# Patient Record
Sex: FEMALE | Race: WHITE | NOT HISPANIC OR LATINO | Employment: UNEMPLOYED | URBAN - METROPOLITAN AREA
[De-identification: names, ages, dates, MRNs, and addresses within clinical notes are randomized per-mention and may not be internally consistent; named-entity substitution may affect disease eponyms.]

---

## 2017-01-10 ENCOUNTER — ALLSCRIPTS OFFICE VISIT (OUTPATIENT)
Dept: OTHER | Facility: OTHER | Age: 22
End: 2017-01-10

## 2017-10-09 ENCOUNTER — OFFICE VISIT (OUTPATIENT)
Dept: URGENT CARE | Facility: MEDICAL CENTER | Age: 22
End: 2017-10-09
Payer: COMMERCIAL

## 2017-10-09 PROCEDURE — 99283 EMERGENCY DEPT VISIT LOW MDM: CPT

## 2017-10-09 PROCEDURE — G0382 LEV 3 HOSP TYPE B ED VISIT: HCPCS

## 2017-10-10 NOTE — PROGRESS NOTES
Assessment  1  Acute URI (465 9) (J06 9)    Plan  Acute URI    · Benzonatate 100 MG Oral Capsule; TAKE 1 CAPSULE 3 TIMES DAILY AS NEEDED    Discussion/Summary  Discussion Summary:   I prescribed Tessalon Perles 100 milligram q 8 hours  I encouraged patient to increase fluid intake  Also recommended over-the-counter Tylenol as needed for sore throat  If symptoms persist or worsen in next 2-3 days, patient should follow-up per primary care provider  Medication Side Effects Reviewed: Possible side effects of new medications were reviewed with the patient/guardian today  Counseling Documentation With Imm: The patient was counseled regarding  Chief Complaint  1  Cough  Chief Complaint Free Text Note Form: Patient stating of a dry cough and a itchy throat for 2 weeks  Denies fever  Has not tried any over the counter medicines  History of Present Illness  HPI: 23year old female presents to [facility] with throat pain and cough for 2 weeks  Pt  describes the throat pain as an itching sensation that is aggrevated by dry air  Pt  throat pain is worsened by swallowing  She has used cough drops with minor relief  Pt  describes her cough as dry  Pt  has been eating and drinking well  Pt  denies any fever, chills, SOB, and post-nasal drip  Hospital Based Practices Required Assessment:   Pain Assessment   the patient states they do not have pain  Abuse And Domestic Violence Screen   Domestic violence screen not done today  Reason DV Screen not done: not alone    Depression And Suicide Screen  Suicide screen not done today  -Reason suicide screen not done: not alone  Prefered Language is  [place]  Primary Language is  English  Review of Systems  Focused-Female:   Constitutional: no fever-and-no chills  ENT: sore throat  Cardiovascular: no complaints of slow or fast heart rate, no chest pain, no palpitations, no leg claudication or lower extremity edema  Respiratory: cough  Gastrointestinal: no complaints of abdominal pain, no constipation, no nausea or diarrhea, no vomiting, no bloody stools  Active Problems  1  Bilateral impacted cerumen (380 4) (H61 23)   2  Encounter for hearing examination (V72 19) (Z01 10)   3  Encounter for vision screening (V72 0) (Z01 00)   4  Morbid or severe obesity due to excess calories (278 01) (E66 01)   5  Need for influenza vaccination (V04 81) (Z23)   6  Right otitis externa (380 10) (H60 91)    Past Medical History  Active Problems And Past Medical History Reviewed: The active problems and past medical history were reviewed and updated today  Family History  Mother    1  Family history of hypertension (V17 49) (Z82 49)  Grandparent    2  Family history of diabetes mellitus (V18 0) (Z83 3)   3  Family history of hypertension (V17 49) (Z82 49)  Grandmother    4  Family history of malignant neoplasm (V16 9) (Z80 9)    Social History   · Denies alcohol consumption (V49 89) (Z78 9)   · Never A Smoker   · No drug use   · No preference on Roman Catholic beliefs   · Single    Current Meds   1  No Reported Medications Recorded  Medication List Reviewed: The medication list was reviewed and updated today  Allergies  1  No Known Drug Allergies    Vitals  Signs   Recorded: 02MXY8020 02:40PM   Temperature: 97 9 F, Tympanic  Heart Rate: 79  Respiration: 16  Systolic: 944  Diastolic: 72  Height: 5 ft 5 in  Weight: 237 lb   BMI Calculated: 39 44  BSA Calculated: 2 13  O2 Saturation: 98  Pain Scale: 0    Physical Exam    Constitutional   General appearance: No acute distress, well appearing and well nourished  Ears, Nose, Mouth, and Throat   External inspection of ears and nose: Normal     Otoscopic examination: Tympanic membranes translucent with normal light reflex  Canals patent without erythema  Nasal mucosa, septum, and turbinates: Normal without edema or erythema  Oropharynx: Abnormal  -Erythema of uvula     Pulmonary   Respiratory effort: No increased work of breathing or signs of respiratory distress  Cardiovascular   Auscultation of heart: Normal rate and rhythm, normal S1 and S2, without murmurs  Abdomen   Abdomen: Non-tender, no masses  Lymphatic   Palpation of lymph nodes in neck: No lymphadenopathy         Signatures   Electronically signed by : EPI Chi ; Oct  9 2017  3:17PM EST                       (Author)

## 2017-11-21 ENCOUNTER — GENERIC CONVERSION - ENCOUNTER (OUTPATIENT)
Dept: OTHER | Facility: OTHER | Age: 22
End: 2017-11-21

## 2017-11-23 ENCOUNTER — APPOINTMENT (EMERGENCY)
Dept: RADIOLOGY | Facility: HOSPITAL | Age: 22
End: 2017-11-23
Payer: COMMERCIAL

## 2017-11-23 ENCOUNTER — HOSPITAL ENCOUNTER (EMERGENCY)
Facility: HOSPITAL | Age: 22
Discharge: HOME | End: 2017-11-23
Attending: EMERGENCY MEDICINE
Payer: COMMERCIAL

## 2017-11-23 VITALS
DIASTOLIC BLOOD PRESSURE: 84 MMHG | OXYGEN SATURATION: 98 % | HEART RATE: 106 BPM | SYSTOLIC BLOOD PRESSURE: 141 MMHG | RESPIRATION RATE: 20 BRPM | TEMPERATURE: 99.3 F | WEIGHT: 235 LBS

## 2017-11-23 DIAGNOSIS — J18.9 PNEUMONIA: Primary | ICD-10-CM

## 2017-11-23 PROCEDURE — 99283 EMERGENCY DEPT VISIT LOW MDM: CPT

## 2017-11-23 PROCEDURE — 71020 HB CHEST X-RAY 2VW FRONTAL&LATL: CPT

## 2017-11-23 RX ORDER — AZITHROMYCIN 250 MG/1
TABLET, FILM COATED ORAL
Qty: 6 TABLET | Refills: 0 | Status: SHIPPED | OUTPATIENT
Start: 2017-11-23 | End: 2018-06-04 | Stop reason: HOSPADM

## 2017-11-23 RX ORDER — AZITHROMYCIN 250 MG/1
500 TABLET, FILM COATED ORAL ONCE
Status: COMPLETED | OUTPATIENT
Start: 2017-11-23 | End: 2017-11-23

## 2017-11-23 RX ADMIN — AZITHROMYCIN 500 MG: 250 TABLET, FILM COATED ORAL at 22:19

## 2017-11-24 NOTE — ED PROVIDER NOTES
History  Chief Complaint   Patient presents with    Cough     cough and sore throat for two weeks  "getting worse " denies tylenol or motrin  fever at home  29-year-old healthy female who presents today complaining of cough x1 week  Cough is dry  Patient also states that she has sore throat due to coughing  Had subjective a few days ago but did not check her temperature  No complaints of rhinorrhea, nasal congestion, ear pain, chest pain, shortness of breath, abdominal pain, vomiting, diarrhea, rashes  No sick contacts or recent travel  She is not a smoker  No history of asthma  She has been taking DayQuil with out relief of symptoms  No other attempted alleviating factors  None       History reviewed  No pertinent past medical history  History reviewed  No pertinent surgical history  History reviewed  No pertinent family history  I have reviewed and agree with the history as documented  Social History   Substance Use Topics    Smoking status: Never Smoker    Smokeless tobacco: Never Used    Alcohol use No        Review of Systems   Constitutional: Positive for fever (subjective)  Negative for activity change, appetite change and chills  HENT: Positive for sore throat  Negative for congestion, ear discharge, ear pain, facial swelling, rhinorrhea and trouble swallowing  Respiratory: Positive for cough  Negative for choking, chest tightness, shortness of breath and wheezing  Cardiovascular: Negative for chest pain and palpitations  Gastrointestinal: Negative for abdominal pain, diarrhea, nausea and vomiting  Genitourinary: Negative for dysuria, frequency, hematuria and urgency  Musculoskeletal: Negative for back pain  Skin: Negative for rash  Neurological: Negative for dizziness, weakness, light-headedness, numbness and headaches         Physical Exam  ED Triage Vitals [11/23/17 2120]   Temperature Pulse Respirations Blood Pressure SpO2   99 3 °F (37 4 °C) (!) 120 20 141/84 98 %      Temp src Heart Rate Source Patient Position - Orthostatic VS BP Location FiO2 (%)   -- Monitor -- Right arm --      Pain Score       3           Orthostatic Vital Signs  Vitals:    11/23/17 2120 11/23/17 2225   BP: 141/84    Pulse: (!) 120 (!) 106       Physical Exam   Constitutional: She is oriented to person, place, and time  She appears well-developed and well-nourished  Non-toxic appearance  She does not have a sickly appearance  She does not appear ill  No distress  Well appearing female, loud, coarse cough noted  HENT:   Head: Normocephalic and atraumatic  Right Ear: Hearing, tympanic membrane, external ear and ear canal normal    Left Ear: Hearing, tympanic membrane, external ear and ear canal normal    Nose: Nose normal  No mucosal edema or rhinorrhea  Mouth/Throat: Uvula is midline and mucous membranes are normal  Posterior oropharyngeal erythema present  No oropharyngeal exudate, posterior oropharyngeal edema or tonsillar abscesses  No tonsillar exudate  Eyes: Conjunctivae, EOM and lids are normal  Pupils are equal, round, and reactive to light  Neck: Normal range of motion  Neck supple  Normal range of motion present  Cardiovascular: Normal rate, regular rhythm and normal heart sounds  Exam reveals no gallop and no friction rub  No murmur heard  Pulmonary/Chest: Effort normal  No accessory muscle usage  No tachypnea  No respiratory distress  She has no decreased breath sounds  She has no wheezes  She has rhonchi (LLL)  She has no rales  Musculoskeletal: Normal range of motion  Neurological: She is alert and oriented to person, place, and time  Skin: Skin is warm and dry  Capillary refill takes less than 2 seconds  She is not diaphoretic  Psychiatric: She has a normal mood and affect  Nursing note and vitals reviewed        ED Medications  Medications   azithromycin (ZITHROMAX) tablet 500 mg (500 mg Oral Given 11/23/17 2219)       Diagnostic Studies  Results Reviewed     None                 XR chest 2 views   ED Interpretation by Jn Waters PA-C (11/23 2213)   Left lower lobe infiltrate                 Procedures  Procedures       Phone Contacts  ED Phone Contact    ED Course  ED Course                                MDM  Number of Diagnoses or Management Options  Pneumonia: new and requires workup  Diagnosis management comments:   22 yo F presents with 1 week of coughing, sore throat and subjective fevers  CXR with questionable early infiltrate in LLL  Will tx with azithromycin, supportive care: rest, fluids  F/u with PCP, RTED for worsening  Patient verbalizes understanding and agrees with plan  Amount and/or Complexity of Data Reviewed  Tests in the radiology section of CPT®: ordered and reviewed  Independent visualization of images, tracings, or specimens: yes    Patient Progress  Patient progress: stable    CritCare Time    Disposition  Final diagnoses:   Pneumonia     Time reflects when diagnosis was documented in both MDM as applicable and the Disposition within this note     Time User Action Codes Description Comment    11/23/2017 10:16 PM Wilda Delgado Add [J18 9] Pneumonia       ED Disposition     ED Disposition Condition Comment    Discharge  [address] [place] [address] discharge to home/self care      Condition at discharge: Good        Follow-up Information     Follow up With Specialties Details Why Contact Info Additional Information    Sheila Davey MD Family Medicine Schedule an appointment as soon as possible for a visit  [address] [place] [address] Drive       [address] [place] [address] Emergency Department Emergency Medicine  If symptoms worsen - [facility] [facility]  [phone] [place] ED, [address] [place] [address], [facility]        Discharge Medication List as of 11/23/2017 10:16 PM      START taking these medications    Details   azithromycin (ZITHROMAX) 250 mg tablet Take 2 tablets today then 1 tablet daily x 4 days, Print           No discharge procedures on file      ED Provider  Electronically Signed by           Alcira Dwyer PA-C  11/23/17 4685

## 2017-11-24 NOTE — DISCHARGE INSTRUCTIONS
Community Acquired Pneumonia   WHAT YOU NEED TO KNOW:   Community-acquired pneumonia (CAP) is a lung infection that you get outside of a hospital or nursing home setting  Your lungs become inflamed and cannot work well  CAP may be caused by bacteria, viruses, or fungi  DISCHARGE INSTRUCTIONS:   Return to the emergency department if:   · You are confused and cannot think clearly  · You have increased trouble breathing  · Your lips or fingernails turn gray or blue  Contact your healthcare provider if:   · Your symptoms do not get better, or they get worse  · You are urinating less, or not at all  · You have questions or concerns about your condition or care  Medicines:   · Medicines  may be given to treat a bacterial, viral, or fungal infection  You may also be given medicines to dilate your bronchial tubes to help you breathe more easily  · Take your medicine as directed  Contact your healthcare provider if you think your medicine is not helping or if you have side effects  Tell him or her if you are allergic to any medicine  Keep a list of the medicines, vitamins, and herbs you take  Include the amounts, and when and why you take them  Bring the list or the pill bottles to follow-up visits  Carry your medicine list with you in case of an emergency  Follow up with your healthcare provider within 3 days or as directed: You may need another x-ray  Write down your questions so you remember to ask them during your visits  Deep breathing and coughing:  Deep breathing helps open the air passages in your lungs  Coughing helps bring up mucus from your lungs  Take a deep breath and hold the breath as long as you can  Then push the air out of your lungs with a deep, strong cough  Spit out any mucus you have coughed up  Take 10 deep breaths in a row every hour that you are awake  Remember to follow each deep breath with a cough     Do not smoke or allow others to smoke around you:  Nicotine and other chemicals in cigarettes and cigars can cause lung damage  Ask your healthcare provider for information if you currently smoke and need help to quit  E-cigarettes or smokeless tobacco still contain nicotine  Talk to your healthcare provider before you use these products  Manage CAP at home:   · Breathe warm, moist air  This helps loosen mucus  Loosely place a warm, wet washcloth over your nose and mouth  A room humidifier may also help make the air moist     · Drink liquids as directed  Ask your healthcare provider how much liquid to drink each day and which liquids to drink  Liquids help make mucus thin and easier to get out of your body  · Gently tap your chest   This helps loosen mucus so it is easier to cough  Lie with your head lower than your chest several times a day and tap your chest      · Get plenty of rest   Rest helps your body heal   Prevent CAP:   · Wash your hands often with soap and water  Carry germ-killing hand gel with you  You can use the gel to clean your hands when soap and water are not available  Do not touch your eyes, nose, or mouth unless you have washed your hands first      · Clean surfaces often  Clean doorknobs, countertops, cell phones, and other surfaces that are touched often  · Always cover your mouth when you cough  Cough into a tissue or your shirtsleeve so you do not spread germs from your hands  · Try to avoid people who have a cold or the flu  If you are sick, stay away from others as much as possible  · Ask about vaccines  You may need a vaccine to help prevent pneumonia  Get an influenza (flu) vaccine every year as soon as it becomes available  © 2017 [employer] Information is for End User's use only and may not be sold, redistributed or otherwise used for commercial purposes  All illustrations and images included in CareNotes® are the copyrighted property of [employer]  or Carlos Kowalski    The above information is an  only  It is not intended as medical advice for individual conditions or treatments  Talk to your doctor, nurse or pharmacist before following any medical regimen to see if it is safe and effective for you

## 2017-12-05 ENCOUNTER — GENERIC CONVERSION - ENCOUNTER (OUTPATIENT)
Dept: OTHER | Facility: OTHER | Age: 22
End: 2017-12-05

## 2017-12-05 DIAGNOSIS — J18.1 LOBAR PNEUMONIA (HCC): ICD-10-CM

## 2018-01-13 NOTE — PROGRESS NOTES
Assessment    1  Encounter for preventive health examination (V70 0) (Z00 00)   2  Morbid or severe obesity due to excess calories (278 01) (E66 01)   3  Bilateral impacted cerumen (380 4) (H61 23)   4  Need for influenza vaccination (V04 81) (Z23)   5  Encounter for vision screening (V72 0) (Z01 00)   6  Encounter for hearing examination (V72 19) (Z01 10)    Plan  Bilateral impacted cerumen    · Debrox 6 5 % Otic Solution; Instill 5-10 drops twice daily for up to 4 days  starting in  1 week  Health Maintenance    · Use a sun block product with an SPF of 15 or more ; Status:Complete;   Done:  [id number]  Morbid or severe obesity due to excess calories    · (1) CBC/PLT/DIFF; Status:Active; Requested VDT:[id number];    · (1) COMPREHENSIVE METABOLIC PANEL; Status:Active; Requested WSI:[id number];    · (1) LIPID PANEL, FASTING; Status:Active; Requested RUBEN:[id number];    · Begin or continue regular aerobic exercise  Gradually work up to at least count1  sessions of dur1 of exercise a week ; Status:Complete;   Done: 05COD1780   · We recommend that you bring your body mass index down to 26 ; Status:Complete;    Done: 03Ttq1412  Need for influenza vaccination    · Fluarix Intramuscular Suspension    Discussion/Summary  health maintenance visit cervical cancer screening is not indicated Breast cancer screening: breast cancer screening is not indicated  Colorectal cancer screening: colorectal cancer screening is not indicated  Screening lab work includes hemoglobin, glucose and lipid profile  The immunizations will be given as outlined in the orders  She was advised to be evaluated by a dentist  Advice and education were given regarding nutrition and aerobic exercise  Patient discussion: discussed with the patient  Use debrox and follow up here in 2 weeks for ear irrigation  I discussed with her regular exercise is needed  Possible side effects of new medications were reviewed with the patient/guardian today     The patient was counseled regarding instructions for management, risk factor reductions, impressions  Self Referrals: No      Chief Complaint  Pt  is here for physical exam  Pt  states she was cleaning her ear on Saturday with an ear swab  Pt  states she made her self bleed  Pt  denies any other health concerns  History of Present Illness  HM, Adult Female: The patient is being seen for a health maintenance evaluation  The last health maintenance visit was 3 year(s) ago  General Health: The patient's health since the last visit is described as good  She does not have regular dental visits  She denies vision problems  She denies hearing loss  Immunizations status: up to date  Lifestyle:  She does not have a healthy diet  She does not exercise regularly  She does not use tobacco  She denies alcohol use  She denies drug use  Reproductive health:  she reports abnormal menses  she uses no contraception  Screening: cancer screening reviewed and current  metabolic screening reviewed and current  risk screening reviewed and current  HPI: 24 y/o F here for NP visit  She wants to get her drivers permit  She notes she was using qtip recently and had blood in left ear  No pain and no hearing difficulty      Review of Systems    Constitutional: No fever, no chills, feels well, no tiredness, no recent weight gain or weight loss  Eyes: No complaints of eye pain, no red eyes, no eyesight problems, no discharge, no dry eyes, no itching of eyes  ENT: no complaints of earache, no loss of hearing, no nose bleeds, no nasal discharge, no sore throat, no hoarseness  Cardiovascular: No complaints of slow heart rate, no fast heart rate, no chest pain, no palpitations, no leg claudication, no lower extremity edema  Respiratory: No complaints of shortness of breath, no wheezing, no cough, no SOB on exertion, no orthopnea, no PND     Gastrointestinal: No complaints of abdominal pain, no constipation, no nausea or vomiting, no diarrhea, no bloody stools  Genitourinary: No complaints of dysuria, no incontinence, no pelvic pain, no dysmenorrhea, no vaginal discharge or bleeding  Musculoskeletal: No complaints of arthralgias, no myalgias, no joint swelling or stiffness, no limb pain or swelling  Integumentary: No complaints of skin rash or lesions, no itching, no skin wounds, no breast pain or lump  Neurological: No complaints of headache, no confusion, no convulsions, no numbness, no dizziness or fainting, no tingling, no limb weakness, no difficulty walking  Psychiatric: Not suicidal, no sleep disturbance, no anxiety or depression, no change in personality, no emotional problems  Endocrine: No complaints of proptosis, no hot flashes, no muscle weakness, no deepening of the voice, no feelings of weakness  Hematologic/Lymphatic: No complaints of swollen glands, no swollen glands in the neck, does not bleed easily, does not bruise easily  Active Problems    1  Morbid or severe obesity due to excess calories (278 01) (E66 01)    Family History  Mother    · Family history of hypertension (V17 49) (Z82 49)  Grandparent    · Family history of diabetes mellitus (V18 0) (Z83 3)   · Family history of hypertension (V17 49) (Z82 49)  Grandmother    · Family history of malignant neoplasm (V16 9) (Z80 9)    Social History    · Denies alcohol consumption (V49 89) (Z78 9)   · Never A Smoker   · No drug use   · No preference on Zoroastrian beliefs   · Single    Current Meds   1  No Reported Medications Recorded    Allergies    1   No Known Drug Allergies    Vitals   Recorded: 10NZZ7747 33:12ZN   Systolic 753   Diastolic 88   Heart Rate 93   Respiration 19   Temperature 98 4 F   O2 Saturation 99   Height 5 ft 5 in   Weight 225 lb    BMI Calculated 37 44   BSA Calculated 2 08   BMI Percentile 99 %   2-20 Stature Percentile 61 %   2-20 Weight Percentile 99 %     Physical Exam    Constitutional   General appearance: No acute distress, well appearing and well nourished  Eyes   Conjunctiva and lids: No swelling, erythema or discharge  Pupils and irises: Equal, round and reactive to light  Ears, Nose, Mouth, and Throat   External inspection of ears and nose: Normal     Otoscopic examination: Abnormal   The right external canal had a cerumen impaction  The left external canal had a cerumen impaction  Oropharynx: Normal with no erythema, edema, exudate or lesions  Pulmonary   Respiratory effort: No increased work of breathing or signs of respiratory distress  Auscultation of lungs: Clear to auscultation  Cardiovascular   Palpation of heart: Normal PMI, no thrills  Auscultation of heart: Normal rate and rhythm, normal S1 and S2, without murmurs  Examination of extremities for edema and/or varicosities: Normal     Abdomen   Abdomen: Non-tender, no masses  Liver and spleen: No hepatomegaly or splenomegaly  Lymphatic   Palpation of lymph nodes in neck: No lymphadenopathy  Musculoskeletal   Gait and station: Normal     Digits and nails: Normal without clubbing or cyanosis  Inspection/palpation of joints, bones, and muscles: Normal     Skin   Skin and subcutaneous tissue: Normal without rashes or lesions  Neurologic   Cranial nerves: Cranial nerves 2-12 intact  Reflexes: 2+ and symmetric  Sensation: No sensory loss  Psychiatric   Orientation to person, place, and time: Normal     Mood and affect: Normal        Results/Data  PHQ-2 Adult Depression Screening 25Oct2016 10:30AM User, s     Test Name Result Flag Reference   PHQ-2 Adult Depression Score 0     Over the last two weeks, how often have you been bothered by any of the following problems? Little interest or pleasure in doing things: Not at all - 0  Feeling down, depressed, or hopeless: Not at all - 0   PHQ-2 Adult Depression Screening Negative         Procedure    Procedure: Hearing Acuity Test    Indication: Routine screeing     Audiometry: Hearing in the right ear: 20 decibals at 500 hertz, 20 decibals at 1000 hertz, 20 decibals at 2000 hertz, 20 decibals at 4000 hertz and 20 decibals at 6000 hertz  Hearing in the left ear: 20 decibals at 500 hertz, 20 decibals at 1000 hertz, 20 decibals at 2000 hertz, 20 decibals at 4000 hertz and 20 decibals at 6000 hertz  Procedure: Visual Acuity Test    Indication: routine screening  Inforrmation supplied by if a Snellen chart  Results: 20/15 in both eyes with corrective device, 20/15 in the right eye with corrective device, 20/15 in the left eye with corrective device   Color vision was reported by IF, screened with the [place] Test and the results were normal    The patient was cooperative, but tolerated the procedure well        Signatures   Electronically signed by : SHAWN Henson; Oct 26 2016  6:50AM EST                       (Author)    Electronically signed by : EPI Elizondo ; Oct 26 2016  1:41PM EST

## 2018-01-14 VITALS
WEIGHT: 228.38 LBS | RESPIRATION RATE: 16 BRPM | OXYGEN SATURATION: 97 % | TEMPERATURE: 97.9 F | HEART RATE: 139 BPM | SYSTOLIC BLOOD PRESSURE: 118 MMHG | DIASTOLIC BLOOD PRESSURE: 80 MMHG | BODY MASS INDEX: 38.05 KG/M2 | HEIGHT: 65 IN

## 2018-01-22 VITALS
SYSTOLIC BLOOD PRESSURE: 128 MMHG | WEIGHT: 237 LBS | HEIGHT: 65 IN | DIASTOLIC BLOOD PRESSURE: 84 MMHG | BODY MASS INDEX: 39.49 KG/M2

## 2018-01-24 VITALS
WEIGHT: 236.13 LBS | HEIGHT: 65 IN | DIASTOLIC BLOOD PRESSURE: 72 MMHG | SYSTOLIC BLOOD PRESSURE: 122 MMHG | HEART RATE: 80 BPM | OXYGEN SATURATION: 97 % | TEMPERATURE: 97.6 F | RESPIRATION RATE: 18 BRPM | BODY MASS INDEX: 39.34 KG/M2

## 2018-06-04 ENCOUNTER — OFFICE VISIT (OUTPATIENT)
Dept: URGENT CARE | Facility: MEDICAL CENTER | Age: 23
End: 2018-06-04
Payer: COMMERCIAL

## 2018-06-04 VITALS
RESPIRATION RATE: 18 BRPM | HEIGHT: 65 IN | HEART RATE: 82 BPM | TEMPERATURE: 98.4 F | WEIGHT: 243 LBS | OXYGEN SATURATION: 96 % | DIASTOLIC BLOOD PRESSURE: 75 MMHG | SYSTOLIC BLOOD PRESSURE: 121 MMHG | BODY MASS INDEX: 40.48 KG/M2

## 2018-06-04 DIAGNOSIS — H10.31 ACUTE CONJUNCTIVITIS OF RIGHT EYE, UNSPECIFIED ACUTE CONJUNCTIVITIS TYPE: Primary | ICD-10-CM

## 2018-06-04 PROCEDURE — G0382 LEV 3 HOSP TYPE B ED VISIT: HCPCS | Performed by: PHYSICIAN ASSISTANT

## 2018-06-04 PROCEDURE — 99283 EMERGENCY DEPT VISIT LOW MDM: CPT | Performed by: PHYSICIAN ASSISTANT

## 2018-06-04 RX ORDER — TOBRAMYCIN 3 MG/ML
1 SOLUTION/ DROPS OPHTHALMIC
Qty: 5 ML | Refills: 0 | Status: SHIPPED | OUTPATIENT
Start: 2018-06-04 | End: 2018-06-09

## 2018-06-04 NOTE — PROGRESS NOTES
[facility]        NAME: Darin Williamson is a 23 y o  female  : 1995    MRN: [id number]  DATE: 2018  TIME: 1:15 PM    Assessment and Plan   Acute conjunctivitis of right eye, unspecified acute conjunctivitis type [H10 31]  1  Acute conjunctivitis of right eye, unspecified acute conjunctivitis type  tobramycin (TOBREX) 0 3 % SOLN         Patient Instructions     Complete course of prescribed eye drops to affected eye  Maintain good hand hygiene and avoid swimming pools  Apply warm compresses to affected eye  Avoid sharing napkins, towels, pillow cases, and linens  Avoid contact use during the infection  Follow up with PCP if symptoms persist and/or worsen  Go to opthalmology if any additional symptoms develop or if you are in any acute visual distress (i e blurred vision, loss of vision)  Chief Complaint     Chief Complaint   Patient presents with    Eye Pain     Patient relates started with right eye discharge since yesterday  + pain and itching  History of Present Illness   Darin Williamson presents to the clinic c/o      53-year-old female presents for evaluation of right-sided eye discharge, redness for the last 3 days  She states on Friday her right eye felt itchy, and yesterday started get puffy with discharge  She denies any foreign body sensation  she denies any pain with extraocular movements  She denies any visual deficits in her right eye  She does not work contact lenses  She denies any painful eye symptoms  Review of Systems   Review of Systems   Eyes: Positive for discharge and redness  Respiratory: Negative  Current Medications     No long-term prescriptions on file         Current Allergies     Allergies as of 2018    (No Known Allergies)            The following portions of the patient's history were reviewed and updated as appropriate: allergies, current medications, past family history, past medical history, past social history, past surgical history and problem list     Objective   /75   Pulse 82   Temp 98 4 °F (36 9 °C) (Tympanic)   Resp 18   Ht 5' 5" (1 651 m)   Wt 110 kg (243 lb)   SpO2 96%   BMI 40 44 kg/m²        Physical Exam     Physical Exam   Constitutional: She appears well-developed and well-nourished  No distress  Eyes: EOM are normal  Pupils are equal, round, and reactive to light  Right eye exhibits exudate  Right conjunctiva is injected  Visible exudate in the medial epicanthal fold, as well as at the base of the lower right eyelid on the palpebral conjunctiva  No pain with extraocular movements  No visible proptosis  Cardiovascular: Normal rate, regular rhythm and normal heart sounds  Exam reveals no gallop and no friction rub  No murmur heard  Pulmonary/Chest: Effort normal and breath sounds normal  No respiratory distress  She has no wheezes  She has no rales  Skin: She is not diaphoretic  Nursing note and vitals reviewed

## 2018-06-04 NOTE — PATIENT INSTRUCTIONS
Conjunctivitis   AMBULATORY CARE:   Conjunctivitis,  or pink eye, is inflammation of your conjunctiva  The conjunctiva is a thin tissue that covers the front of your eye and the back of your eyelids  The conjunctiva helps protect your eye and keep it moist  Conjunctivitis may be caused by bacteria, allergies, or a virus  If your conjunctivitis is caused by bacteria, it may get better on its own in about 7 days  Viral conjunctivitis can last up to 3 weeks  Common symptoms may include any of the following: You will usually have symptoms in both eyes if your conjunctivitis is caused by allergies  You may also have other allergic symptoms, such as a rash or runny nose  Symptoms will usually start in 1 eye if your conjunctivitis is caused by a virus or bacteria  · Redness in the whites of your eye    · Itching in your eye or around your eye    · Feeling like there is something in your eye    · Watery or thick, sticky discharge    · Crusty eyelids when you wake up in the morning    · Burning, stinging, or swelling in your eye    · Pain when you see bright light  Seek care immediately if:   · You have worsening eye pain  · The swelling in your eye gets worse, even after treatment  · Your vision suddenly becomes worse or you cannot see at all  Contact your healthcare provider if:   · You develop a fever and ear pain  · You have tiny bumps or spots of blood on your eye  · You have questions or concerns about your condition or care  Treatment  will depend on the cause of your conjunctivitis  You may need antibiotics or allergy medicine as a pill, eye drop, or eye ointment  Manage your symptoms:   · Apply a cool compress  Wet a washcloth with cold water and place it on your eye  This will help decrease itching and irritation  · Do not wear contact lenses  They can irritate your eye  Throw away the pair you are using and ask when you can wear them again   Use a new pair of lenses when your healthcare provider says it is okay  · Avoid irritants  Stay away from smoke filled areas  Shield your eyes from wind and sun  · Flush your eye  You may need to flush your eye with saline to help decrease your symptoms  Ask for more information on how to flush your eye  Medicines:  Treatment depends on what is causing your conjunctivitis  You may be given any of the following:  · Allergy medicine  helps decrease itchy, red, swollen eyes caused by allergies  It may be given as a pill, eye drops, or nasal spray  · Antibiotics  may be needed if your conjunctivitis is caused by bacteria  This medicine may be given as a pill, eye drops, or eye ointment  · Take your medicine as directed  Contact your healthcare provider if you think your medicine is not helping or if you have side effects  Tell him or her if you are allergic to any medicine  Keep a list of the medicines, vitamins, and herbs you take  Include the amounts, and when and why you take them  Bring the list or the pill bottles to follow-up visits  Carry your medicine list with you in case of an emergency  Prevent the spread of conjunctivitis:   · Wash your hands with soap and water often  Wash your hands before and after you touch your eyes  Also wash your hands before you prepare or eat food and after you use the bathroom or change a diaper  · Avoid allergens  Try to avoid the things that cause your allergies, such as pets, dust, or grass  · Avoid contact with others  Do not share towels or washcloths  Try to stay away from others as much as possible  Ask when you can return to work or school  · Throw away eye makeup  The bacteria that caused your conjunctivitis can stay in eye makeup  Throw away mascara and other eye makeup  © 2017 [employer]  Information is for End User's use only and may not be sold, redistributed or otherwise used for commercial purposes   All illustrations and images included in [facility] are the copyrighted property of [employer]  or Carlos Kowalski  The above information is an  only  It is not intended as medical advice for individual conditions or treatments  Talk to your doctor, nurse or pharmacist before following any medical regimen to see if it is safe and effective for you

## 2018-12-20 ENCOUNTER — OFFICE VISIT (OUTPATIENT)
Dept: FAMILY MEDICINE CLINIC | Facility: CLINIC | Age: 23
End: 2018-12-20
Payer: COMMERCIAL

## 2018-12-20 VITALS
RESPIRATION RATE: 18 BRPM | BODY MASS INDEX: 40.54 KG/M2 | SYSTOLIC BLOOD PRESSURE: 108 MMHG | DIASTOLIC BLOOD PRESSURE: 70 MMHG | OXYGEN SATURATION: 96 % | HEIGHT: 65 IN | HEART RATE: 74 BPM | WEIGHT: 243.31 LBS | TEMPERATURE: 95.8 F

## 2018-12-20 DIAGNOSIS — N63.0 BREAST LUMP IN UPPER INNER QUADRANT: Primary | ICD-10-CM

## 2018-12-20 DIAGNOSIS — Z23 NEED FOR INFLUENZA VACCINATION: ICD-10-CM

## 2018-12-20 PROCEDURE — 1036F TOBACCO NON-USER: CPT | Performed by: PHYSICIAN ASSISTANT

## 2018-12-20 PROCEDURE — 99213 OFFICE O/P EST LOW 20 MIN: CPT | Performed by: PHYSICIAN ASSISTANT

## 2018-12-20 PROCEDURE — 90686 IIV4 VACC NO PRSV 0.5 ML IM: CPT

## 2018-12-20 PROCEDURE — 3008F BODY MASS INDEX DOCD: CPT | Performed by: PHYSICIAN ASSISTANT

## 2018-12-20 PROCEDURE — 90471 IMMUNIZATION ADMIN: CPT

## 2018-12-20 NOTE — PROGRESS NOTES
Assessment/Plan:    No problem-specific Assessment & Plan notes found for this encounter  Problem List Items Addressed This Visit     None      Visit Diagnoses     Breast lump in upper inner quadrant    -  Primary  Saeley hormonal breast changes  Recommend to monitor breasts and return in lesion is constant or grows or unable to be moved easily    Need for influenza vaccination        Relevant Orders    SYRINGE/SINGLE-DOSE VIAL: influenza vaccine, [id number], quadrivalent, 0 5 mL, preservative-free (AFLURIA, FLUARIX, FLULAVAL, FLUZONE) (Completed)            Subjective:      Patient ID: Ross Marie is a 26 y o  female  HPI   25 y/o F here for left breast lump that she felt about 3 weeks ago  It is not painful  It comes and goes at times  She first noted it after her period  NO FH of breast cancer  No skin rashes  The following portions of the patient's history were reviewed and updated as appropriate:   She  has no past medical history on file  She There are no active problems to display for this patient  She  has no past surgical history on file  Her family history includes Cancer in her family; Diabetes in her family; Hypertension in her family and mother  She  reports that she has never smoked  She has never used smokeless tobacco  She reports that she does not drink alcohol or use drugs  No current outpatient prescriptions on file  No current facility-administered medications for this visit  No current outpatient prescriptions on file prior to visit  No current facility-administered medications on file prior to visit  She has No Known Allergies       Review of Systems   Constitutional: Negative for chills  Respiratory: Negative for cough  Cardiovascular: Negative for chest pain  Skin: Negative for rash  Psychiatric/Behavioral: Negative for dysphoric mood           Objective:      /70 (BP Location: Left arm, Patient Position: Sitting, Cuff Size: Large) Pulse 74   Temp (!) 95 8 °F (35 4 °C) (Tympanic)   Resp 18   Ht 5' 5" (1 651 m)   Wt 110 kg (243 lb 5 oz)   LMP 11/06/2018 (Exact Date)   SpO2 96%   BMI 40 49 kg/m²          Physical Exam   Constitutional: She is oriented to person, place, and time  She appears well-developed and well-nourished  Genitourinary:   Genitourinary Comments: Left breast-no mass palpable  Patient unable to find either  No adenopathy   Neurological: She is alert and oriented to person, place, and time  Skin: Skin is warm and dry  No rash noted  Psychiatric: She has a normal mood and affect  Her behavior is normal    Nursing note and vitals reviewed

## 2019-03-27 ENCOUNTER — OFFICE VISIT (OUTPATIENT)
Dept: OBGYN CLINIC | Facility: CLINIC | Age: 24
End: 2019-03-27

## 2019-03-27 VITALS
SYSTOLIC BLOOD PRESSURE: 134 MMHG | DIASTOLIC BLOOD PRESSURE: 83 MMHG | HEART RATE: 112 BPM | WEIGHT: 230 LBS | BODY MASS INDEX: 38.27 KG/M2

## 2019-03-27 DIAGNOSIS — Z30.41 ENCOUNTER FOR SURVEILLANCE OF CONTRACEPTIVE PILLS: ICD-10-CM

## 2019-03-27 DIAGNOSIS — Z30.09 ENCOUNTER FOR COUNSELING REGARDING CONTRACEPTION: Primary | ICD-10-CM

## 2019-03-27 PROCEDURE — 99213 OFFICE O/P EST LOW 20 MIN: CPT | Performed by: NURSE PRACTITIONER

## 2019-03-27 RX ORDER — NORGESTIMATE AND ETHINYL ESTRADIOL 0.25-0.035
1 KIT ORAL DAILY
Qty: 28 TABLET | Refills: 3 | Status: SHIPPED | OUTPATIENT
Start: 2019-03-27 | End: 2020-03-25

## 2019-03-27 NOTE — PROGRESS NOTES
Assessment/Plan:         Diagnoses and all orders for this visit:    Encounter for counseling regarding contraception    Encounter for surveillance of contraceptive pills  -     norgestimate-ethinyl estradiol (ORTHO-CYCLEN) 0 25-35 MG-MCG per tablet; Take 1 tablet by mouth daily        Plan  Restart birth control pills as directed  Birth Control Pills   WHAT YOU NEED TO KNOW:   Birth control pills are also called oral contraceptives, or the pill  It is medicine that helps prevent pregnancy  Birth control pills work by preventing ovulation  Ovulation is when the ovaries make and release an egg cell each month  If this egg gets fertilized by sperm, pregnancy occurs  Birth control pills may also help to prevent pregnancy by keeping sperm from fertilizing an egg  DISCHARGE INSTRUCTIONS:   Follow up with your healthcare provider as directed:  Write down your questions so you remember to ask them during your visits  Advantages of birth control pills:  When birth control pills are used correctly, the chances of getting pregnant are very low  Birth control pills may help decrease bleeding and pain during your monthly period  They may also help prevent cancer of the uterus and ovaries  Disadvantages of birth control pills: You may have sudden changes in your mood or feelings while you take birth control pills  You may have nausea and decreased sex drive  You may have an increased appetite and rapid weight gain  You may also have bleeding in between periods, less frequent periods, vaginal dryness, and breast pain  Birth control pills will not protect you from sexually transmitted infections  Rarely, some birth control pills can increase your risk for a blood clot  This may become life-threatening  If you want to get pregnant: If you are planning to have a baby, ask your healthcare provider when you may stop taking your birth control pills  It may take some time for you to start ovulating again   Ask your healthcare provider for more information about pregnancy after birth control pills  When to start taking birth control pills after you have a baby: If you are not breastfeeding, you may start taking birth control pills 3 weeks after you give birth  You may be able to take certain types of birth control pills if you are breastfeeding  These pills can be started from 6 weeks to 6 months after you give birth  Ask your healthcare provider for more information about when to start taking birth control pills after you give birth  Contact your healthcare provider if:   · You have forgotten to take a birth control pill  · You have mood changes, such as depression, since starting birth control pills  · You have nausea or you are vomiting  · You have severe abdominal pain  · You missed a period and have questions or concerns about being pregnant  · You still have bleeding 4 months after taking birth control pills correctly  · You have questions or concerns about your condition or care  Return to the emergency department if:   · Your arm or leg feels warm, tender, and painful  It may look swollen and red  · You feel lightheaded, short of breath, and have chest pain  · You cough up blood  · You have any of the following signs of a stroke:      ¨ Numbness or drooping on one side of your face     ¨ Weakness in an arm or leg    ¨ Confusion or difficulty speaking    ¨ Dizziness, a severe headache, or vision loss    · You have severe pain, numbness, or swelling in your arms or legs  © 2017 [employer] Information is for End User's use only and may not be sold, redistributed or otherwise used for commercial purposes  All illustrations and images included in CareNotes® are the copyrighted property of [employer]  or [employer] Kowalski  The above information is an  only  It is not intended as medical advice for individual conditions or treatments   Talk to your doctor, nurse or pharmacist before following any medical regimen to see if it is safe and effective for you  Missed or Late Pills: For combined (Estrogen and Progestin) pills:    If one hormonal pill is LATE (<24 hours since a pill should have been taken): Take the late or missed pill as soon as possible  Continue taking the remaining pills at the usual time (even if it means taking two pills on the same day)  No additional contraceptive protection is needed  Emergency contraception is not usually needed but can be considered if hormonal pills were missed earlier in the cycle or in the last week of the previous cycle  If one hormonal pill has been MISSED (24 to <48 hours since a pill should have been taken): Take the late or missed pill as soon as possible  Continue taking the remaining pills at the usual time (even if it means taking two pills on the same day)  No additional contraceptive protection is needed  Emergency contraception is not usually needed but can be considered if hormonal pills were missed earlier in the cycle or in the last week of the previous cycle  If two or more consecutive hormonal pills have been missed: (less than or equal to 48 hours since a pill should have been taken): Take the most recent missed pill as soon as possible  (Any other missed pills should be discarded ) Continue taking the remaining pills at the usual time (even if it means taking two pills on the same day)  Use back-up contraception (e g , condoms) or avoid sexual intercourse until hormonal pills have been taken for 7 consecutive days  If pills were missed in the last week of hormonal pills (e g , days 15-21 for 28-day pill packs): Omit the hormone-free interval by finishing the horomal pills in the current pack and starting a new pack the next day   If unable to start a new pack immediately, use back-up contraception (e g , condoms) or avoid sexual intercourse until hormonal pills from a new pack have been taken for 7 consecutive days  Emergency contraception should be considered if hormonal pills were missed during the first week and unprotected sexual intercourse occurred in the previous 5 days  Emergency contraception may also be considered at other times as appropriate  Source: [place]  Selected Practice Recommendations for Contraceptive Use, 2013  Call with needs or concerns  Return for Mirena insertion  Schedule annual GYN exam and PAP  Pt verbalized understanding of all discussed  Subjective:      Patient ID: Tj Hill is a 26 y o  female  HPI  Pt presents to restart OCP's, pt has been off OCP's for a short time  Pt has been using condoms for birth control  Safe and effective use of OCP's provided  Pt is considering a Mirena, states she wants a long term method, states 1 partner and they do not want a child for 5-10 years  Safe and effective use of Mirena provided, including irregular bleeding pattern  States she just finished LMP yesterday  The following portions of the patient's history were reviewed and updated as appropriate: allergies, current medications, past family history, past medical history, past social history, past surgical history and problem list     Review of Systems    Pertinent items are noted in the HPI  Objective:      /83 (BP Location: Left arm, Patient Position: Sitting, Cuff Size: Standard)   Pulse (!) 112   Wt 104 kg (230 lb)   LMP 03/20/2019 (Approximate)   Breastfeeding?  No   BMI 38 27 kg/m²          Physical Exam    Alert and oriented  Denies pain  Plans to schedule Mirena insertion  Plans to schedule annual GYN exam and PAP

## 2019-03-27 NOTE — PATIENT INSTRUCTIONS
Restart birth control pills as directed  Birth Control Pills   WHAT YOU NEED TO KNOW:   Birth control pills are also called oral contraceptives, or the pill  It is medicine that helps prevent pregnancy  Birth control pills work by preventing ovulation  Ovulation is when the ovaries make and release an egg cell each month  If this egg gets fertilized by sperm, pregnancy occurs  Birth control pills may also help to prevent pregnancy by keeping sperm from fertilizing an egg  DISCHARGE INSTRUCTIONS:   Follow up with your healthcare provider as directed:  Write down your questions so you remember to ask them during your visits  Advantages of birth control pills:  When birth control pills are used correctly, the chances of getting pregnant are very low  Birth control pills may help decrease bleeding and pain during your monthly period  They may also help prevent cancer of the uterus and ovaries  Disadvantages of birth control pills: You may have sudden changes in your mood or feelings while you take birth control pills  You may have nausea and decreased sex drive  You may have an increased appetite and rapid weight gain  You may also have bleeding in between periods, less frequent periods, vaginal dryness, and breast pain  Birth control pills will not protect you from sexually transmitted infections  Rarely, some birth control pills can increase your risk for a blood clot  This may become life-threatening  If you want to get pregnant: If you are planning to have a baby, ask your healthcare provider when you may stop taking your birth control pills  It may take some time for you to start ovulating again  Ask your healthcare provider for more information about pregnancy after birth control pills  When to start taking birth control pills after you have a baby: If you are not breastfeeding, you may start taking birth control pills 3 weeks after you give birth   You may be able to take certain types of birth control pills if you are breastfeeding  These pills can be started from 6 weeks to 6 months after you give birth  Ask your healthcare provider for more information about when to start taking birth control pills after you give birth  Contact your healthcare provider if:   · You have forgotten to take a birth control pill  · You have mood changes, such as depression, since starting birth control pills  · You have nausea or you are vomiting  · You have severe abdominal pain  · You missed a period and have questions or concerns about being pregnant  · You still have bleeding 4 months after taking birth control pills correctly  · You have questions or concerns about your condition or care  Return to the emergency department if:   · Your arm or leg feels warm, tender, and painful  It may look swollen and red  · You feel lightheaded, short of breath, and have chest pain  · You cough up blood  · You have any of the following signs of a stroke:      ¨ Numbness or drooping on one side of your face     ¨ Weakness in an arm or leg    ¨ Confusion or difficulty speaking    ¨ Dizziness, a severe headache, or vision loss    · You have severe pain, numbness, or swelling in your arms or legs  © 2017 [employer]  Information is for End User's use only and may not be sold, redistributed or otherwise used for commercial purposes  All illustrations and images included in CareNotes® are the copyrighted property of [employer]  or [employer] Kowalski  The above information is an  only  It is not intended as medical advice for individual conditions or treatments  Talk to your doctor, nurse or pharmacist before following any medical regimen to see if it is safe and effective for you  Missed or Late Pills: For combined (Estrogen and Progestin) pills:    If one hormonal pill is LATE (<24 hours since a pill should have been taken): Take the late or missed pill as soon as possible  Continue taking the remaining pills at the usual time (even if it means taking two pills on the same day)  No additional contraceptive protection is needed  Emergency contraception is not usually needed but can be considered if hormonal pills were missed earlier in the cycle or in the last week of the previous cycle  If one hormonal pill has been MISSED (24 to <48 hours since a pill should have been taken): Take the late or missed pill as soon as possible  Continue taking the remaining pills at the usual time (even if it means taking two pills on the same day)  No additional contraceptive protection is needed  Emergency contraception is not usually needed but can be considered if hormonal pills were missed earlier in the cycle or in the last week of the previous cycle  If two or more consecutive hormonal pills have been missed: (less than or equal to 48 hours since a pill should have been taken): Take the most recent missed pill as soon as possible  (Any other missed pills should be discarded ) Continue taking the remaining pills at the usual time (even if it means taking two pills on the same day)  Use back-up contraception (e g , condoms) or avoid sexual intercourse until hormonal pills have been taken for 7 consecutive days  If pills were missed in the last week of hormonal pills (e g , days 15-21 for 28-day pill packs): Omit the hormone-free interval by finishing the horomal pills in the current pack and starting a new pack the next day  If unable to start a new pack immediately, use back-up contraception (e g , condoms) or avoid sexual intercourse until hormonal pills from a new pack have been taken for 7 consecutive days  Emergency contraception should be considered if hormonal pills were missed during the first week and unprotected sexual intercourse occurred in the previous 5 days  Emergency contraception may also be considered at other times as appropriate       Source: [place]  Selected Practice Recommendations for Contraceptive Use, 2013      Call with needs or concerns  Return for Mirena insertion  Schedule annual GYN exam and PAP

## 2019-04-11 ENCOUNTER — PROCEDURE VISIT (OUTPATIENT)
Dept: OBGYN CLINIC | Facility: CLINIC | Age: 24
End: 2019-04-11

## 2019-04-11 VITALS
HEART RATE: 80 BPM | BODY MASS INDEX: 39.37 KG/M2 | SYSTOLIC BLOOD PRESSURE: 142 MMHG | WEIGHT: 236.6 LBS | DIASTOLIC BLOOD PRESSURE: 85 MMHG

## 2019-04-11 DIAGNOSIS — Z30.430 ENCOUNTER FOR INSERTION OF INTRAUTERINE CONTRACEPTIVE DEVICE (IUD): Primary | ICD-10-CM

## 2019-04-11 LAB — SL AMB POCT URINE HCG: NEGATIVE

## 2019-04-11 PROCEDURE — 99213 OFFICE O/P EST LOW 20 MIN: CPT | Performed by: OBSTETRICS & GYNECOLOGY

## 2019-04-11 PROCEDURE — 58300 INSERT INTRAUTERINE DEVICE: CPT | Performed by: OBSTETRICS & GYNECOLOGY

## 2019-04-11 PROCEDURE — 81025 URINE PREGNANCY TEST: CPT | Performed by: OBSTETRICS & GYNECOLOGY

## 2020-03-25 ENCOUNTER — OFFICE VISIT (OUTPATIENT)
Dept: URGENT CARE | Facility: MEDICAL CENTER | Age: 25
End: 2020-03-25
Payer: COMMERCIAL

## 2020-03-25 VITALS
OXYGEN SATURATION: 97 % | HEART RATE: 83 BPM | DIASTOLIC BLOOD PRESSURE: 66 MMHG | RESPIRATION RATE: 17 BRPM | SYSTOLIC BLOOD PRESSURE: 116 MMHG | BODY MASS INDEX: 39.15 KG/M2 | WEIGHT: 235 LBS | HEIGHT: 65 IN | TEMPERATURE: 98.1 F

## 2020-03-25 DIAGNOSIS — R19.8 UMBILICUS DISCHARGE: Primary | ICD-10-CM

## 2020-03-25 PROCEDURE — 87205 SMEAR GRAM STAIN: CPT | Performed by: PHYSICIAN ASSISTANT

## 2020-03-25 PROCEDURE — G0382 LEV 3 HOSP TYPE B ED VISIT: HCPCS | Performed by: PHYSICIAN ASSISTANT

## 2020-03-25 PROCEDURE — 99203 OFFICE O/P NEW LOW 30 MIN: CPT | Performed by: PHYSICIAN ASSISTANT

## 2020-03-25 PROCEDURE — 99283 EMERGENCY DEPT VISIT LOW MDM: CPT | Performed by: PHYSICIAN ASSISTANT

## 2020-03-25 PROCEDURE — 87070 CULTURE OTHR SPECIMN AEROBIC: CPT | Performed by: PHYSICIAN ASSISTANT

## 2020-03-25 RX ORDER — MUPIROCIN CALCIUM 20 MG/G
CREAM TOPICAL 2 TIMES DAILY
Qty: 15 G | Refills: 0 | Status: SHIPPED | OUTPATIENT
Start: 2020-03-25 | End: 2020-03-25 | Stop reason: SDUPTHER

## 2020-03-25 NOTE — PROGRESS NOTES
[facility]        NAME: Dinorah Peterson is a 27 y o  female  : 1995    MRN: [id number]  DATE: 2020  TIME: 2:20 PM    Assessment and Plan   Umbilicus discharge [L64 5]  1  Umbilicus discharge  Wound culture and Gram stain    mupirocin (BACTROBAN) 2 % ointment    DISCONTINUED: mupirocin (BACTROBAN) 2 % cream         Patient Instructions     Use mupirocin ointment twice a day until irritation resolves  Will send out culture  If culture comes back positive for infection, will send antibiotics at that time  Clean twice a day with over-the-counter Bactine, hydrogen peroxide, or alcohol swabs  Follow up with PCP if symptoms do not resolve  Go to the ER if symptoms become severe  Chief Complaint     Chief Complaint   Patient presents with    Wound Infection     possible belly button infection  clear discharge about a week ago  no pain at this time  History of Present Illness       Patient is a 27 y o female presenting for possible belly button infection  Pt states she noticed discharge with a foul smell about a week ago  Clear discharge  Pt states the area is erythematous and itchy  States there is some "crusting" around it  Pt denies injury to the area  Denies pain  Pt states she has never had anything like this in the past   Pt denies N/V/D, fevers, chills  Review of Systems   Review of Systems   Constitutional: Negative for activity change, appetite change, chills, fatigue and fever  Gastrointestinal: Negative for abdominal pain, nausea and vomiting  Musculoskeletal:        Clear discharge from belly button   Skin: Positive for color change and rash  Negative for pallor and wound           Current Medications       Current Outpatient Medications:     mupirocin (BACTROBAN) 2 % ointment, Apply topically 2 (two) times a day, Disp: 22 g, Rfl: 0    Current Allergies     Allergies as of 2020    (No Known Allergies)            The following portions of the patient's history were reviewed and updated as appropriate: allergies, current medications, past family history, past medical history, past social history, past surgical history and problem list      History reviewed  No pertinent past medical history  History reviewed  No pertinent surgical history  Family History   Problem Relation Age of Onset    Hypertension Mother     Diabetes Family     Hypertension Family     Cancer Family          Medications have been verified  Objective   /66   Pulse 83   Temp 98 1 °F (36 7 °C) (Tympanic)   Resp 17   Ht 5' 5" (1 651 m)   Wt 107 kg (235 lb)   SpO2 97%   BMI 39 11 kg/m²        Physical Exam     Physical Exam   Constitutional: She is oriented to person, place, and time  She appears well-developed and well-nourished  No distress  Abdominal: Soft  Bowel sounds are normal  She exhibits no distension  There is no tenderness  There is no rebound  Neurological: She is alert and oriented to person, place, and time  Skin: Skin is warm  No rash noted  She is not diaphoretic  There is erythema  No pallor  Mild ring of irritation on the inside of the belly button  Clear drainage noted inside belly button  Took culture  No pus, no tenderness to palpation  Foul odor coming from belly button  Cleaned with iodine, alcohol swabs, and normal saline    Psychiatric: She has a normal mood and affect  Her behavior is normal    Nursing note and vitals reviewed

## 2020-03-25 NOTE — PATIENT INSTRUCTIONS
Use mupirocin ointment twice a day until irritation resolves  Will send out culture  If culture comes back positive for infection, will send antibiotics at that time  Clean twice a day with over-the-counter Bactine, hydrogen peroxide, or alcohol swabs  Follow up with PCP if symptoms do not resolve  Go to the ER if symptoms become severe  Wound Infection   WHAT YOU NEED TO KNOW:   A wound infection occurs when bacteria enters a break in the skin  The infection may involve just the skin, or affect deeper tissues or organs close to the wound  DISCHARGE INSTRUCTIONS:   Return to the emergency department if:   · You feel short of breath  · Your heart is beating faster than usual      · You feel confused  · Blood soaks through your bandages  · Your wound comes apart or feels like it is ripping  · You have severe pain  · You see red streaks coming from the infected area  Contact your healthcare provider if:   · You have a fever or chills  · You have more pain, redness, or swelling near your wound  · Your symptoms do not improve  · The skin around your wound feels numb  · You have questions or concerns about your condition or care  Medicines: You may need any of the following:  · NSAIDs , such as ibuprofen, help decrease swelling, pain, and fever  This medicine is available with or without a doctor's order  NSAIDs can cause stomach bleeding or kidney problems in certain people  If you take blood thinner medicine, always ask your healthcare provider if NSAIDs are safe for you  Always read the medicine label and follow directions  · Antibiotics  help treat a bacterial infection  · Take your medicine as directed  Contact your healthcare provider if you think your medicine is not helping or if you have side effects  Tell him or her if you are allergic to any medicine  Keep a list of the medicines, vitamins, and herbs you take   Include the amounts, and when and why you take them  Bring the list or the pill bottles to follow-up visits  Carry your medicine list with you in case of an emergency  Care for your wound as directed:  Keep your wound clean and dry  You may need to cover your wound when you bathe so it does not get wet  Clean your wound as directed with soap and water or wound   Put on new, clean bandages as directed  Change your bandages when they get wet or dirty  Help your wound heal:   · Eat a variety of healthy foods  Examples include fruits, vegetables, whole-grain breads, low-fat dairy products, beans, lean meats, and fish  Healthy foods may help you heal faster  You may also need to take vitamins and minerals  Ask if you need to be on a special diet  · Manage other health conditions  Follow your healthcare provider's directions to manage health conditions that can cause slow wound healing  Examples include high blood pressure and diabetes  · Do not smoke  Nicotine and other chemicals in cigarettes and cigars can cause slow wound healing  Ask your healthcare provider for information if you currently smoke and need help to quit  E-cigarettes or smokeless tobacco still contain nicotine  Talk to your healthcare provider before you use these products  Follow up with your healthcare provider in 1 to 2 days:  Write down your questions so you remember to ask them during your visits  © 2017 [employer]  Information is for End User's use only and may not be sold, redistributed or otherwise used for commercial purposes  All illustrations and images included in CareNotes® are the copyrighted property of [employer]  or Carlos Kowalski  The above information is an  only  It is not intended as medical advice for individual conditions or treatments  Talk to your doctor, nurse or pharmacist before following any medical regimen to see if it is safe and effective for you

## 2020-03-27 LAB
BACTERIA WND AEROBE CULT: ABNORMAL
GRAM STN SPEC: ABNORMAL

## 2020-03-31 ENCOUNTER — TELEPHONE (OUTPATIENT)
Dept: URGENT CARE | Facility: MEDICAL CENTER | Age: 25
End: 2020-03-31

## 2020-03-31 NOTE — TELEPHONE ENCOUNTER
Tried to call Yong Cortes about her culture results and the number provided was the wrong number  If she calls back, will inform her of her culture results at that time

## 2020-08-31 ENCOUNTER — TELEPHONE (OUTPATIENT)
Dept: FAMILY MEDICINE CLINIC | Facility: CLINIC | Age: 25
End: 2020-08-31

## 2020-08-31 NOTE — TELEPHONE ENCOUNTER
----- [id number] sent at 8/24/2020 10:08 AM EDT -----  Regarding: well  Recommend yearly well visit

## 2021-08-11 ENCOUNTER — OFFICE VISIT (OUTPATIENT)
Dept: URGENT CARE | Age: 26
End: 2021-08-11
Payer: COMMERCIAL

## 2021-08-11 VITALS
DIASTOLIC BLOOD PRESSURE: 67 MMHG | RESPIRATION RATE: 17 BRPM | HEIGHT: 65 IN | SYSTOLIC BLOOD PRESSURE: 152 MMHG | WEIGHT: 271 LBS | HEART RATE: 87 BPM | TEMPERATURE: 98 F | OXYGEN SATURATION: 95 % | BODY MASS INDEX: 45.15 KG/M2

## 2021-08-11 DIAGNOSIS — S61.219A LACERATION WITHOUT FOREIGN BODY OF UNSPECIFIED FINGER WITHOUT DAMAGE TO NAIL, INITIAL ENCOUNTER: Primary | ICD-10-CM

## 2021-08-11 DIAGNOSIS — T14.90XD HEALING WOUND: ICD-10-CM

## 2021-08-11 PROCEDURE — 90715 TDAP VACCINE 7 YRS/> IM: CPT

## 2021-08-11 PROCEDURE — G0382 LEV 3 HOSP TYPE B ED VISIT: HCPCS | Performed by: NURSE PRACTITIONER

## 2021-08-11 PROCEDURE — 90471 IMMUNIZATION ADMIN: CPT | Performed by: NURSE PRACTITIONER

## 2021-08-11 NOTE — PROGRESS NOTES
NAME: Rajiv García is a 27 y o  female  : 1995    MRN: [id number]    /67 (BP Location: Left arm, Patient Position: Sitting, Cuff Size: Adult)   Pulse 87   Temp 98 °F (36 7 °C) (Tympanic)   Resp 17   Ht 5' 5" (1 651 m)   Wt 123 kg (271 lb)   SpO2 95%   BMI 45 10 kg/m²     Assessment and Plan   Laceration without foreign body of unspecified finger without damage to nail, initial encounter [S61 219A]  1  Laceration without foreign body of unspecified finger without damage to nail, initial encounter  TDAP VACCINE GREATER THAN OR EQUAL TO 8YO IM   2  Healing wound         Mercedes was seen today for hand pain  Diagnoses and all orders for this visit:    Laceration without foreign body of unspecified finger without damage to nail, initial encounter  -     TDAP VACCINE GREATER THAN OR EQUAL TO 8YO IM    Healing wound        Patient Instructions   Patient Instructions   Tetanus shot given today  Wound is healing well, continue to keep it clean, cover while working, use topical antibiotic ointment to the area 2 times a day  Denies filing a workers comp injury    Follow up with pcp       Proceed to the nearest ER if symptoms worsen, Follow up with your PCP  Continue to social distance, wash your hands, and wear your masks  Please continue to follow the CDC  gov guidelines daily for they are subject to change on COVID-19    Chief Complaint     Chief Complaint   Patient presents with    Hand Pain     yesterday at work, pt got a cut from a  across dorsal left hand  pt unsure of her last tetanus  History of Present Illness     28 yo female here today with a healing laceration to the right hand, dorsal side, no signs of infection, full ROM of the right hand  It happened while opening a box and cut self with   Occurred at work, denies filing a workers comp injury at this time  Pt came in for tetanus shot         Review of Systems   Review of Systems   Skin: Positive for wound (dorsal jose right hand)  Current Medications       Current Outpatient Medications:     mupirocin (BACTROBAN) 2 % ointment, Apply topically 2 (two) times a day (Patient not taking: Reported on 8/11/2021), Disp: 22 g, Rfl: 0    Current Allergies     Allergies as of 08/11/2021    (No Known Allergies)              History reviewed  No pertinent past medical history  History reviewed  No pertinent surgical history  Family History   Problem Relation Age of Onset    Hypertension Mother     Diabetes Family     Hypertension Family     Cancer Family          Medications have been verified  The following portions of the patient's history were reviewed and updated as appropriate: allergies, current medications, past family history, past medical history, past social history, past surgical history and problem list     Objective   /67 (BP Location: Left arm, Patient Position: Sitting, Cuff Size: Adult)   Pulse 87   Temp 98 °F (36 7 °C) (Tympanic)   Resp 17   Ht 5' 5" (1 651 m)   Wt 123 kg (271 lb)   SpO2 95%   BMI 45 10 kg/m²      Physical Exam     Physical Exam  Constitutional:       Appearance: Normal appearance  Musculoskeletal:      Right hand: Laceration (healing on dorsal side of right hand) present  No swelling, deformity or tenderness  Comments: No swelling, no drainage, no s/s of infection, full ROM, contine to use right hand   Neurological:      Mental Status: She is alert  Note: Portions of this record may have been created with voice recognition software  Occasional wrong word or "sound a like" substitutions may have occurred due to the inherent limitations of voice recognition software  Please read the chart carefully and recognize, using context, where substitutions have occurred  EVIN Balbuena

## 2021-08-11 NOTE — PATIENT INSTRUCTIONS
Tetanus shot given today  Wound is healing well, continue to keep it clean, cover while working, use topical antibiotic ointment to the area 2 times a day     Denies filing a workers comp injury    Follow up with pcp

## 2023-04-25 ENCOUNTER — APPOINTMENT (EMERGENCY)
Dept: CT IMAGING | Facility: HOSPITAL | Age: 28
End: 2023-04-25

## 2023-04-25 ENCOUNTER — HOSPITAL ENCOUNTER (OUTPATIENT)
Facility: HOSPITAL | Age: 28
Setting detail: OUTPATIENT SURGERY
Discharge: HOME HEALTH | End: 2023-04-27
Attending: EMERGENCY MEDICINE | Admitting: SURGERY

## 2023-04-25 ENCOUNTER — ANESTHESIA EVENT (OUTPATIENT)
Dept: PERIOP | Facility: HOSPITAL | Age: 28
End: 2023-04-25

## 2023-04-25 ENCOUNTER — ANESTHESIA (OUTPATIENT)
Dept: PERIOP | Facility: HOSPITAL | Age: 28
End: 2023-04-25

## 2023-04-25 DIAGNOSIS — R10.9 ABDOMINAL PAIN: Primary | ICD-10-CM

## 2023-04-25 DIAGNOSIS — L02.216 ABSCESS, UMBILICAL: ICD-10-CM

## 2023-04-25 DIAGNOSIS — K42.9 UMBILICAL HERNIA: ICD-10-CM

## 2023-04-25 DIAGNOSIS — L03.90 CELLULITIS: ICD-10-CM

## 2023-04-25 LAB
ANION GAP SERPL CALCULATED.3IONS-SCNC: 13 MMOL/L (ref 4–13)
BASOPHILS # BLD AUTO: 0.04 THOUSANDS/ÂΜL (ref 0–0.1)
BASOPHILS NFR BLD AUTO: 0 % (ref 0–1)
BUN SERPL-MCNC: 14 MG/DL (ref 5–25)
CALCIUM SERPL-MCNC: 9.3 MG/DL (ref 8.4–10.2)
CHLORIDE SERPL-SCNC: 102 MMOL/L (ref 96–108)
CO2 SERPL-SCNC: 23 MMOL/L (ref 21–32)
CREAT SERPL-MCNC: 0.7 MG/DL (ref 0.6–1.3)
EOSINOPHIL # BLD AUTO: 0.04 THOUSAND/ÂΜL (ref 0–0.61)
EOSINOPHIL NFR BLD AUTO: 0 % (ref 0–6)
ERYTHROCYTE [DISTWIDTH] IN BLOOD BY AUTOMATED COUNT: 12.6 % (ref 11.6–15.1)
EXT PREGNANCY TEST URINE: NEGATIVE
EXT. CONTROL: NORMAL
GFR SERPL CREATININE-BSD FRML MDRD: 120 ML/MIN/1.73SQ M
GLUCOSE SERPL-MCNC: 94 MG/DL (ref 65–140)
HCT VFR BLD AUTO: 44.4 % (ref 34.8–46.1)
HGB BLD-MCNC: 14.6 G/DL (ref 11.5–15.4)
IMM GRANULOCYTES # BLD AUTO: 0.06 THOUSAND/UL (ref 0–0.2)
IMM GRANULOCYTES NFR BLD AUTO: 1 % (ref 0–2)
LACTATE SERPL-SCNC: 0.8 MMOL/L (ref 0.5–2)
LYMPHOCYTES # BLD AUTO: 0.96 THOUSANDS/ÂΜL (ref 0.6–4.47)
LYMPHOCYTES NFR BLD AUTO: 8 % (ref 14–44)
MCH RBC QN AUTO: 27.9 PG (ref 26.8–34.3)
MCHC RBC AUTO-ENTMCNC: 32.9 G/DL (ref 31.4–37.4)
MCV RBC AUTO: 85 FL (ref 82–98)
MONOCYTES # BLD AUTO: 0.86 THOUSAND/ÂΜL (ref 0.17–1.22)
MONOCYTES NFR BLD AUTO: 7 % (ref 4–12)
NEUTROPHILS # BLD AUTO: 10.67 THOUSANDS/ÂΜL (ref 1.85–7.62)
NEUTS SEG NFR BLD AUTO: 84 % (ref 43–75)
NRBC BLD AUTO-RTO: 0 /100 WBCS
PLATELET # BLD AUTO: 231 THOUSANDS/UL (ref 149–390)
PMV BLD AUTO: 9.8 FL (ref 8.9–12.7)
POTASSIUM SERPL-SCNC: 3.2 MMOL/L (ref 3.5–5.3)
RBC # BLD AUTO: 5.23 MILLION/UL (ref 3.81–5.12)
SODIUM SERPL-SCNC: 138 MMOL/L (ref 135–147)
WBC # BLD AUTO: 12.63 THOUSAND/UL (ref 4.31–10.16)

## 2023-04-25 PROCEDURE — 96375 TX/PRO/DX INJ NEW DRUG ADDON: CPT

## 2023-04-25 PROCEDURE — 87070 CULTURE OTHR SPECIMN AEROBIC: CPT | Performed by: SURGERY

## 2023-04-25 PROCEDURE — 85025 COMPLETE CBC W/AUTO DIFF WBC: CPT

## 2023-04-25 PROCEDURE — 96365 THER/PROPH/DIAG IV INF INIT: CPT

## 2023-04-25 PROCEDURE — 96367 TX/PROPH/DG ADDL SEQ IV INF: CPT

## 2023-04-25 PROCEDURE — 87185 SC STD ENZYME DETCJ PER NZM: CPT | Performed by: SURGERY

## 2023-04-25 PROCEDURE — 49250 EXCISION OF UMBILICUS: CPT | Performed by: SURGERY

## 2023-04-25 PROCEDURE — 87075 CULTR BACTERIA EXCEPT BLOOD: CPT | Performed by: SURGERY

## 2023-04-25 PROCEDURE — 88342 IMHCHEM/IMCYTCHM 1ST ANTB: CPT | Performed by: SPECIALIST

## 2023-04-25 PROCEDURE — 36415 COLL VENOUS BLD VENIPUNCTURE: CPT

## 2023-04-25 PROCEDURE — 87205 SMEAR GRAM STAIN: CPT | Performed by: SURGERY

## 2023-04-25 PROCEDURE — 83605 ASSAY OF LACTIC ACID: CPT

## 2023-04-25 PROCEDURE — 88312 SPECIAL STAINS GROUP 1: CPT | Performed by: SPECIALIST

## 2023-04-25 PROCEDURE — 96366 THER/PROPH/DIAG IV INF ADDON: CPT

## 2023-04-25 PROCEDURE — 87077 CULTURE AEROBIC IDENTIFY: CPT | Performed by: SURGERY

## 2023-04-25 PROCEDURE — 80048 BASIC METABOLIC PNL TOTAL CA: CPT

## 2023-04-25 PROCEDURE — 99285 EMERGENCY DEPT VISIT HI MDM: CPT | Performed by: EMERGENCY MEDICINE

## 2023-04-25 PROCEDURE — 88304 TISSUE EXAM BY PATHOLOGIST: CPT | Performed by: SPECIALIST

## 2023-04-25 PROCEDURE — 81025 URINE PREGNANCY TEST: CPT

## 2023-04-25 PROCEDURE — 99204 OFFICE O/P NEW MOD 45 MIN: CPT | Performed by: SURGERY

## 2023-04-25 PROCEDURE — 99285 EMERGENCY DEPT VISIT HI MDM: CPT

## 2023-04-25 PROCEDURE — 74177 CT ABD & PELVIS W/CONTRAST: CPT

## 2023-04-25 PROCEDURE — 96361 HYDRATE IV INFUSION ADD-ON: CPT

## 2023-04-25 RX ORDER — ROCURONIUM BROMIDE 10 MG/ML
INJECTION, SOLUTION INTRAVENOUS AS NEEDED
Status: DISCONTINUED | OUTPATIENT
Start: 2023-04-25 | End: 2023-04-25

## 2023-04-25 RX ORDER — ONDANSETRON 2 MG/ML
4 INJECTION INTRAMUSCULAR; INTRAVENOUS EVERY 6 HOURS PRN
Status: DISCONTINUED | OUTPATIENT
Start: 2023-04-25 | End: 2023-04-27 | Stop reason: HOSPADM

## 2023-04-25 RX ORDER — MAGNESIUM HYDROXIDE 1200 MG/15ML
LIQUID ORAL AS NEEDED
Status: DISCONTINUED | OUTPATIENT
Start: 2023-04-25 | End: 2023-04-25 | Stop reason: HOSPADM

## 2023-04-25 RX ORDER — OXYCODONE HYDROCHLORIDE 5 MG/1
5 TABLET ORAL EVERY 4 HOURS PRN
Status: DISCONTINUED | OUTPATIENT
Start: 2023-04-25 | End: 2023-04-27 | Stop reason: HOSPADM

## 2023-04-25 RX ORDER — LIDOCAINE HYDROCHLORIDE 20 MG/ML
INJECTION, SOLUTION EPIDURAL; INFILTRATION; INTRACAUDAL; PERINEURAL AS NEEDED
Status: DISCONTINUED | OUTPATIENT
Start: 2023-04-25 | End: 2023-04-25

## 2023-04-25 RX ORDER — SODIUM CHLORIDE, SODIUM GLUCONATE, SODIUM ACETATE, POTASSIUM CHLORIDE, MAGNESIUM CHLORIDE, SODIUM PHOSPHATE, DIBASIC, AND POTASSIUM PHOSPHATE .53; .5; .37; .037; .03; .012; .00082 G/100ML; G/100ML; G/100ML; G/100ML; G/100ML; G/100ML; G/100ML
500 INJECTION, SOLUTION INTRAVENOUS ONCE
Status: COMPLETED | OUTPATIENT
Start: 2023-04-25 | End: 2023-04-25

## 2023-04-25 RX ORDER — METRONIDAZOLE 500 MG/100ML
500 INJECTION, SOLUTION INTRAVENOUS EVERY 8 HOURS
Status: DISCONTINUED | OUTPATIENT
Start: 2023-04-25 | End: 2023-04-27 | Stop reason: HOSPADM

## 2023-04-25 RX ORDER — ACETAMINOPHEN 325 MG/1
975 TABLET ORAL EVERY 8 HOURS SCHEDULED
Status: DISCONTINUED | OUTPATIENT
Start: 2023-04-25 | End: 2023-04-27 | Stop reason: HOSPADM

## 2023-04-25 RX ORDER — SODIUM CHLORIDE, SODIUM LACTATE, POTASSIUM CHLORIDE, CALCIUM CHLORIDE 600; 310; 30; 20 MG/100ML; MG/100ML; MG/100ML; MG/100ML
125 INJECTION, SOLUTION INTRAVENOUS CONTINUOUS
Status: CANCELLED | OUTPATIENT
Start: 2023-04-25

## 2023-04-25 RX ORDER — ONDANSETRON 2 MG/ML
INJECTION INTRAMUSCULAR; INTRAVENOUS AS NEEDED
Status: DISCONTINUED | OUTPATIENT
Start: 2023-04-25 | End: 2023-04-25

## 2023-04-25 RX ORDER — MIDAZOLAM HYDROCHLORIDE 2 MG/2ML
INJECTION, SOLUTION INTRAMUSCULAR; INTRAVENOUS AS NEEDED
Status: DISCONTINUED | OUTPATIENT
Start: 2023-04-25 | End: 2023-04-25

## 2023-04-25 RX ORDER — PROPOFOL 10 MG/ML
INJECTION, EMULSION INTRAVENOUS AS NEEDED
Status: DISCONTINUED | OUTPATIENT
Start: 2023-04-25 | End: 2023-04-25

## 2023-04-25 RX ORDER — SODIUM CHLORIDE, SODIUM LACTATE, POTASSIUM CHLORIDE, CALCIUM CHLORIDE 600; 310; 30; 20 MG/100ML; MG/100ML; MG/100ML; MG/100ML
75 INJECTION, SOLUTION INTRAVENOUS CONTINUOUS
Status: DISCONTINUED | OUTPATIENT
Start: 2023-04-25 | End: 2023-04-26

## 2023-04-25 RX ORDER — CEFAZOLIN SODIUM 2 G/50ML
2000 SOLUTION INTRAVENOUS
Status: COMPLETED | OUTPATIENT
Start: 2023-04-25 | End: 2023-04-25

## 2023-04-25 RX ORDER — OXYCODONE HYDROCHLORIDE 10 MG/1
10 TABLET ORAL EVERY 4 HOURS PRN
Status: DISCONTINUED | OUTPATIENT
Start: 2023-04-25 | End: 2023-04-27 | Stop reason: HOSPADM

## 2023-04-25 RX ORDER — HYDROMORPHONE HCL/PF 1 MG/ML
0.5 SYRINGE (ML) INJECTION
Status: DISCONTINUED | OUTPATIENT
Start: 2023-04-25 | End: 2023-04-27 | Stop reason: HOSPADM

## 2023-04-25 RX ORDER — CEFAZOLIN SODIUM 2 G/50ML
2000 SOLUTION INTRAVENOUS EVERY 8 HOURS
Status: DISCONTINUED | OUTPATIENT
Start: 2023-04-25 | End: 2023-04-27 | Stop reason: HOSPADM

## 2023-04-25 RX ORDER — CEFAZOLIN SODIUM 1 G/3ML
INJECTION, POWDER, FOR SOLUTION INTRAMUSCULAR; INTRAVENOUS AS NEEDED
Status: DISCONTINUED | OUTPATIENT
Start: 2023-04-25 | End: 2023-04-25

## 2023-04-25 RX ORDER — FENTANYL CITRATE 50 UG/ML
INJECTION, SOLUTION INTRAMUSCULAR; INTRAVENOUS AS NEEDED
Status: DISCONTINUED | OUTPATIENT
Start: 2023-04-25 | End: 2023-04-25

## 2023-04-25 RX ORDER — HEPARIN SODIUM 5000 [USP'U]/ML
5000 INJECTION, SOLUTION INTRAVENOUS; SUBCUTANEOUS EVERY 8 HOURS SCHEDULED
Status: DISCONTINUED | OUTPATIENT
Start: 2023-04-25 | End: 2023-04-27 | Stop reason: HOSPADM

## 2023-04-25 RX ORDER — POTASSIUM CHLORIDE 20 MEQ/1
40 TABLET, EXTENDED RELEASE ORAL ONCE
Status: COMPLETED | OUTPATIENT
Start: 2023-04-25 | End: 2023-04-25

## 2023-04-25 RX ORDER — HYDROMORPHONE HCL/PF 1 MG/ML
0.5 SYRINGE (ML) INJECTION
Status: DISCONTINUED | OUTPATIENT
Start: 2023-04-25 | End: 2023-04-25

## 2023-04-25 RX ORDER — KETOROLAC TROMETHAMINE 30 MG/ML
15 INJECTION, SOLUTION INTRAMUSCULAR; INTRAVENOUS ONCE
Status: COMPLETED | OUTPATIENT
Start: 2023-04-25 | End: 2023-04-25

## 2023-04-25 RX ORDER — METHOCARBAMOL 500 MG/1
500 TABLET, FILM COATED ORAL EVERY 6 HOURS PRN
Status: DISCONTINUED | OUTPATIENT
Start: 2023-04-25 | End: 2023-04-27 | Stop reason: HOSPADM

## 2023-04-25 RX ORDER — ONDANSETRON 2 MG/ML
4 INJECTION INTRAMUSCULAR; INTRAVENOUS ONCE AS NEEDED
Status: DISCONTINUED | OUTPATIENT
Start: 2023-04-25 | End: 2023-04-25

## 2023-04-25 RX ORDER — OXYCODONE HYDROCHLORIDE 5 MG/1
5 TABLET ORAL EVERY 4 HOURS PRN
Status: DISCONTINUED | OUTPATIENT
Start: 2023-04-25 | End: 2023-04-25

## 2023-04-25 RX ADMIN — OXYCODONE 5 MG: 5 TABLET ORAL at 19:57

## 2023-04-25 RX ADMIN — HEPARIN SODIUM 5000 UNITS: 5000 INJECTION INTRAVENOUS; SUBCUTANEOUS at 22:52

## 2023-04-25 RX ADMIN — FENTANYL CITRATE 50 MCG: 50 INJECTION, SOLUTION INTRAMUSCULAR; INTRAVENOUS at 13:10

## 2023-04-25 RX ADMIN — CEFAZOLIN 2000 MG: 1 INJECTION, POWDER, FOR SOLUTION INTRAMUSCULAR; INTRAVENOUS at 13:03

## 2023-04-25 RX ADMIN — FENTANYL CITRATE 100 MCG: 50 INJECTION, SOLUTION INTRAMUSCULAR; INTRAVENOUS at 13:01

## 2023-04-25 RX ADMIN — ACETAMINOPHEN 325MG 975 MG: 325 TABLET ORAL at 15:21

## 2023-04-25 RX ADMIN — POTASSIUM CHLORIDE 40 MEQ: 1500 TABLET, EXTENDED RELEASE ORAL at 06:56

## 2023-04-25 RX ADMIN — KETOROLAC TROMETHAMINE 15 MG: 30 INJECTION, SOLUTION INTRAMUSCULAR; INTRAVENOUS at 07:55

## 2023-04-25 RX ADMIN — METRONIDAZOLE 500 MG: 500 INJECTION, SOLUTION INTRAVENOUS at 22:54

## 2023-04-25 RX ADMIN — FENTANYL CITRATE 50 MCG: 50 INJECTION, SOLUTION INTRAMUSCULAR; INTRAVENOUS at 13:28

## 2023-04-25 RX ADMIN — CEFAZOLIN SODIUM 2000 MG: 2 SOLUTION INTRAVENOUS at 20:08

## 2023-04-25 RX ADMIN — PROPOFOL 200 MG: 10 INJECTION, EMULSION INTRAVENOUS at 13:02

## 2023-04-25 RX ADMIN — ONDANSETRON 4 MG: 2 INJECTION INTRAMUSCULAR; INTRAVENOUS at 13:26

## 2023-04-25 RX ADMIN — SUGAMMADEX 200 MG: 100 INJECTION, SOLUTION INTRAVENOUS at 13:25

## 2023-04-25 RX ADMIN — SODIUM CHLORIDE, SODIUM GLUCONATE, SODIUM ACETATE, POTASSIUM CHLORIDE, MAGNESIUM CHLORIDE, SODIUM PHOSPHATE, DIBASIC, AND POTASSIUM PHOSPHATE 500 ML: .53; .5; .37; .037; .03; .012; .00082 INJECTION, SOLUTION INTRAVENOUS at 06:44

## 2023-04-25 RX ADMIN — ACETAMINOPHEN 325MG 975 MG: 325 TABLET ORAL at 09:44

## 2023-04-25 RX ADMIN — HEPARIN SODIUM 5000 UNITS: 5000 INJECTION INTRAVENOUS; SUBCUTANEOUS at 15:21

## 2023-04-25 RX ADMIN — METHOCARBAMOL 500 MG: 500 TABLET ORAL at 21:32

## 2023-04-25 RX ADMIN — CEFAZOLIN SODIUM 2000 MG: 2 SOLUTION INTRAVENOUS at 10:56

## 2023-04-25 RX ADMIN — CEFTRIAXONE SODIUM 1000 MG: 10 INJECTION, POWDER, FOR SOLUTION INTRAVENOUS at 07:55

## 2023-04-25 RX ADMIN — MIDAZOLAM 2 MG: 1 INJECTION INTRAMUSCULAR; INTRAVENOUS at 12:57

## 2023-04-25 RX ADMIN — SODIUM CHLORIDE, SODIUM LACTATE, POTASSIUM CHLORIDE, AND CALCIUM CHLORIDE 125 ML/HR: .6; .31; .03; .02 INJECTION, SOLUTION INTRAVENOUS at 09:44

## 2023-04-25 RX ADMIN — ACETAMINOPHEN 325MG 975 MG: 325 TABLET ORAL at 22:52

## 2023-04-25 RX ADMIN — METRONIDAZOLE 500 MG: 500 INJECTION, SOLUTION INTRAVENOUS at 15:22

## 2023-04-25 RX ADMIN — IOHEXOL 100 ML: 350 INJECTION, SOLUTION INTRAVENOUS at 07:22

## 2023-04-25 RX ADMIN — ROCURONIUM BROMIDE 40 MG: 50 INJECTION, SOLUTION INTRAVENOUS at 13:02

## 2023-04-25 RX ADMIN — LIDOCAINE HYDROCHLORIDE 100 MG: 20 INJECTION, SOLUTION EPIDURAL; INFILTRATION; INTRACAUDAL at 13:02

## 2023-04-25 NOTE — OP NOTE
OPERATIVE REPORT  PATIENT NAME: Tomas Lr    :  1995  MRN: [id number]  Pt Location: [facility] OR ROOM 01    SURGERY DATE: 2023    Surgeon(s) and Role:     * Silverio Wood MD - Primary     * Radha Moran MD - Assisting    Preop Diagnosis:  Umbilical hernia [E75 0]    Post-Op Diagnosis Codes:     * Umbilical hernia [Z66 9]    Procedure(s):  EXCISIONAL DEBRIDEMENT UMBILICAL HERNIA    Specimen(s):  ID Type Source Tests Collected by Time Destination   1 : Urachal Stalk infected umbilicus Tissue Soft Tissue, Other TISSUE EXAM Silverio Wood MD 2023 [id number]    A : umbilical wound cultures Tissue Soft Tissue, Other ANAEROBIC CULTURE AND GRAM STAIN, WOUND CULTURE Silverio Wood MD 2023 1316        Estimated Blood Loss:   Minimal    Drains:  * No LDAs found *    Anesthesia Type:   General    Operative Indications:  Umbilical hernia [Y36 7]    Operative Findings:  - abscess cavity with extensive purulence drained and cultures taken; cavity debrided of necrotic and nonviable tissue  - umbilical stalk divided to access full cavity - urachal remnant/sinus tract present?  - betadine soaked gauze packed into wound    Complications:   None    Procedure and Technique: This is a 24year old female who presents with umbilical abscess/cellulitis with fascial defect on CT scan in need of umbilical incision and drainage with hernia repair  All risks, benefits, and alternatives were discussed with the patient who agreed with the plan of care as stated  Patient was identified by armband and verbal communication and brought back to the operating room  She was induced by general anesthesia via endotracheal intubation  Her abdomen was clipped and cleansed then subsequently prepped with ChloraPrep and draped in usual sterile fashion  A time-out was called to review the procedure and its details  Antibiotics were administered      After instillation of local anesthetic, an infraumbilical curvilinear incision was made and carried down through skin until a pocket of purulence under pressure was encountered  This purulence was cultured and suctioned free  In order to fully explore the abscess cavity the umbilical stalk was detached sharply from the underlying fascia without violating skin of the umbilicus  Necrotic and nonviable tissue was debrided sharply with bovie electrocautery back to healthy fascia  The fascia was palpated and inspected but no defect was encountered  Suspected etiology of abscess was urachal remnant within umbilical stalk  Abscess cavity at completion measured 5 x 3 x 3 cm  This was irrigated extensively with warm normal saline until the effluent ran clear  Surgical incision was then packed with betadine-soaked kerlix as was the skin of the umbilicus which appeared significantly macerated given the infectious process (for two pieces of betadine kerlix total)  Induration which extended inferiorly was marked  Umbilicus was then covered with a dry sterile dressing  Patient was then woken from general anesthesia and brought to the PACU in stable condition  All instrument, needle, and sponge counts were correct at the conclusion of the case  Radiofrequency scanning was negative  Dr Shira Ding was present for the entirety of the operation      Patient Disposition:  PACU         SIGNATURE: Emily Zendejas MD  DATE: April 25, 2023  TIME: 1:49 PM

## 2023-04-25 NOTE — PLAN OF CARE
Problem: PAIN - ADULT  Goal: Verbalizes/displays adequate comfort level or baseline comfort level  Description: Interventions:  - Encourage patient to monitor pain and request assistance  - Assess pain using appropriate pain scale  - Administer analgesics based on type and severity of pain and evaluate response  - Implement non-pharmacological measures as appropriate and evaluate response  - Consider cultural and social influences on pain and pain management  - Notify physician/advanced practitioner if interventions unsuccessful or patient reports new pain  Outcome: Progressing     Problem: INFECTION - ADULT  Goal: Absence or prevention of progression during hospitalization  Description: INTERVENTIONS:  - Assess and monitor for signs and symptoms of infection  - Monitor lab/diagnostic results  - Monitor all insertion sites, i e  indwelling lines, tubes, and drains  - Monitor endotracheal if appropriate and nasal secretions for changes in amount and color  - [place] appropriate cooling/warming therapies per order  - Administer medications as ordered  - Instruct and encourage patient and family to use good hand hygiene technique  - Identify and instruct in appropriate isolation precautions for identified infection/condition  Outcome: Progressing  Goal: Absence of fever/infection during neutropenic period  Description: INTERVENTIONS:  - Monitor WBC    Outcome: Progressing     Problem: SAFETY ADULT  Goal: Patient will remain free of falls  Description: INTERVENTIONS:  - Educate patient/family on patient safety including physical limitations  - Instruct patient to call for assistance with activity   - Consult OT/PT to assist with strengthening/mobility   - Keep Call bell within reach  - Keep bed low and locked with side rails adjusted as appropriate  - Keep care items and personal belongings within reach  - Initiate and maintain comfort rounds  - Make Fall Risk Sign visible to staff  - Offer Toileting every 2 Hours, in advance of need  - Initiate/Maintain bed alarm, if needed  - Obtain necessary fall risk management equipment  - Apply yellow socks and bracelet for high fall risk patients  - Consider moving patient to room near nurses station  Outcome: Progressing  Goal: Maintain or return to baseline ADL function  Description: INTERVENTIONS:  -  Assess patient's ability to carry out ADLs; assess patient's baseline for ADL function and identify physical deficits which impact ability to perform ADLs (bathing, care of mouth/teeth, toileting, grooming, dressing, etc )  - Assess/evaluate cause of self-care deficits   - Assess range of motion  - Assess patient's mobility; develop plan if impaired  - Assess patient's need for assistive devices and provide as appropriate  - Encourage maximum independence but intervene and supervise when necessary  - Involve family in performance of ADLs  - Assess for home care needs following discharge   - Consider OT consult to assist with ADL evaluation and planning for discharge  - Provide patient education as appropriate  Outcome: Progressing  Goal: Maintains/Returns to pre admission functional level  Description: INTERVENTIONS:  - Perform BMAT or MOVE assessment daily    - Set and communicate daily mobility goal to care team and patient/family/caregiver     - Collaborate with rehabilitation services on mobility goals if consulted  - Ambulate patient   - Out of bed to chair   - Out of bed for meals   - Out of bed for toileting  - Record patient progress and toleration of activity level   Outcome: Progressing     Problem: DISCHARGE PLANNING  Goal: Discharge to home or other facility with appropriate resources  Description: INTERVENTIONS:  - Identify barriers to discharge w/patient and caregiver  - Arrange for needed discharge resources and transportation as appropriate  - Identify discharge learning needs (meds, wound care, etc )  - Arrange for interpretive services to assist at discharge as needed  - Refer to Case Management Department for coordinating discharge planning if the patient needs post-hospital services based on physician/advanced practitioner order or complex needs related to functional status, cognitive ability, or social support system  Outcome: Progressing     Problem: Knowledge Deficit  Goal: Patient/family/caregiver demonstrates understanding of disease process, treatment plan, medications, and discharge instructions  Description: Complete learning assessment and assess knowledge base    Interventions:  - Provide teaching at level of understanding  - Provide teaching via preferred learning methods  Outcome: Progressing

## 2023-04-25 NOTE — ANESTHESIA POSTPROCEDURE EVALUATION
"Post-Op Assessment Note    CV Status:  Stable    Pain management: adequate     Mental Status:  Alert and awake   Hydration Status:  Euvolemic   PONV Controlled:  Controlled   Airway Patency:  Patent      Post Op Vitals Reviewed: Yes      Staff: Anesthesiologist         No notable events documented    /72   Pulse 80   Temp 98 4 °F (36 9 °C)   Resp 16   Ht 5' 5\" (1 651 m)   Wt 87 8 kg (193 lb 9 oz)   SpO2 98%   BMI 32 21 kg/m²     "

## 2023-04-25 NOTE — ED ATTENDING ATTESTATION
4/25/2023  I, Karlie Lea MD, saw and evaluated the patient  I have discussed the patient with the resident/non-physician practitioner and agree with the resident's/non-physician practitioner's findings, Plan of Care, and MDM as documented in the resident's/non-physician practitioner's note, except where noted  All available labs and Radiology studies were reviewed  I was present for key portions of any procedure(s) performed by the resident/non-physician practitioner and I was immediately available to provide assistance  At this point I agree with the current assessment done in the Emergency Department  I have conducted an independent evaluation of this patient a history and physical is as follows:  25 yo F c/o pain localized to umbilicus where she has a chronic umbilical hernia, for which she has consultation later this week for surgery, associated with some discharged from the area  No fever  No N/V  VS notable for tachycardia  ED workup considering incarcerated hernia, possibly strangulated, and associated with cellulitis, in the skin fold  CT concerning for strangulating hernia, with fluid collection    Treating with abx, and surgical consult    ED Course         Critical Care Time  Procedures

## 2023-04-25 NOTE — ED PROVIDER NOTES
"History  Chief Complaint   Patient presents with   • Abdominal Pain     Pt states she has an umbilical hernia and has an appointment with general surgery on Thursday, but now area is more swollen and has a \"stinky\" discharge     22-year-old female presenting to the emergency department for evaluation of abdominal pain  Patient states that she has a known umbilical hernia but she has a scheduled appointment with surgery on Thursday however she woke up today with excruciating pain surrounding her bellybutton  Patient states that it is more swollen as well as more tender to palpation  Patient also notes that now it has a sticky discharge  She denies any fevers or chills chest pain or shortness of breath she denies any nausea or vomiting diarrhea or constipation she denies any dysuria or hematuria  Patient tried over-the-counter medications without any alleviation of her symptoms  Patient has also been wearing an abdominal binder to try and compress the area  Prior to Admission Medications   Prescriptions Last Dose Informant Patient Reported? Taking?   mupirocin (BACTROBAN) 2 % ointment Not Taking  No No   Sig: Apply topically 2 (two) times a day   Patient not taking: Reported on 8/11/2021      Facility-Administered Medications: None       History reviewed  No pertinent past medical history  History reviewed  No pertinent surgical history  Family History   Problem Relation Age of Onset   • Hypertension Mother    • Diabetes Family    • Hypertension Family    • Cancer Family      I have reviewed and agree with the history as documented      E-Cigarette/Vaping   • E-Cigarette Use Current Some Day User      E-Cigarette/Vaping Substances     Social History     Tobacco Use   • Smoking status: Never     Passive exposure: Never   • Smokeless tobacco: Never   Vaping Use   • Vaping Use: Some days   Substance Use Topics   • Alcohol use: Yes     Comment: occasionally   • Drug use: No        Review of Systems " Constitutional: Negative for activity change, chills and fever  HENT: Negative for congestion, ear pain and sore throat  Eyes: Negative for photophobia, pain and visual disturbance  Respiratory: Negative for cough, chest tightness, shortness of breath and wheezing  Cardiovascular: Negative for chest pain and palpitations  Gastrointestinal: Positive for abdominal pain  Negative for constipation, diarrhea, nausea and vomiting  Genitourinary: Negative for dysuria and hematuria  Musculoskeletal: Negative for arthralgias, back pain, neck pain and neck stiffness  Skin: Negative for color change and rash  Neurological: Negative for dizziness, seizures, syncope, weakness, numbness and headaches  Psychiatric/Behavioral: Negative for agitation and behavioral problems  All other systems reviewed and are negative  Physical Exam  ED Triage Vitals [04/25/23 0528]   Temperature Pulse Respirations Blood Pressure SpO2   98 4 °F (36 9 °C) (!) 114 16 143/86 98 %      Temp Source Heart Rate Source Patient Position - Orthostatic VS BP Location FiO2 (%)   Oral Monitor Sitting Right arm --      Pain Score       5             Orthostatic Vital Signs  Vitals:    04/25/23 0528 04/25/23 0741 04/25/23 0900   BP: 143/86 134/80 135/72   Pulse: (!) 114 84 78   Patient Position - Orthostatic VS: Sitting Lying Lying       Physical Exam  Vitals and nursing note reviewed  Constitutional:       General: She is not in acute distress  Appearance: She is well-developed  HENT:      Head: Normocephalic and atraumatic  Mouth/Throat:      Mouth: Mucous membranes are moist    Eyes:      Extraocular Movements: Extraocular movements intact  Conjunctiva/sclera: Conjunctivae normal    Cardiovascular:      Rate and Rhythm: Normal rate and regular rhythm  Heart sounds: Normal heart sounds  No murmur heard  Pulmonary:      Effort: Pulmonary effort is normal  No respiratory distress        Breath sounds: Normal breath sounds  Abdominal:      General: Abdomen is flat  Bowel sounds are normal       Palpations: Abdomen is soft  Tenderness: There is abdominal tenderness in the periumbilical area  Hernia: A hernia is present  Hernia is present in the umbilical area  Musculoskeletal:         General: No swelling  Cervical back: Neck supple  Skin:     General: Skin is warm and dry  Capillary Refill: Capillary refill takes less than 2 seconds  Findings: Erythema present  Comments: Erythema; with discharge around umbilical hernia   Neurological:      General: No focal deficit present  Mental Status: She is alert and oriented to person, place, and time     Psychiatric:         Mood and Affect: Mood normal          Behavior: Behavior normal          ED Medications  Medications   lactated ringers infusion (has no administration in time range)   acetaminophen (TYLENOL) tablet 975 mg (has no administration in time range)   oxyCODONE (ROXICODONE) IR tablet 5 mg (has no administration in time range)   HYDROmorphone (DILAUDID) injection 0 5 mg (has no administration in time range)   ondansetron (ZOFRAN) injection 4 mg (has no administration in time range)   multi-electrolyte (ISOLYTE-S PH 7 4) bolus 500 mL (0 mL Intravenous Stopped 4/25/23 0755)   potassium chloride (K-DUR,KLOR-CON) CR tablet 40 mEq (40 mEq Oral Given 4/25/23 0656)   iohexol (OMNIPAQUE) 350 MG/ML injection (SINGLE-DOSE) 100 mL (100 mL Intravenous Given 4/25/23 0722)   ketorolac (TORADOL) injection 15 mg (15 mg Intravenous Given 4/25/23 0755)   ceftriaxone (ROCEPHIN) 1 g/50 mL in dextrose IVPB (1,000 mg Intravenous New Bag 4/25/23 0755)       Diagnostic Studies  Results Reviewed     Procedure Component Value Units Date/Time    POCT pregnancy, urine [[id number]]  (Normal) Resulted: 04/25/23 0652    Lab Status: Final result Updated: 04/25/23 0652     EXT Preg Test, Ur Negative     Control Valid    Basic metabolic panel [[id number]] (Abnormal) Collected: 04/25/23 0625    Lab Status: Final result Specimen: Blood from Arm, Right Updated: 04/25/23 0650     Sodium 138 mmol/L      Potassium 3 2 mmol/L      Chloride 102 mmol/L      CO2 23 mmol/L      ANION GAP 13 mmol/L      BUN 14 mg/dL      Creatinine 0 70 mg/dL      Glucose 94 mg/dL      Calcium 9 3 mg/dL      eGFR 120 ml/min/1 73sq m     Narrative:      [place] guidelines for Chronic Kidney Disease (CKD):   •  Stage 1 with normal or high GFR (GFR > 90 mL/min/1 73 square meters)  •  Stage 2 Mild CKD (GFR = 60-89 mL/min/1 73 square meters)  •  Stage 3A Moderate CKD (GFR = 45-59 mL/min/1 73 square meters)  •  Stage 3B Moderate CKD (GFR = 30-44 mL/min/1 73 square meters)  •  Stage 4 Severe CKD (GFR = 15-29 mL/min/1 73 square meters)  •  Stage 5 End Stage CKD (GFR <15 mL/min/1 73 square meters)  Note: GFR calculation is accurate only with a steady state creatinine    Lactic acid, plasma (w/reflex if result > 2 0) [[id number]]  (Normal) Collected: 04/25/23 0625    Lab Status: Final result Specimen: Blood from Arm, Right Updated: 04/25/23 0650     LACTIC ACID 0 8 mmol/L     Narrative:      Result may be elevated if tourniquet was used during collection      CBC and differential [[id number]]  (Abnormal) Collected: 04/25/23 0625    Lab Status: Final result Specimen: Blood from Arm, Right Updated: 04/25/23 0634     WBC 12 63 Thousand/uL      RBC 5 23 Million/uL      Hemoglobin 14 6 g/dL      Hematocrit 44 4 %      MCV 85 fL      MCH 27 9 pg      MCHC 32 9 g/dL      RDW 12 6 %      MPV 9 8 fL      Platelets 235 Thousands/uL      nRBC 0 /100 WBCs      Neutrophils Relative 84 %      Immat GRANS % 1 %      Lymphocytes Relative 8 %      Monocytes Relative 7 %      Eosinophils Relative 0 %      Basophils Relative 0 %      Neutrophils Absolute 10 67 Thousands/µL      Immature Grans Absolute 0 06 Thousand/uL      Lymphocytes Absolute 0 96 Thousands/µL      Monocytes Absolute 0 86 Thousand/µL      Eosinophils Absolute 0 04 Thousand/µL      Basophils Absolute 0 04 Thousands/µL                  CT abdomen pelvis with contrast   Final Result by Casandra Garcia MD (04/25 0746)      Narrow neck umbilical hernia containing an abscess measuring 3 x 5 8 x 4 6 cm with adjacent ventral abdominal wall cellulitis  Recommend surgical consultation  The study was marked in [facility] for immediate notification  Workstation performed: [id number]               Procedures  Procedures      ED Course  ED Course as of 04/25/23 0903 Tue Apr 25, 2023   0618 Blood Pressure: 143/86   0618 Temperature: 98 4 °F (36 9 °C)   0618 Temp Source: Oral   0618 Pulse(!): 114   0618 Respirations: 16   0618 SpO2: 98 %  Patient is a 82-year-old female presenting to the emergency department for evaluation of acute on chronic pain surrounding her umbilical hernia patient states that this morning she woke up crying secondary to excruciating pain  Patient also notes that when she started moving around the pain did improve however it is very swollen and is having a foul-smelling discharge surrounding it  Patient with diffusely tender red and swollen umbilical hernia with discharge  It is firm to touch  Immediately put the patient in Trendelenburg with ice on the area to try and spontaneously if incarcerated  We will evaluate patient with CBC, BMP, lactic we will get CT abdomen pelvis with contrast   Patient is aware she has no questions or concerns we will frequently reevaluate  0640 WBC(!): 12 63   0640 Walked patient to bathroom to provide urine sample   0651 LACTIC ACID: 0 8   0651 Potassium(!): 3 2   0654 PREGNANCY TEST URINE: Negative   0740 Patient resting at this time with some pain; will give toradol  Waiting for official read of CT abd pelv   0748    Narrow neck umbilical hernia containing an abscess measuring 3 x 5 8 x 4 6 cm with adjacent ventral abdominal wall cellulitis  Recommend surgical consultation  6935 Started on ceftriaxone  [place] texted surgery   6843 Surgery will come and evaluate patient  Surgery coming from [facility]  Patient aware of all imaging and lab findings  Informed to remain NPO   0902 Patient admitted to surgery to be operated on  SBIRT 22yo+    Flowsheet Row Most Recent Value   Initial Alcohol Screen: US AUDIT-C     1  How often do you have a drink containing alcohol? 1 Filed at: 04/25/2023 0531   2  How many drinks containing alcohol do you have on a typical day you are drinking? 0 Filed at: 04/25/2023 0531   3a  Male UNDER 65: How often do you have five or more drinks on one occasion? 0 Filed at: 04/25/2023 0531   3b  FEMALE Any Age, or MALE 65+: How often do you have 4 or more drinks on one occassion? 0 Filed at: 04/25/2023 0531   Audit-C Score 1 Filed at: 04/25/2023 0886   EJAN: How many times in the past year have you    Used an illegal drug or used a prescription medication for non-medical reasons? Never Filed at: 04/25/2023 0531                MDM      Disposition  Final diagnoses:   Abdominal pain   Umbilical hernia   Cellulitis     Time reflects when diagnosis was documented in both MDM as applicable and the Disposition within this note     Time User Action Codes Description Comment    4/25/2023  8:15 AM Casandra Hair Add [R10 9] Abdominal pain     4/25/2023  8:15 AM Casandra Hair Add [W50 3] Umbilical hernia     4/93/2940  8:15 AM Casandra Hair Add [L03 90] Cellulitis       ED Disposition     ED Disposition   Admit    Condition   Stable    Date/Time   Tue Apr 25, 2023  8:15 AM    Comment   Case was discussed with         Follow-up Information    None         Patient's Medications   Discharge Prescriptions    No medications on file     No discharge procedures on file  PDMP Review     None           ED Provider  Attending physically available and evaluated [address] [place] [address] managed the patient along with the ED Attending      Electronically Signed by         Darby Pillai DO  04/25/23 7919

## 2023-04-25 NOTE — H&P
H&P Exam - General Surgery   Diego Mcnally 24 y o  female MRN: [id number]  Unit/Bed#: ED-15 Encounter: [id number]    Assessment/Plan     Assessment:  54K with umbilical hernia, likely w incarcerated omentum given overlying abscess and cellulitis  Plan:  - OR today for primary umbilical hernia repair with incision and drainage/washout of abscess; likely d/c home today vs tomorrow  - NPO until after OR  - IVF hydration  - PRN analgesia/antiemetics    I personally discussed proceeding to the operating room for this procedure as well as the risks, benefits, and alternatives  We discussed that given the infected field we will not be able to utilize mesh for hernia repair  This puts her at high risk for hernia recurrence in the future  We additionally discussed that while we will attempt to maintain cosmesis, there is the remote possibility that we may have to debride skin or amputate umbilical stalk  Patient expressed understanding and still desired to proceed  Procedure consent form was signed by both she and I at that time  Kay Love MD  [id number] General Surgery Chief Resident        History of Present Illness     HPI:  Diego Mcnally is a 24 y o  female who presents with umbilical hernia and associated abscess/cellulitis  She states she first noticed the defect 12 days ago, but didn't have significant discomfort or overlying skin changes until several days ago  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  She also lost approximately 80lbs over the past 6 months  She describes anorexia but denies other associated symptoms including nausea/vomiting, fever/chills, chest pain, shortness of breath, changes in bowel habits, constipation/diarrhea, dysuria  She denies any past surgical/medical history  She did previously undergo anesthesia during wisdom teeth removal      Review of Systems   Constitutional: Positive for appetite change  Negative for chills and fever     HENT: Negative for ear "pain and sore throat  Eyes: Negative for pain and visual disturbance  Respiratory: Negative for cough and shortness of breath  Cardiovascular: Negative for chest pain and palpitations  Gastrointestinal: Positive for abdominal pain  Negative for constipation, diarrhea, nausea and vomiting  Genitourinary: Negative for dysuria and hematuria  Musculoskeletal: Negative for arthralgias and back pain  Skin: Negative for color change and rash  Neurological: Negative for seizures and syncope  All other systems reviewed and are negative  Historical Information   History reviewed  No pertinent past medical history  History reviewed  No pertinent surgical history    Social History   Social History     Substance and Sexual Activity   Alcohol Use Yes    Comment: occasionally     Social History     Substance and Sexual Activity   Drug Use No     Social History     Tobacco Use   Smoking Status Never   • Passive exposure: Never   Smokeless Tobacco Never     E-Cigarette/Vaping   • E-Cigarette Use Current Some Day User      E-Cigarette/Vaping Substances     Family History: non-contributory    Meds/Allergies   all medications and allergies reviewed  No Known Allergies    Objective   First Vitals:   Blood Pressure: 143/86 (04/25/23 0528)  Pulse: (!) 114 (04/25/23 0528)  Temperature: 98 4 °F (36 9 °C) (04/25/23 0528)  Temp Source: Oral (04/25/23 0528)  Respirations: 16 (04/25/23 0528)  Height: 5' 5\" (165 1 cm) (04/25/23 0528)  Weight - Scale: 87 8 kg (193 lb 9 oz) (04/25/23 0528)  SpO2: 98 % (04/25/23 0528)    Current Vitals:   Blood Pressure: 134/80 (04/25/23 0741)  Pulse: 84 (04/25/23 0741)  Temperature: 98 4 °F (36 9 °C) (04/25/23 0528)  Temp Source: Oral (04/25/23 0528)  Respirations: 16 (04/25/23 0741)  Height: 5' 5\" (165 1 cm) (04/25/23 0528)  Weight - Scale: 87 8 kg (193 lb 9 oz) (04/25/23 0528)  SpO2: 98 % (04/25/23 0741)      Intake/Output Summary (Last 24 hours) at 4/25/2023 0847  Last data filed at " 4/25/2023 0755  Gross per 24 hour   Intake 500 ml   Output --   Net 500 ml       Invasive Devices     Peripheral Intravenous Line  Duration           Peripheral IV 04/25/23 Right Antecubital <1 day                Physical Exam  Vitals and nursing note reviewed  Constitutional:       General: She is not in acute distress  Appearance: She is well-developed  HENT:      Head: Normocephalic and atraumatic  Eyes:      Conjunctiva/sclera: Conjunctivae normal    Cardiovascular:      Rate and Rhythm: Normal rate and regular rhythm  Heart sounds: No murmur heard  Pulmonary:      Effort: Pulmonary effort is normal  No respiratory distress  Breath sounds: Normal breath sounds  Abdominal:      General: There is no distension  Palpations: Abdomen is soft  Tenderness: There is abdominal tenderness  There is no guarding or rebound  Hernia: A hernia is present  Comments: Fullness and tenderness about umbilicus; unable to palpate hernia defect secondary to this  Mild cellulitis of the umbilicus    Musculoskeletal:         General: No swelling  Cervical back: Neck supple  Skin:     General: Skin is warm and dry  Capillary Refill: Capillary refill takes less than 2 seconds  Neurological:      Mental Status: She is alert and oriented to person, place, and time  Cranial Nerves: No cranial nerve deficit     Psychiatric:         Mood and Affect: Mood normal          Lab Results:   CBC:   Lab Results   Component Value Date    WBC 12 63 (H) 04/25/2023    HGB 14 6 04/25/2023    HCT 44 4 04/25/2023    MCV 85 04/25/2023     04/25/2023    MCH 27 9 04/25/2023    MCHC 32 9 04/25/2023    RDW 12 6 04/25/2023    MPV 9 8 04/25/2023    NRBC 0 04/25/2023   , CMP:   Lab Results   Component Value Date    SODIUM 138 04/25/2023    K 3 2 (L) 04/25/2023     04/25/2023    CO2 23 04/25/2023    BUN 14 04/25/2023    CREATININE 0 70 04/25/2023    CALCIUM 9 3 04/25/2023    EGFR 120 04/25/2023 Imaging: I have personally reviewed pertinent reports  CT abdomen pelvis with contrast  Narrative: CT ABDOMEN AND PELVIS WITH IV CONTRAST    INDICATION:   r/o incarcerated hernia  COMPARISON:  None  TECHNIQUE:  CT examination of the abdomen and pelvis was performed  Multiplanar 2D reformatted images were created from the source data  Radiation dose length product (DLP) for this visit:  601 mGy-cm   This examination, like all CT scans performed in the [facility], was performed utilizing techniques to minimize radiation dose exposure, including the use of iterative   reconstruction and automated exposure control  IV Contrast:  100 mL of iohexol (OMNIPAQUE)  Enteric Contrast:  Enteric contrast was not administered  FINDINGS:    ABDOMEN    LOWER CHEST:  Unremarkable    LIVER/BILIARY TREE:  Unremarkable  GALLBLADDER:  No calcified gallstones  No pericholecystic inflammatory change  SPLEEN:  Unremarkable  PANCREAS:  Unremarkable  ADRENAL GLANDS:  Unremarkable  KIDNEYS/URETERS:  Unremarkable  No hydronephrosis  STOMACH AND BOWEL:  Unremarkable  APPENDIX:  No findings to suggest appendicitis  ABDOMINOPELVIC CAVITY:  No ascites  No pneumoperitoneum  No lymphadenopathy  VESSELS:  Unremarkable for patient's age  PELVIS    REPRODUCTIVE ORGANS:  Intrauterine device is present  URINARY BLADDER:  Mainly decompressed and appears grossly unremarkable  ABDOMINAL WALL/INGUINAL REGIONS:  There is a rim-enhancing fluid collection within the narrow neck umbilical hernia sac measuring 3 x 5 8 x 4 6 cm  There is associated adjacent fat stranding  OSSEOUS STRUCTURES:  No acute fracture or destructive osseous lesion  Impression: Narrow neck umbilical hernia containing an abscess measuring 3 x 5 8 x 4 6 cm with adjacent ventral abdominal wall cellulitis  Recommend surgical consultation  The study was marked in [facility] for immediate notification  Workstation performed: [id number]      EKG, Pathology, and Other Studies: I have personally reviewed pertinent reports  Code Status: Level 1 - Full Code  Advance Directive and Living Will:      Power of :    POLST:      Counseling / Coordination of Care  Total floor / unit time spent today 25 minutes  Greater than 50% of total time was spent with the patient and / or family counseling and / or coordination of care  A description of the counseling / coordination of care: discussion with patient regarding OR timing and details

## 2023-04-25 NOTE — ANESTHESIA PREPROCEDURE EVALUATION
Procedure:  REPAIR HERNIA UMBILICAL, incision and drainage, washout (Abdomen)    Relevant Problems   ANESTHESIA (within normal limits)      CARDIO (within normal limits)      GI/HEPATIC  hernia      PULMONARY (within normal limits)        Physical Exam    Airway  Comment: Nasal septum piercing  Mallampati score: II  TM Distance: >3 FB  Neck ROM: full     Dental   No notable dental hx     Cardiovascular  Cardiovascular exam normal    Pulmonary  Pulmonary exam normal     Other Findings        Anesthesia Plan  ASA Score- 2 Emergent    Anesthesia Type- general with ASA Monitors  Additional Monitors:   Airway Plan: ETT  Plan Factors-    Chart reviewed  Existing labs reviewed  Patient summary reviewed  Induction- intravenous  Postoperative Plan-     Informed Consent- Anesthetic plan and risks discussed with patient

## 2023-04-25 NOTE — LETTER
[id number]  [address]  Dept: [phone]    April 27, 0576     Patient: Pepito Pinto   YOB: 1995   Date of Visit: 4/25/2023       To Whom it May Concern:    Erasmo Gay is under my professional care  She was seen in the hospital from 4/25/2023 to 04/27/23  She may return to work on May 1st with the following limitations light duty, no heavy lifting greater than 15 pounds   If you have any questions or concerns, please don't hesitate to call           Sincerely,          Ashley Ordonez, DO

## 2023-04-26 LAB
ANION GAP SERPL CALCULATED.3IONS-SCNC: 8 MMOL/L (ref 4–13)
BASOPHILS # BLD AUTO: 0.02 THOUSANDS/ÂΜL (ref 0–0.1)
BASOPHILS NFR BLD AUTO: 0 % (ref 0–1)
BUN SERPL-MCNC: 10 MG/DL (ref 5–25)
CALCIUM SERPL-MCNC: 9.2 MG/DL (ref 8.4–10.2)
CHLORIDE SERPL-SCNC: 104 MMOL/L (ref 96–108)
CO2 SERPL-SCNC: 25 MMOL/L (ref 21–32)
CREAT SERPL-MCNC: 0.61 MG/DL (ref 0.6–1.3)
EOSINOPHIL # BLD AUTO: 0.04 THOUSAND/ÂΜL (ref 0–0.61)
EOSINOPHIL NFR BLD AUTO: 1 % (ref 0–6)
ERYTHROCYTE [DISTWIDTH] IN BLOOD BY AUTOMATED COUNT: 12.7 % (ref 11.6–15.1)
GFR SERPL CREATININE-BSD FRML MDRD: 126 ML/MIN/1.73SQ M
GLUCOSE SERPL-MCNC: 134 MG/DL (ref 65–140)
HCT VFR BLD AUTO: 38.8 % (ref 34.8–46.1)
HGB BLD-MCNC: 12.9 G/DL (ref 11.5–15.4)
IMM GRANULOCYTES # BLD AUTO: 0.03 THOUSAND/UL (ref 0–0.2)
IMM GRANULOCYTES NFR BLD AUTO: 0 % (ref 0–2)
LYMPHOCYTES # BLD AUTO: 1.07 THOUSANDS/ÂΜL (ref 0.6–4.47)
LYMPHOCYTES NFR BLD AUTO: 12 % (ref 14–44)
MCH RBC QN AUTO: 28 PG (ref 26.8–34.3)
MCHC RBC AUTO-ENTMCNC: 33.2 G/DL (ref 31.4–37.4)
MCV RBC AUTO: 84 FL (ref 82–98)
MONOCYTES # BLD AUTO: 0.43 THOUSAND/ÂΜL (ref 0.17–1.22)
MONOCYTES NFR BLD AUTO: 5 % (ref 4–12)
NEUTROPHILS # BLD AUTO: 7.15 THOUSANDS/ÂΜL (ref 1.85–7.62)
NEUTS SEG NFR BLD AUTO: 82 % (ref 43–75)
NRBC BLD AUTO-RTO: 0 /100 WBCS
PLATELET # BLD AUTO: 228 THOUSANDS/UL (ref 149–390)
PMV BLD AUTO: 9.6 FL (ref 8.9–12.7)
POTASSIUM SERPL-SCNC: 3.4 MMOL/L (ref 3.5–5.3)
RBC # BLD AUTO: 4.61 MILLION/UL (ref 3.81–5.12)
SODIUM SERPL-SCNC: 137 MMOL/L (ref 135–147)
WBC # BLD AUTO: 8.74 THOUSAND/UL (ref 4.31–10.16)

## 2023-04-26 PROCEDURE — 99024 POSTOP FOLLOW-UP VISIT: CPT | Performed by: SURGERY

## 2023-04-26 PROCEDURE — 80048 BASIC METABOLIC PNL TOTAL CA: CPT | Performed by: SURGERY

## 2023-04-26 PROCEDURE — 85025 COMPLETE CBC W/AUTO DIFF WBC: CPT | Performed by: SURGERY

## 2023-04-26 RX ORDER — POTASSIUM CHLORIDE 20 MEQ/1
40 TABLET, EXTENDED RELEASE ORAL ONCE
Status: COMPLETED | OUTPATIENT
Start: 2023-04-26 | End: 2023-04-26

## 2023-04-26 RX ORDER — SIMETHICONE 80 MG
80 TABLET,CHEWABLE ORAL EVERY 6 HOURS PRN
Status: DISCONTINUED | OUTPATIENT
Start: 2023-04-26 | End: 2023-04-27 | Stop reason: HOSPADM

## 2023-04-26 RX ORDER — HYDROXYZINE HYDROCHLORIDE 25 MG/1
25 TABLET, FILM COATED ORAL EVERY 6 HOURS PRN
Status: DISCONTINUED | OUTPATIENT
Start: 2023-04-26 | End: 2023-04-27 | Stop reason: HOSPADM

## 2023-04-26 RX ADMIN — ACETAMINOPHEN 325MG 975 MG: 325 TABLET ORAL at 21:32

## 2023-04-26 RX ADMIN — HYDROMORPHONE HYDROCHLORIDE 0.5 MG: 1 INJECTION, SOLUTION INTRAMUSCULAR; INTRAVENOUS; SUBCUTANEOUS at 10:10

## 2023-04-26 RX ADMIN — CEFAZOLIN SODIUM 2000 MG: 2 SOLUTION INTRAVENOUS at 05:11

## 2023-04-26 RX ADMIN — HYDROXYZINE HYDROCHLORIDE 25 MG: 25 TABLET, FILM COATED ORAL at 21:33

## 2023-04-26 RX ADMIN — METRONIDAZOLE 500 MG: 500 INJECTION, SOLUTION INTRAVENOUS at 15:09

## 2023-04-26 RX ADMIN — SIMETHICONE 80 MG: 80 TABLET, CHEWABLE ORAL at 09:14

## 2023-04-26 RX ADMIN — ONDANSETRON 4 MG: 2 INJECTION INTRAMUSCULAR; INTRAVENOUS at 10:09

## 2023-04-26 RX ADMIN — SODIUM CHLORIDE, SODIUM LACTATE, POTASSIUM CHLORIDE, AND CALCIUM CHLORIDE 75 ML/HR: .6; .31; .03; .02 INJECTION, SOLUTION INTRAVENOUS at 05:10

## 2023-04-26 RX ADMIN — CEFAZOLIN SODIUM 2000 MG: 2 SOLUTION INTRAVENOUS at 14:26

## 2023-04-26 RX ADMIN — CEFAZOLIN SODIUM 2000 MG: 2 SOLUTION INTRAVENOUS at 21:34

## 2023-04-26 RX ADMIN — HEPARIN SODIUM 5000 UNITS: 5000 INJECTION INTRAVENOUS; SUBCUTANEOUS at 21:35

## 2023-04-26 RX ADMIN — POTASSIUM CHLORIDE 40 MEQ: 1500 TABLET, EXTENDED RELEASE ORAL at 11:35

## 2023-04-26 RX ADMIN — HEPARIN SODIUM 5000 UNITS: 5000 INJECTION INTRAVENOUS; SUBCUTANEOUS at 14:26

## 2023-04-26 RX ADMIN — HEPARIN SODIUM 5000 UNITS: 5000 INJECTION INTRAVENOUS; SUBCUTANEOUS at 06:07

## 2023-04-26 RX ADMIN — OXYCODONE 5 MG: 5 TABLET ORAL at 03:14

## 2023-04-26 RX ADMIN — METRONIDAZOLE 500 MG: 500 INJECTION, SOLUTION INTRAVENOUS at 22:17

## 2023-04-26 RX ADMIN — METRONIDAZOLE 500 MG: 500 INJECTION, SOLUTION INTRAVENOUS at 06:07

## 2023-04-26 RX ADMIN — ACETAMINOPHEN 325MG 975 MG: 325 TABLET ORAL at 06:07

## 2023-04-26 RX ADMIN — ACETAMINOPHEN 325MG 975 MG: 325 TABLET ORAL at 14:26

## 2023-04-26 NOTE — PLAN OF CARE
Problem: PAIN - ADULT  Goal: Verbalizes/displays adequate comfort level or baseline comfort level  Description: Interventions:  - Encourage patient to monitor pain and request assistance  - Assess pain using appropriate pain scale  - Administer analgesics based on type and severity of pain and evaluate response  - Implement non-pharmacological measures as appropriate and evaluate response  - Consider cultural and social influences on pain and pain management  - Notify physician/advanced practitioner if interventions unsuccessful or patient reports new pain  Outcome: Progressing     Problem: INFECTION - ADULT  Goal: Absence or prevention of progression during hospitalization  Description: INTERVENTIONS:  - Assess and monitor for signs and symptoms of infection  - Monitor lab/diagnostic results  - Monitor all insertion sites, i e  indwelling lines, tubes, and drains  - Monitor endotracheal if appropriate and nasal secretions for changes in amount and color  - [place] appropriate cooling/warming therapies per order  - Administer medications as ordered  - Instruct and encourage patient and family to use good hand hygiene technique  - Identify and instruct in appropriate isolation precautions for identified infection/condition  Outcome: Progressing  Goal: Absence of fever/infection during neutropenic period  Description: INTERVENTIONS:  - Monitor WBC     Outcome: Progressing     Problem: SAFETY ADULT  Goal: Patient will remain free of falls  Description: INTERVENTIONS:  - Educate patient/family on patient safety including physical limitations  - Instruct patient to call for assistance with activity   - Consult OT/PT to assist with strengthening/mobility   - Keep Call bell within reach  - Keep bed low and locked with side rails adjusted as appropriate  - Keep care items and personal belongings within reach  - Initiate and maintain comfort rounds  - Make Fall Risk Sign visible to staff  - Offer Toileting every 2 Hours, in advance of need  - Initiate/Maintain bed alarm, if needed  - Obtain necessary fall risk management equipment  - Apply yellow socks and bracelet for high fall risk patients  - Consider moving patient to room near nurses station  Outcome: Progressing  Goal: Maintain or return to baseline ADL function  Description: INTERVENTIONS:  -  Assess patient's ability to carry out ADLs; assess patient's baseline for ADL function and identify physical deficits which impact ability to perform ADLs (bathing, care of mouth/teeth, toileting, grooming, dressing, etc )  - Assess/evaluate cause of self-care deficits   - Assess range of motion  - Assess patient's mobility; develop plan if impaired  - Assess patient's need for assistive devices and provide as appropriate  - Encourage maximum independence but intervene and supervise when necessary  - Involve family in performance of ADLs  - Assess for home care needs following discharge   - Consider OT consult to assist with ADL evaluation and planning for discharge  - Provide patient education as appropriate  Outcome: Progressing  Goal: Maintains/Returns to pre admission functional level  Description: INTERVENTIONS:  - Perform BMAT or MOVE assessment daily    - Set and communicate daily mobility goal to care team and patient/family/caregiver     - Collaborate with rehabilitation services on mobility goals if consulted  - Ambulate patient   - Out of bed to chair   - Out of bed for meals   - Out of bed for toileting  - Record patient progress and toleration of activity level   Outcome: Progressing     Problem: DISCHARGE PLANNING  Goal: Discharge to home or other facility with appropriate resources  Description: INTERVENTIONS:  - Identify barriers to discharge w/patient and caregiver  - Arrange for needed discharge resources and transportation as appropriate  - Identify discharge learning needs (meds, wound care, etc )  - Arrange for interpretive services to assist at discharge as needed  - Refer to Case Management Department for coordinating discharge planning if the patient needs post-hospital services based on physician/advanced practitioner order or complex needs related to functional status, cognitive ability, or social support system  Outcome: Progressing     Problem: Knowledge Deficit  Goal: Patient/family/caregiver demonstrates understanding of disease process, treatment plan, medications, and discharge instructions  Description: Complete learning assessment and assess knowledge base    Interventions:  - Provide teaching at level of understanding  - Provide teaching via preferred learning methods  Outcome: Progressing

## 2023-04-26 NOTE — PLAN OF CARE
Problem: PAIN - ADULT  Goal: Verbalizes/displays adequate comfort level or baseline comfort level  Description: Interventions:  - Encourage patient to monitor pain and request assistance  - Assess pain using appropriate pain scale  - Administer analgesics based on type and severity of pain and evaluate response  - Implement non-pharmacological measures as appropriate and evaluate response  - Consider cultural and social influences on pain and pain management  - Notify physician/advanced practitioner if interventions unsuccessful or patient reports new pain  4/26/2023 0348 by Collins Mcmanus RN  Outcome: Progressing  4/26/2023 0348 by Collins Mcmanus RN  Outcome: Progressing     Problem: INFECTION - ADULT  Goal: Absence or prevention of progression during hospitalization  Description: INTERVENTIONS:  - Assess and monitor for signs and symptoms of infection  - Monitor lab/diagnostic results  - Monitor all insertion sites, i e  indwelling lines, tubes, and drains  - Monitor endotracheal if appropriate and nasal secretions for changes in amount and color  - [place] appropriate cooling/warming therapies per order  - Administer medications as ordered  - Instruct and encourage patient and family to use good hand hygiene technique  - Identify and instruct in appropriate isolation precautions for identified infection/condition  4/26/2023 0348 by Collins Mcmanus RN  Outcome: Progressing  4/26/2023 0348 by Collins Mcmanus RN  Outcome: Progressing     Problem: SAFETY ADULT  Goal: Patient will remain free of falls  Description: INTERVENTIONS:  - Educate patient/family on patient safety including physical limitations  - Instruct patient to call for assistance with activity   - Consult OT/PT to assist with strengthening/mobility   - Keep Call bell within reach  - Keep bed low and locked with side rails adjusted as appropriate  - Keep care items and personal belongings within reach  - Initiate and maintain comfort rounds  - Make Fall Risk Sign visible to staff  - Offer Toileting every 2 Hours, in advance of need  - Initiate/Maintain bed alarm, if needed  - Obtain necessary fall risk management equipment  - Apply yellow socks and bracelet for high fall risk patients  - Consider moving patient to room near nurses station  4/26/2023 0348 by Abraham Joshi RN  Outcome: Progressing  4/26/2023 0348 by Abraham Joshi RN  Outcome: Progressing  Goal: Maintain or return to baseline ADL function  Description: INTERVENTIONS:  -  Assess patient's ability to carry out ADLs; assess patient's baseline for ADL function and identify physical deficits which impact ability to perform ADLs (bathing, care of mouth/teeth, toileting, grooming, dressing, etc )  - Assess/evaluate cause of self-care deficits   - Assess range of motion  - Assess patient's mobility; develop plan if impaired  - Assess patient's need for assistive devices and provide as appropriate  - Encourage maximum independence but intervene and supervise when necessary  - Involve family in performance of ADLs  - Assess for home care needs following discharge   - Consider OT consult to assist with ADL evaluation and planning for discharge  - Provide patient education as appropriate  4/26/2023 0348 by Abraham Joshi RN  Outcome: Progressing  4/26/2023 0348 by Abraham Joshi RN  Outcome: Progressing  Goal: Maintains/Returns to pre admission functional level  Description: INTERVENTIONS:  - Perform BMAT or MOVE assessment daily    - Set and communicate daily mobility goal to care team and patient/family/caregiver     - Collaborate with rehabilitation services on mobility goals if consulted  - Ambulate patient   - Out of bed to chair   - Out of bed for meals   - Out of bed for toileting  - Record patient progress and toleration of activity level   4/26/2023 0348 by Abraham Joshi RN  Outcome: Progressing  4/26/2023 0348 by Abraham Joshi RN  Outcome: Progressing     Problem: DISCHARGE PLANNING  Goal: Discharge to home or other facility with appropriate resources  Description: INTERVENTIONS:  - Identify barriers to discharge w/patient and caregiver  - Arrange for needed discharge resources and transportation as appropriate  - Identify discharge learning needs (meds, wound care, etc )  - Arrange for interpretive services to assist at discharge as needed  - Refer to Case Management Department for coordinating discharge planning if the patient needs post-hospital services based on physician/advanced practitioner order or complex needs related to functional status, cognitive ability, or social support system  4/26/2023 0348 by Edwardo Lopez RN  Outcome: Progressing  4/26/2023 0348 by Edwardo Lopez RN  Outcome: Progressing     Problem: Knowledge Deficit  Goal: Patient/family/caregiver demonstrates understanding of disease process, treatment plan, medications, and discharge instructions  Description: Complete learning assessment and assess knowledge base    Interventions:  - Provide teaching at level of understanding  - Provide teaching via preferred learning methods  4/26/2023 0348 by Edwardo Lopez RN  Outcome: Progressing  4/26/2023 0348 by Edwardo Lopez RN  Outcome: Progressing

## 2023-04-26 NOTE — PROGRESS NOTES
"Progress Note - General Surgery   [address] [place] [address] 24 y o  female MRN: [id number]  Unit/Bed#: [place] [address] -02 Encounter: [id number]    Assessment:  24 F p/w umbilical abscess s/p incision/drainage and washout on 4/25  Now with c/f urachal remnant as origin  VS: AVSS, room air   UOP: multiple voids      AM Labs: pending    OR Cx: GPcs, GNRs        Plan:  -diet as tolerated   -pain/nausea control   -continue abx, follow-up Or culture data  And tailor as appropriate   -OOB, ambulate  -daily dressing/packing changes  -pulmonary toilet  -disposition planning     Subjective/Objective       Subjective: moderate pain, vomiting this AM, moderate drainage on dressing     Objective:     Blood pressure 130/84, pulse 80, temperature 98 4 °F (36 9 °C), temperature source Oral, resp  rate 18, height 5' 5\" (1 651 m), weight 87 8 kg (193 lb 9 oz), SpO2 97 %, not currently breastfeeding  ,Body mass index is 32 21 kg/m²  Intake/Output Summary (Last 24 hours) at 4/26/2023 0850  Last data filed at 4/25/2023 1519  Gross per 24 hour   Intake 725 ml   Output --   Net 725 ml       Invasive Devices     Peripheral Intravenous Line  Duration           Peripheral IV 04/25/23 Left;Proximal;Ventral (anterior) Forearm <1 day                Physical Exam:    General: NAD  HEENT: normocephalic, atraumatic   Cardiovascular: RRR  Respiratory: No distress  Abdomen: soft, moderate periumbilical tenderness,s non-distended, dressing c/d/i   Extremities: No c/c/e  Neuro: AAOx3  Skin: warm, dry     Lab, Imaging and other studies:I have personally reviewed pertinent lab results    , CBC: No results found for: WBC, HGB, HCT, MCV, PLT, ADJUSTEDWBC, MCH, MCHC, RDW, MPV, NRBC, CMP: No results found for: SODIUM, K, CL, CO2, ANIONGAP, BUN, CREATININE, GLUCOSE, CALCIUM, AST, ALT, ALKPHOS, PROT, BILITOT, EGFR, Coagulation: No results found for: PT, INR, APTT  VTE Pharmacologic Prophylaxis: Heparin  VTE Mechanical Prophylaxis: sequential compression " device

## 2023-04-27 ENCOUNTER — HOME HEALTH ADMISSION (OUTPATIENT)
Dept: HOME HEALTH SERVICES | Facility: HOME HEALTHCARE | Age: 28
End: 2023-04-27

## 2023-04-27 ENCOUNTER — TELEPHONE (OUTPATIENT)
Dept: UROLOGY | Facility: AMBULATORY SURGERY CENTER | Age: 28
End: 2023-04-27

## 2023-04-27 VITALS
DIASTOLIC BLOOD PRESSURE: 80 MMHG | HEIGHT: 65 IN | WEIGHT: 193.56 LBS | OXYGEN SATURATION: 95 % | SYSTOLIC BLOOD PRESSURE: 126 MMHG | TEMPERATURE: 97.6 F | BODY MASS INDEX: 32.25 KG/M2 | RESPIRATION RATE: 16 BRPM | HEART RATE: 80 BPM

## 2023-04-27 PROBLEM — L02.216 ABSCESS, UMBILICAL: Status: ACTIVE | Noted: 2023-04-27

## 2023-04-27 LAB
BACTERIA SPEC ANAEROBE CULT: ABNORMAL
BACTERIA SPEC ANAEROBE CULT: ABNORMAL
BASOPHILS # BLD AUTO: 0.03 THOUSANDS/ÂΜL (ref 0–0.1)
BASOPHILS NFR BLD AUTO: 1 % (ref 0–1)
EOSINOPHIL # BLD AUTO: 0.1 THOUSAND/ÂΜL (ref 0–0.61)
EOSINOPHIL NFR BLD AUTO: 2 % (ref 0–6)
ERYTHROCYTE [DISTWIDTH] IN BLOOD BY AUTOMATED COUNT: 12.9 % (ref 11.6–15.1)
HCT VFR BLD AUTO: 38.5 % (ref 34.8–46.1)
HGB BLD-MCNC: 12.7 G/DL (ref 11.5–15.4)
IMM GRANULOCYTES # BLD AUTO: 0.02 THOUSAND/UL (ref 0–0.2)
IMM GRANULOCYTES NFR BLD AUTO: 0 % (ref 0–2)
LYMPHOCYTES # BLD AUTO: 1.43 THOUSANDS/ÂΜL (ref 0.6–4.47)
LYMPHOCYTES NFR BLD AUTO: 25 % (ref 14–44)
MCH RBC QN AUTO: 28 PG (ref 26.8–34.3)
MCHC RBC AUTO-ENTMCNC: 33 G/DL (ref 31.4–37.4)
MCV RBC AUTO: 85 FL (ref 82–98)
MONOCYTES # BLD AUTO: 0.34 THOUSAND/ÂΜL (ref 0.17–1.22)
MONOCYTES NFR BLD AUTO: 6 % (ref 4–12)
NEUTROPHILS # BLD AUTO: 3.88 THOUSANDS/ÂΜL (ref 1.85–7.62)
NEUTS SEG NFR BLD AUTO: 66 % (ref 43–75)
NRBC BLD AUTO-RTO: 0 /100 WBCS
PLATELET # BLD AUTO: 265 THOUSANDS/UL (ref 149–390)
PMV BLD AUTO: 9.8 FL (ref 8.9–12.7)
RBC # BLD AUTO: 4.54 MILLION/UL (ref 3.81–5.12)
WBC # BLD AUTO: 5.8 THOUSAND/UL (ref 4.31–10.16)

## 2023-04-27 PROCEDURE — NC001 PR NO CHARGE: Performed by: SURGERY

## 2023-04-27 PROCEDURE — 99024 POSTOP FOLLOW-UP VISIT: CPT | Performed by: SURGERY

## 2023-04-27 PROCEDURE — 85025 COMPLETE CBC W/AUTO DIFF WBC: CPT

## 2023-04-27 RX ORDER — AMOXICILLIN AND CLAVULANATE POTASSIUM 875; 125 MG/1; MG/1
1 TABLET, FILM COATED ORAL EVERY 12 HOURS SCHEDULED
Qty: 6 TABLET | Refills: 0 | Status: SHIPPED | OUTPATIENT
Start: 2023-04-27 | End: 2023-04-30

## 2023-04-27 RX ORDER — LIDOCAINE HYDROCHLORIDE 10 MG/ML
10 INJECTION, SOLUTION EPIDURAL; INFILTRATION; INTRACAUDAL; PERINEURAL ONCE
Status: COMPLETED | OUTPATIENT
Start: 2023-04-27 | End: 2023-04-27

## 2023-04-27 RX ADMIN — OXYCODONE HYDROCHLORIDE 10 MG: 10 TABLET ORAL at 08:50

## 2023-04-27 RX ADMIN — METRONIDAZOLE 500 MG: 500 INJECTION, SOLUTION INTRAVENOUS at 06:13

## 2023-04-27 RX ADMIN — ACETAMINOPHEN 325MG 975 MG: 325 TABLET ORAL at 13:10

## 2023-04-27 RX ADMIN — HYDROMORPHONE HYDROCHLORIDE 0.5 MG: 1 INJECTION, SOLUTION INTRAMUSCULAR; INTRAVENOUS; SUBCUTANEOUS at 10:02

## 2023-04-27 RX ADMIN — HEPARIN SODIUM 5000 UNITS: 5000 INJECTION INTRAVENOUS; SUBCUTANEOUS at 05:18

## 2023-04-27 RX ADMIN — HYDROXYZINE HYDROCHLORIDE 25 MG: 25 TABLET, FILM COATED ORAL at 08:58

## 2023-04-27 RX ADMIN — LIDOCAINE HYDROCHLORIDE 10 ML: 10 INJECTION, SOLUTION EPIDURAL; INFILTRATION; INTRACAUDAL; PERINEURAL at 10:42

## 2023-04-27 RX ADMIN — METRONIDAZOLE 500 MG: 500 INJECTION, SOLUTION INTRAVENOUS at 13:09

## 2023-04-27 RX ADMIN — ACETAMINOPHEN 325MG 975 MG: 325 TABLET ORAL at 05:17

## 2023-04-27 RX ADMIN — CEFAZOLIN SODIUM 2000 MG: 2 SOLUTION INTRAVENOUS at 12:01

## 2023-04-27 RX ADMIN — CEFAZOLIN SODIUM 2000 MG: 2 SOLUTION INTRAVENOUS at 05:19

## 2023-04-27 RX ADMIN — HEPARIN SODIUM 5000 UNITS: 5000 INJECTION INTRAVENOUS; SUBCUTANEOUS at 13:10

## 2023-04-27 NOTE — DISCHARGE SUMMARY
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  She also lost approximately 80lbs over the past 6 months  She describes anorexia but denies other associated symptoms including nausea/vomiting, fever/chills, chest pain, shortness of breath, changes in bowel habits, constipation/diarrhea, dysuria  She denies any past surgical/medical history  She did previously undergo anesthesia during wisdom teeth removal      Procedures Performed: 4/25 EXCISIONAL DEBRIDEMENT UMBILICAL abscess     Summary of Hospital Course: ER workup revealed Narrow neck umbilical hernia containing an abscess measuring 3 x 5 8 x 4 6 cm with adjacent ventral abdominal wall cellulitis  General surgery was consulted for evaluation management  Patient was taken to the operating room for excisional debridement of umbilical abscess  Intraoperative findings included abscess cavity with extensive purulence drainage and cavity of necrotic and nonviable tissue which was debrided  The umbilical stalk was divided to access the cavity and no entrance intra-abdominal he was appreciated  Patient was admitted postoperatively and she was maintained on IV antibiotics  Her diet was advanced  Her packing was changed daily with improvement in her wound  On postop day 2, 1 nylon stitch was placed in the midline of the incision and repacked  Case management was engaged for [facility] home wound care  She was deemed stable for discharge home on 3 additional days of antibiotics  Significant Findings, Care, Treatment and Services Provided: 4/25 CTAP: Narrow neck umbilical hernia containing an abscess measuring 3 x 5 8 x 4 6 cm with adjacent ventral abdominal wall cellulitis    Complications: None    Discharge Diagnosis: Umbilical abscess    Medical Problems     Resolved Problems  Date Reviewed: 4/20/2023   None         Condition at Discharge: good         Discharge instructions/Information to patient and family:   See after visit summary for information provided to patient and family  Provisions for Follow-Up Care:  See after visit summary for information related to follow-up care and any pertinent home health orders  PCP: EVIN Shukla    Disposition: See After Visit Summary for discharge disposition information  Planned Readmission: No      Discharge Statement   I spent 20 minutes discharging the patient  This time was spent on the day of discharge  I had direct contact with the patient on the day of discharge  Additional documentation is required if more than 30 minutes were spent on discharge  Discharge Medications:  See after visit summary for reconciled discharge medications provided to patient and family

## 2023-04-27 NOTE — CASE MANAGEMENT
Case Management Discharge Planning Note    Patient name Los Dalton  Location [place] 2 /[place] 2 Lalito Bermeo* MRN [id number]  : 1995 Date 2023       Current Admission Date: 2023  Current Admission Diagnosis:Abscess, umbilical   Patient Active Problem List    Diagnosis Date Noted   • Abscess, umbilical    • Encounter for surveillance of contraceptive pills 2019   • Encounter for counseling regarding contraception 2019      LOS (days): 0  Geometric Mean LOS (GMLOS) (days):   Days to GMLOS:     OBJECTIVE:            Current admission status: Outpatient Surgery   Preferred Pharmacy:   [facility] [address] [place] [address], [place] - [address] [facility]  [address] [address]  Phone: [phone] Fax: [phone] [facility] [place] [id number] [facility] [address]  [address] [place] [address]  Phone: [phone] Fax: [phone]    [facility] [address] [address]  Phone: [phone] Fax: [phone]    [facility] [place] [address] [facility] ,  [facility] [address] [facility] [address]  Phone: [phone] Fax: [phone]    Primary Care Provider: Tula Mcburney, CRNP    Primary Insurance:   Secondary Insurance:     DISCHARGE DETAILS:    Discharge planning discussed with[de-identified] patient  Freedom of Choice: Yes  Comments - Freedom of Choice: prefers [facility] for wound care > referral sent  No insurance pt working on [place] application   [facility] will be doing drsg changes     Were Treatment Team discharge recommendations reviewed with patient/caregiver?: Yes  Did patient/caregiver verbalize understanding of patient care needs?: Yes            Requested  [address]         Is the patient interested in [address] at discharge?: Yes  [facility] Cee Montemayor requested[de-identified] [address] Name[de-identified] [address] [place] [address] Provider[de-identified] "PCP  Home Health Services Needed[de-identified] Wound/Ostomy Care  Homebound Criteria Met[de-identified] Requires Medical Transportation  Supporting Clincal Findings[de-identified] Limited Endurance    DME Referral Provided  Referral made for DME?: No    Other Referral/Resources/Interventions Provided:  Interventions: HHC  Referral Comments: CM referral for \" home needs post acute\"  [facility]NA will do drsg changes         Treatment Team Recommendation: Home with [facility]  Discharge Destination Plan[de-identified] Home with [place] at Discharge : Automobile                                                               "

## 2023-04-27 NOTE — DISCHARGE INSTR - OTHER ORDERS
Remove midline umbilical packing on 9/15 in the late morning  Flush wound with 10 cc normal saline  Replace packing 1/2 inch plain packing  Cover with 4x4 gauze and an ABD pad  Change outer dressing as needed for saturation   Change Packing every 24 hours for 5 days

## 2023-04-27 NOTE — TELEPHONE ENCOUNTER
New Patient    What is the reason for the patient’s appointment?: Patient being referred to [facility] because she had hernia surgery on Tuesday and she said they wanted to rule out something being attached to her bladder  What office location does the patient prefer?: [facility] , Dr Annmarie Apley    Does patient have Imaging/Lab Results:     CT abdomen pelvis     IMPRESSION:     Narrow neck umbilical hernia containing an abscess measuring 3 x 5 8 x 4 6 cm with adjacent ventral abdominal wall cellulitis  Recommend surgical consultation          Have patient records been requested?:  If No, are the records showing in Epic:       INSURANCE:  Do we accept the patient's insurance or is the patient Self-Pay?: Self pay (explained 50% self pay discount)    Insurance Provider:  Plan Type/Number:  Member ID#:       HISTORY:   Has the patient had any previous Urologist(s)?: No    Was the patient seen in the ED?: Patient was in the hospital from 4/25 until toda     Has the patient had any outside testing done?:  Basic labs done during hospital stay     Does the patient have a personal history of cancer?: No    Please advise appropriate time frame for scheduling since no upcoming NP appts  Patient stated the ER told her within 2 weeks       Patient can be reached at [phone]

## 2023-04-27 NOTE — PROCEDURES
Patient's umbilicus was prepped and draped in the usual sterile fashion  10 cc lidocaine was infiltrated into the skin/subcutaneous tissue along her umbilical incision  After appropriate level of local anesthesia was achieved, one 3-0 nylon suture was placed in the middle of the incision in an interrupted fashion  The wound was then packed with gauze packing and a clean dressing was applied  Patient tolerated the procedure well without any complications  Patient was instructed to leave the packing in place for 24 hours, after which can be removed

## 2023-04-27 NOTE — PLAN OF CARE
Problem: PAIN - ADULT  Goal: Verbalizes/displays adequate comfort level or baseline comfort level  Description: Interventions:  - Encourage patient to monitor pain and request assistance  - Assess pain using appropriate pain scale  - Administer analgesics based on type and severity of pain and evaluate response  - Implement non-pharmacological measures as appropriate and evaluate response  - Consider cultural and social influences on pain and pain management  - Notify physician/advanced practitioner if interventions unsuccessful or patient reports new pain  Outcome: Progressing     Problem: INFECTION - ADULT  Goal: Absence or prevention of progression during hospitalization  Description: INTERVENTIONS:  - Assess and monitor for signs and symptoms of infection  - Monitor lab/diagnostic results  - Monitor all insertion sites, i e  indwelling lines, tubes, and drains  - Monitor endotracheal if appropriate and nasal secretions for changes in amount and color  - [place] appropriate cooling/warming therapies per order  - Administer medications as ordered  - Instruct and encourage patient and family to use good hand hygiene technique  - Identify and instruct in appropriate isolation precautions for identified infection/condition  Outcome: Progressing     Problem: SAFETY ADULT  Goal: Patient will remain free of falls  Description: INTERVENTIONS:  - Educate patient/family on patient safety including physical limitations  - Instruct patient to call for assistance with activity   - Consult OT/PT to assist with strengthening/mobility   - Keep Call bell within reach  - Keep bed low and locked with side rails adjusted as appropriate  - Keep care items and personal belongings within reach  - Initiate and maintain comfort rounds  - Make Fall Risk Sign visible to staff  - Offer Toileting every 2 Hours, in advance of need  - Initiate/Maintain bed alarm, if needed  - Obtain necessary fall risk management equipment  - Apply yellow socks and bracelet for high fall risk patients  - Consider moving patient to room near nurses station  Outcome: Progressing  Goal: Maintain or return to baseline ADL function  Description: INTERVENTIONS:  -  Assess patient's ability to carry out ADLs; assess patient's baseline for ADL function and identify physical deficits which impact ability to perform ADLs (bathing, care of mouth/teeth, toileting, grooming, dressing, etc )  - Assess/evaluate cause of self-care deficits   - Assess range of motion  - Assess patient's mobility; develop plan if impaired  - Assess patient's need for assistive devices and provide as appropriate  - Encourage maximum independence but intervene and supervise when necessary  - Involve family in performance of ADLs  - Assess for home care needs following discharge   - Consider OT consult to assist with ADL evaluation and planning for discharge  - Provide patient education as appropriate  Outcome: Progressing  Goal: Maintains/Returns to pre admission functional level  Description: INTERVENTIONS:  - Perform BMAT or MOVE assessment daily    - Set and communicate daily mobility goal to care team and patient/family/caregiver     - Collaborate with rehabilitation services on mobility goals if consulted  - Ambulate patient   - Out of bed to chair   - Out of bed for meals   - Out of bed for toileting  - Record patient progress and toleration of activity level   Outcome: Progressing     Problem: DISCHARGE PLANNING  Goal: Discharge to home or other facility with appropriate resources  Description: INTERVENTIONS:  - Identify barriers to discharge w/patient and caregiver  - Arrange for needed discharge resources and transportation as appropriate  - Identify discharge learning needs (meds, wound care, etc )  - Arrange for interpretive services to assist at discharge as needed  - Refer to Case Management Department for coordinating discharge planning if the patient needs post-hospital services based on physician/advanced practitioner order or complex needs related to functional status, cognitive ability, or social support system  Outcome: Progressing     Problem: Knowledge Deficit  Goal: Patient/family/caregiver demonstrates understanding of disease process, treatment plan, medications, and discharge instructions  Description: Complete learning assessment and assess knowledge base    Interventions:  - Provide teaching at level of understanding  - Provide teaching via preferred learning methods  Outcome: Progressing

## 2023-04-27 NOTE — PROGRESS NOTES
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  VS: AVSS, room air   UOP: multiple voids      AM Labs:  WBC 5 8 from 8 7  Hemoglobin stable at 12 7    OR Cx: GPcs, GNRs    Plan:  -diet as tolerated   -pain/nausea control   -continue abx, follow-up Or culture data  And tailor as appropriate   -OOB, ambulate  -Packing changed today with partial wound closure  -pulmonary toilet  -disposition planning     Subjective/Objective       Subjective: No acute events overnight  Moderate pain which is improving, intermittent emesis primarily with narcotic administration, moderate drainage on dressing     Objective:     Blood pressure 118/81, pulse 83, temperature 97 6 °F (36 4 °C), resp  rate 16, height 5' 5\" (1 651 m), weight 87 8 kg (193 lb 9 oz), SpO2 97 %, not currently breastfeeding  ,Body mass index is 32 21 kg/m²  No intake or output data in the 24 hours ending 04/27/23 0917    Invasive Devices     Peripheral Intravenous Line  Duration           Peripheral IV 04/25/23 Left;Proximal;Ventral (anterior) Forearm 1 day              Physical Exam:  General: No acute distress, alert and oriented  CV: Well perfused, regular rate and rhythm  Lungs: Normal work of breathing, no increased respiratory effort  Abdomen: Soft, tender at incision, improving erythema adjacent to the incision, moderate serosanguineous saturation present in the packing and overlying dressing  Extremities: No edema, clubbing or cyanosis  Skin: Warm, dry      Lab, Imaging and other studies:I have personally reviewed pertinent lab results    , CBC:   Lab Results   Component Value Date    WBC 5 80 04/27/2023    HGB 12 7 04/27/2023    HCT 38 5 04/27/2023    MCV 85 04/27/2023     04/27/2023    MCH 28 0 04/27/2023    MCHC 33 0 04/27/2023    " RDW 12 9 04/27/2023    MPV 9 8 04/27/2023    NRBC 0 04/27/2023   , CMP:   Lab Results   Component Value Date    SODIUM 137 04/26/2023    K 3 4 (L) 04/26/2023     04/26/2023    CO2 25 04/26/2023    BUN 10 04/26/2023    CREATININE 0 61 04/26/2023    CALCIUM 9 2 04/26/2023    EGFR 126 04/26/2023   , Coagulation: No results found for: PT, INR, APTT  VTE Pharmacologic Prophylaxis: Heparin  VTE Mechanical Prophylaxis: sequential compression device

## 2023-04-27 NOTE — PROGRESS NOTES
-- Patient:  -- MRN: [id number]  -- Aidin Request ID: [id number]  -- Level of care reserved: [facility]  -- Partner [facility], [address] [place] [address] [phone]  -- Clinical needs requested: skilled nursing  -- Geography searched: [id number]  -- Start of Service:  -- Request sent: 11:11am EDT on 4/27/2023 by [employer]  -- Partner reserved: 2:14pm EDT on 4/27/2023 by [employer]  -- Choice list shared:

## 2023-04-27 NOTE — PLAN OF CARE
Problem: PAIN - ADULT  Goal: Verbalizes/displays adequate comfort level or baseline comfort level  Description: Interventions:  - Encourage patient to monitor pain and request assistance  - Assess pain using appropriate pain scale  - Administer analgesics based on type and severity of pain and evaluate response  - Implement non-pharmacological measures as appropriate and evaluate response  - Consider cultural and social influences on pain and pain management  - Notify physician/advanced practitioner if interventions unsuccessful or patient reports new pain  Outcome: Progressing     Problem: INFECTION - ADULT  Goal: Absence or prevention of progression during hospitalization  Description: INTERVENTIONS:  - Assess and monitor for signs and symptoms of infection  - Monitor lab/diagnostic results  - Monitor all insertion sites, i e  indwelling lines, tubes, and drains  - Monitor endotracheal if appropriate and nasal secretions for changes in amount and color  - [place] appropriate cooling/warming therapies per order  - Administer medications as ordered  - Instruct and encourage patient and family to use good hand hygiene technique  - Identify and instruct in appropriate isolation precautions for identified infection/condition  Outcome: Progressing     Problem: SAFETY ADULT  Goal: Patient will remain free of falls  Description: INTERVENTIONS:  - Educate patient/family on patient safety including physical limitations  - Instruct patient to call for assistance with activity   - Consult OT/PT to assist with strengthening/mobility   - Keep Call bell within reach  - Keep bed low and locked with side rails adjusted as appropriate  - Keep care items and personal belongings within reach  - Initiate and maintain comfort rounds  - Make Fall Risk Sign visible to staff  - Offer Toileting every 2 Hours, in advance of need  - Initiate/Maintain bed alarm, if needed  - Obtain necessary fall risk management equipment  - Apply yellow socks and bracelet for high fall risk patients  - Consider moving patient to room near nurses station  Outcome: Progressing  Goal: Maintain or return to baseline ADL function  Description: INTERVENTIONS:  -  Assess patient's ability to carry out ADLs; assess patient's baseline for ADL function and identify physical deficits which impact ability to perform ADLs (bathing, care of mouth/teeth, toileting, grooming, dressing, etc )  - Assess/evaluate cause of self-care deficits   - Assess range of motion  - Assess patient's mobility; develop plan if impaired  - Assess patient's need for assistive devices and provide as appropriate  - Encourage maximum independence but intervene and supervise when necessary  - Involve family in performance of ADLs  - Assess for home care needs following discharge   - Consider OT consult to assist with ADL evaluation and planning for discharge  - Provide patient education as appropriate  Outcome: Progressing  Goal: Maintains/Returns to pre admission functional level  Description: INTERVENTIONS:  - Perform BMAT or MOVE assessment daily    - Set and communicate daily mobility goal to care team and patient/family/caregiver     - Collaborate with rehabilitation services on mobility goals if consulted  - Ambulate patient   - Out of bed to chair   - Out of bed for meals   - Out of bed for toileting  - Record patient progress and toleration of activity level   Outcome: Progressing     Problem: DISCHARGE PLANNING  Goal: Discharge to home or other facility with appropriate resources  Description: INTERVENTIONS:  - Identify barriers to discharge w/patient and caregiver  - Arrange for needed discharge resources and transportation as appropriate  - Identify discharge learning needs (meds, wound care, etc )  - Arrange for interpretive services to assist at discharge as needed  - Refer to Case Management Department for coordinatiischarge planning if the patient needs post-hospital services based on physician/advanced practitioner order or complex needs related to functional status, cognitive ability, or social support system  Outcome: Progressing     Problem: Knowledge Deficit  Goal: Patient/family/caregiver demonstrates understanding of disease process, treatment plan, medications, and discharge instructions  Description: Complete learning assessment and assess knowledge base    Interventions:  - Provide teaching at level of understanding  - Provide teaching via preferred learning methods  Outcome: Progressing

## 2023-04-28 ENCOUNTER — TELEPHONE (OUTPATIENT)
Dept: SURGERY | Facility: CLINIC | Age: 28
End: 2023-04-28

## 2023-04-28 ENCOUNTER — HOME CARE VISIT (OUTPATIENT)
Dept: HOME HEALTH SERVICES | Facility: HOME HEALTHCARE | Age: 28
End: 2023-04-28

## 2023-04-28 VITALS
TEMPERATURE: 97.2 F | HEART RATE: 92 BPM | WEIGHT: 198 LBS | DIASTOLIC BLOOD PRESSURE: 72 MMHG | SYSTOLIC BLOOD PRESSURE: 118 MMHG | RESPIRATION RATE: 20 BRPM | OXYGEN SATURATION: 98 % | BODY MASS INDEX: 32.95 KG/M2

## 2023-04-28 LAB
BACTERIA WND AEROBE CULT: ABNORMAL
BACTERIA WND AEROBE CULT: ABNORMAL
GRAM STN SPEC: ABNORMAL

## 2023-04-28 NOTE — TELEPHONE ENCOUNTER
PT contacted and reviewed information  Confirmed time date and location for pt  Along with 15 minute early arrival time

## 2023-04-29 ENCOUNTER — HOME CARE VISIT (OUTPATIENT)
Dept: HOME HEALTH SERVICES | Facility: HOME HEALTHCARE | Age: 28
End: 2023-04-29

## 2023-04-29 VITALS
HEART RATE: 76 BPM | TEMPERATURE: 97.2 F | DIASTOLIC BLOOD PRESSURE: 72 MMHG | OXYGEN SATURATION: 98 % | RESPIRATION RATE: 20 BRPM | SYSTOLIC BLOOD PRESSURE: 112 MMHG

## 2023-04-30 ENCOUNTER — HOME CARE VISIT (OUTPATIENT)
Dept: HOME HEALTH SERVICES | Facility: HOME HEALTHCARE | Age: 28
End: 2023-04-30

## 2023-05-01 ENCOUNTER — HOME CARE VISIT (OUTPATIENT)
Dept: HOME HEALTH SERVICES | Facility: HOME HEALTHCARE | Age: 28
End: 2023-05-01

## 2023-05-01 VITALS
TEMPERATURE: 98 F | RESPIRATION RATE: 20 BRPM | HEART RATE: 78 BPM | SYSTOLIC BLOOD PRESSURE: 120 MMHG | DIASTOLIC BLOOD PRESSURE: 60 MMHG | OXYGEN SATURATION: 99 %

## 2023-05-02 ENCOUNTER — HOME CARE VISIT (OUTPATIENT)
Dept: HOME HEALTH SERVICES | Facility: HOME HEALTHCARE | Age: 28
End: 2023-05-02

## 2023-05-02 VITALS
DIASTOLIC BLOOD PRESSURE: 64 MMHG | TEMPERATURE: 97.2 F | SYSTOLIC BLOOD PRESSURE: 112 MMHG | OXYGEN SATURATION: 98 % | RESPIRATION RATE: 16 BRPM | HEART RATE: 60 BPM

## 2023-05-02 PROCEDURE — 88312 SPECIAL STAINS GROUP 1: CPT | Performed by: SPECIALIST

## 2023-05-02 PROCEDURE — 88342 IMHCHEM/IMCYTCHM 1ST ANTB: CPT | Performed by: SPECIALIST

## 2023-05-02 PROCEDURE — 88304 TISSUE EXAM BY PATHOLOGIST: CPT | Performed by: SPECIALIST

## 2023-05-03 ENCOUNTER — HOME CARE VISIT (OUTPATIENT)
Dept: HOME HEALTH SERVICES | Facility: HOME HEALTHCARE | Age: 28
End: 2023-05-03

## 2023-05-03 VITALS
HEART RATE: 56 BPM | RESPIRATION RATE: 16 BRPM | TEMPERATURE: 96.4 F | DIASTOLIC BLOOD PRESSURE: 72 MMHG | OXYGEN SATURATION: 98 % | SYSTOLIC BLOOD PRESSURE: 112 MMHG

## 2023-05-04 ENCOUNTER — HOME CARE VISIT (OUTPATIENT)
Dept: HOME HEALTH SERVICES | Facility: HOME HEALTHCARE | Age: 28
End: 2023-05-04

## 2023-05-04 NOTE — PROGRESS NOTES
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     Pathology:  Reviewed with patient, all questions answered  Patient is pleased with the outcome of surgery and is doing well  Will need urology follow-up  Discussed with Dr Luna Marquez while in the hospital   Might need excision of a patent urachal cyst   Patient is aware  Postoperative restrictions reviewed  All questions answered  ____________________________________________________________    HPI:  Shawn Schmitt is a 24 y  o female who comes in today for postoperative check after recent surgery  Currently doing well without problems, no fever or chills,no nausea and no vomiting  Reports well  Minimal serosanguineous drainage of the periumbilical tissue  She still packing it       ROS:  General ROS: negative for - chills, fatigue, fever or night sweats, weight loss  Respiratory ROS: no cough, shortness of breath, or wheezing  Cardiovascular ROS: no chest pain or dyspnea on exertion  Genito-Urinary ROS: no dysuria, trouble voiding, or hematuria  Musculoskeletal ROS: negative for - gait disturbance, joint pain or muscle pain  Neurological ROS: no TIA or stroke symptoms  GI ROS: see HPI  Skin ROS: no new rashes or lesions   Lymphatic ROS: no new adenopathy noted by pt     GYN ROS: see HPI, no new GYN history or bleeding noted  Psy ROS: no new mental or behavioral disturbances       Patient Active Problem List   Diagnosis   • Encounter for surveillance of "contraceptive pills   • Encounter for counseling regarding contraception   • Abscess, umbilical         Allergies:  Patient has no known allergies  Current Outpatient Medications:   •  acetaminophen (TYLENOL) 325 mg tablet, Take 650 mg by mouth every 6 (six) hours as needed for moderate pain  Indications: Pain, Disp: , Rfl:     No past medical history on file  Past Surgical History:   Procedure Laterality Date   • WOUND DEBRIDEMENT N/A 4/25/2023    Procedure: EXCISIONAL DEBRIDEMENT UMBILICAL HERNIA;  Surgeon: Marquise Davey MD;  Location: [facility];  Service: General       Family History   Problem Relation Age of Onset   • Hypertension Mother    • Diabetes Family    • Hypertension Family    • Cancer Family         reports that she has never smoked  She has never been exposed to tobacco smoke  She has never used smokeless tobacco  She reports current alcohol use  She reports that she does not use drugs  Invalid input(s):  EOSPCT          Invalid input(s): LABALBU    Imaging: No new pertinent imaging studies  Vitals:    05/08/23 0937   BP: 120/74   Pulse: 85   Temp: 97 5 °F (36 4 °C)        PHYSICAL EXAM  General: normal, cooperative, no distress  Incision: clean, dry, and intact and healing well 2 tiny defects measuring 2 mm on each of the lateral aspects  We repacked this with quarter inch Nu Gauze with Betadine moistened in saline  Some portions of this record may have been generated with voice recognition software  There may be translation, syntax,  or grammatical errors  Occasional wrong word or \"sound-a-like\" substitutions may have occurred due to the inherent limitations of the voice recognition software  Read the chart carefully and recognize, using context, where substitutions may have occurred  If you have any questions, please contact the dictating provider for clarification or correction, as needed  This encounter has been coded by a non-certified coder         Marquise Davey, " MD    Date: 5/8/2023 Time: 10:13 AM

## 2023-05-05 ENCOUNTER — HOME CARE VISIT (OUTPATIENT)
Dept: HOME HEALTH SERVICES | Facility: HOME HEALTHCARE | Age: 28
End: 2023-05-05

## 2023-05-05 VITALS
SYSTOLIC BLOOD PRESSURE: 96 MMHG | RESPIRATION RATE: 20 BRPM | OXYGEN SATURATION: 99 % | HEART RATE: 84 BPM | TEMPERATURE: 97.3 F | DIASTOLIC BLOOD PRESSURE: 60 MMHG

## 2023-05-05 VITALS
DIASTOLIC BLOOD PRESSURE: 60 MMHG | RESPIRATION RATE: 18 BRPM | TEMPERATURE: 98 F | SYSTOLIC BLOOD PRESSURE: 120 MMHG | OXYGEN SATURATION: 100 % | HEART RATE: 78 BPM

## 2023-05-08 ENCOUNTER — OFFICE VISIT (OUTPATIENT)
Dept: SURGERY | Facility: CLINIC | Age: 28
End: 2023-05-08

## 2023-05-08 VITALS
HEART RATE: 85 BPM | BODY MASS INDEX: 32.59 KG/M2 | DIASTOLIC BLOOD PRESSURE: 74 MMHG | HEIGHT: 65 IN | SYSTOLIC BLOOD PRESSURE: 120 MMHG | TEMPERATURE: 97.5 F | WEIGHT: 195.6 LBS

## 2023-05-08 DIAGNOSIS — R10.33 UMBILICAL PAIN: ICD-10-CM

## 2023-05-08 DIAGNOSIS — L02.216 ABSCESS, UMBILICAL: Primary | ICD-10-CM

## 2023-05-09 ENCOUNTER — HOME CARE VISIT (OUTPATIENT)
Dept: HOME HEALTH SERVICES | Facility: HOME HEALTHCARE | Age: 28
End: 2023-05-09

## 2023-05-09 VITALS
HEART RATE: 68 BPM | RESPIRATION RATE: 20 BRPM | TEMPERATURE: 97.1 F | SYSTOLIC BLOOD PRESSURE: 106 MMHG | DIASTOLIC BLOOD PRESSURE: 68 MMHG | OXYGEN SATURATION: 98 %

## 2023-05-09 NOTE — PROGRESS NOTES
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     Pathology:  Reviewed with patient, all questions answered  Patient is pleased with the outcome of surgery and is doing well  Will need urology follow-up  Discussed with Dr Annita Davalos while in the hospital   Might need excision of a patent urachal cyst   Patient is aware  Postoperative restrictions reviewed  All questions answered  ____________________________________________________________    HPI:  Tl Medina is a 24 y  o female who comes in today for postoperative check after recent surgery  Currently doing well without problems, no fever or chills,no nausea and no vomiting  Reports well  Minimal serosanguineous drainage of the periumbilical tissue  She still packing it       ROS:  General ROS: negative for - chills, fatigue, fever or night sweats, weight loss  Respiratory ROS: no cough, shortness of breath, or wheezing  Cardiovascular ROS: no chest pain or dyspnea on exertion  Genito-Urinary ROS: no dysuria, trouble voiding, or hematuria  Musculoskeletal ROS: negative for - gait disturbance, joint pain or muscle pain  Neurological ROS: no TIA or stroke symptoms  GI ROS: see HPI  Skin ROS: no new rashes or lesions   Lymphatic ROS: no new adenopathy noted by pt     GYN ROS: see HPI, no new GYN history or bleeding noted  Psy ROS: no new mental or behavioral disturbances       Patient Active Problem List Diagnosis   • Encounter for surveillance of contraceptive pills   • Encounter for counseling regarding contraception   • Abscess, umbilical         Allergies:  Patient has no known allergies  Current Outpatient Medications:   •  acetaminophen (TYLENOL) 325 mg tablet, Take 650 mg by mouth every 6 (six) hours as needed for moderate pain  Indications: Pain, Disp: , Rfl:     No past medical history on file  Past Surgical History:   Procedure Laterality Date   • WOUND DEBRIDEMENT N/A 4/25/2023    Procedure: EXCISIONAL DEBRIDEMENT UMBILICAL HERNIA;  Surgeon: Dom Jenkins MD;  Location: [facility];  Service: General       Family History   Problem Relation Age of Onset   • Hypertension Mother    • Diabetes Family    • Hypertension Family    • Cancer Family         reports that she has never smoked  She has never been exposed to tobacco smoke  She has never used smokeless tobacco  She reports current alcohol use  She reports that she does not use drugs  Invalid input(s):  EOSPCT          Invalid input(s): LABALBU    Imaging: No new pertinent imaging studies  Vitals:    05/11/23 1537   BP: 122/72   Pulse: 88   Temp: 98 1 °F (36 7 °C)        PHYSICAL EXAM  General: normal, cooperative, no distress  Incision: clean, dry, and intact and healing well 2 tiny defects measuring 2 mm on each of the lateral aspects  We repacked this with quarter inch Nu Gauze with Betadine moistened in saline        Eugenio Mckeon PA-C    Date: 5/11/2023 Time: 3:52 PM

## 2023-05-10 ENCOUNTER — HOME CARE VISIT (OUTPATIENT)
Dept: HOME HEALTH SERVICES | Facility: HOME HEALTHCARE | Age: 28
End: 2023-05-10

## 2023-05-10 VITALS
OXYGEN SATURATION: 98 % | HEART RATE: 68 BPM | DIASTOLIC BLOOD PRESSURE: 78 MMHG | SYSTOLIC BLOOD PRESSURE: 124 MMHG | TEMPERATURE: 97.8 F | RESPIRATION RATE: 20 BRPM

## 2023-05-11 ENCOUNTER — OFFICE VISIT (OUTPATIENT)
Dept: SURGERY | Facility: CLINIC | Age: 28
End: 2023-05-11

## 2023-05-11 ENCOUNTER — HOME CARE VISIT (OUTPATIENT)
Dept: HOME HEALTH SERVICES | Facility: HOME HEALTHCARE | Age: 28
End: 2023-05-11

## 2023-05-11 VITALS
DIASTOLIC BLOOD PRESSURE: 72 MMHG | SYSTOLIC BLOOD PRESSURE: 122 MMHG | HEIGHT: 65 IN | TEMPERATURE: 98.1 F | BODY MASS INDEX: 32.69 KG/M2 | HEART RATE: 88 BPM | WEIGHT: 196.2 LBS

## 2023-05-11 DIAGNOSIS — Z51.89 WOUND CHECK, ABSCESS: Primary | ICD-10-CM

## 2023-05-11 NOTE — CASE COMMUNICATION
Patient discharged from [facility] today as patient able to care for abdominal wound at this time

## 2023-05-15 ENCOUNTER — OFFICE VISIT (OUTPATIENT)
Dept: SURGERY | Facility: CLINIC | Age: 28
End: 2023-05-15

## 2023-05-15 VITALS
WEIGHT: 196.8 LBS | TEMPERATURE: 98.1 F | DIASTOLIC BLOOD PRESSURE: 70 MMHG | HEART RATE: 88 BPM | BODY MASS INDEX: 32.79 KG/M2 | SYSTOLIC BLOOD PRESSURE: 116 MMHG | HEIGHT: 65 IN

## 2023-05-15 DIAGNOSIS — Z48.89 ENCOUNTER FOR POSTOPERATIVE WOUND CARE: Primary | ICD-10-CM

## 2023-05-15 NOTE — PROGRESS NOTES
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  Has appointment booked  Pathology:  Reviewed with patient, all questions answered  Patient is pleased with the outcome of surgery and is doing well  Will need urology follow-up  Discussed with Dr Sarmad Hutchins while in the hospital   Might need excision of a patent urachal cyst   Patient is aware  Postoperative restrictions reviewed  All questions answered  ____________________________________________________________    HPI:  Edilberto Mcdonald is a 24 y  o female who comes in today for postoperative check after recent surgery  Currently doing well without problems, no fever or chills,no nausea and no vomiting  Reports well  Minimal serosanguineous drainage of the periumbilical tissue  She still packing it       ROS:  General ROS: negative for - chills, fatigue, fever or night sweats, weight loss  Respiratory ROS: no cough, shortness of breath, or wheezing  Cardiovascular ROS: no chest pain or dyspnea on exertion  Genito-Urinary ROS: no dysuria, trouble voiding, or hematuria  Musculoskeletal ROS: negative for - gait disturbance, joint pain or muscle pain  Neurological ROS: no TIA or stroke symptoms  GI ROS: see HPI  Skin ROS: no new rashes or lesions   Lymphatic ROS: no new adenopathy noted by pt     GYN ROS: see HPI, no new GYN history or bleeding noted  Psy ROS: no new mental or behavioral disturbances       Patient Active Problem List   Diagnosis   • Encounter for surveillance of contraceptive pills   • Encounter for counseling regarding contraception   • Abscess, umbilical         Allergies:  Patient has no known allergies  Current Outpatient Medications:   •  acetaminophen (TYLENOL) 325 mg tablet, Take 650 mg by mouth every 6 (six) hours as needed for moderate pain  Indications: Pain, Disp: , Rfl:     No past medical history on file  Past Surgical History:   Procedure Laterality Date   • WOUND DEBRIDEMENT N/A 4/25/2023    Procedure: EXCISIONAL DEBRIDEMENT UMBILICAL HERNIA;  Surgeon: Irene Herring MD;  Location[facility];  Service: General       Family History   Problem Relation Age of Onset   • Hypertension Mother    • Diabetes Family    • Hypertension Family    • Cancer Family         reports that she has never smoked  She has never been exposed to tobacco smoke  She has never used smokeless tobacco  She reports current alcohol use  She reports that she does not use drugs  Invalid input(s):  EOSPCT          Invalid input(s): LABALBU    Imaging: No new pertinent imaging studies  Vitals:    05/15/23 1316   BP: 116/70   Pulse: 88   Temp: 98 1 °F (36 7 °C)        PHYSICAL EXAM  General: normal, cooperative, no distress  Incision: clean, dry, and intact and healing well very minimal drainage and almost fully closed on right side of umbilical wound the rest is healing beautifully  bandaid covered area just in case small amount of discharge         Jean-Pierre Valencia PA-C    Date: 5/15/2023 Time: 1:20 PM

## 2023-05-16 ENCOUNTER — TELEPHONE (OUTPATIENT)
Dept: SURGERY | Facility: CLINIC | Age: 28
End: 2023-05-16

## 2023-05-20 ENCOUNTER — HOSPITAL ENCOUNTER (EMERGENCY)
Facility: HOSPITAL | Age: 28
Discharge: HOME | End: 2023-05-20
Attending: EMERGENCY MEDICINE

## 2023-05-20 ENCOUNTER — APPOINTMENT (EMERGENCY)
Dept: CT IMAGING | Facility: HOSPITAL | Age: 28
End: 2023-05-20

## 2023-05-20 VITALS
OXYGEN SATURATION: 99 % | DIASTOLIC BLOOD PRESSURE: 81 MMHG | HEART RATE: 75 BPM | RESPIRATION RATE: 14 BRPM | TEMPERATURE: 98.1 F | WEIGHT: 198.19 LBS | BODY MASS INDEX: 32.98 KG/M2 | SYSTOLIC BLOOD PRESSURE: 129 MMHG

## 2023-05-20 DIAGNOSIS — K04.7 DENTAL INFECTION: Primary | ICD-10-CM

## 2023-05-20 LAB
ANION GAP SERPL CALCULATED.3IONS-SCNC: 7 MMOL/L (ref 4–13)
BASOPHILS # BLD AUTO: 0.03 THOUSANDS/ÂΜL (ref 0–0.1)
BASOPHILS NFR BLD AUTO: 0 % (ref 0–1)
BUN SERPL-MCNC: 8 MG/DL (ref 5–25)
CALCIUM SERPL-MCNC: 9.1 MG/DL (ref 8.4–10.2)
CHLORIDE SERPL-SCNC: 104 MMOL/L (ref 96–108)
CO2 SERPL-SCNC: 26 MMOL/L (ref 21–32)
CREAT SERPL-MCNC: 0.69 MG/DL (ref 0.6–1.3)
EOSINOPHIL # BLD AUTO: 0.11 THOUSAND/ÂΜL (ref 0–0.61)
EOSINOPHIL NFR BLD AUTO: 1 % (ref 0–6)
ERYTHROCYTE [DISTWIDTH] IN BLOOD BY AUTOMATED COUNT: 13 % (ref 11.6–15.1)
EXT PREGNANCY TEST URINE: NEGATIVE
EXT. CONTROL: NORMAL
GFR SERPL CREATININE-BSD FRML MDRD: 121 ML/MIN/1.73SQ M
GLUCOSE SERPL-MCNC: 84 MG/DL (ref 65–140)
HCT VFR BLD AUTO: 39.3 % (ref 34.8–46.1)
HGB BLD-MCNC: 12.9 G/DL (ref 11.5–15.4)
IMM GRANULOCYTES # BLD AUTO: 0.02 THOUSAND/UL (ref 0–0.2)
IMM GRANULOCYTES NFR BLD AUTO: 0 % (ref 0–2)
LYMPHOCYTES # BLD AUTO: 1.2 THOUSANDS/ÂΜL (ref 0.6–4.47)
LYMPHOCYTES NFR BLD AUTO: 14 % (ref 14–44)
MCH RBC QN AUTO: 28.2 PG (ref 26.8–34.3)
MCHC RBC AUTO-ENTMCNC: 32.8 G/DL (ref 31.4–37.4)
MCV RBC AUTO: 86 FL (ref 82–98)
MONOCYTES # BLD AUTO: 0.53 THOUSAND/ÂΜL (ref 0.17–1.22)
MONOCYTES NFR BLD AUTO: 6 % (ref 4–12)
NEUTROPHILS # BLD AUTO: 6.57 THOUSANDS/ÂΜL (ref 1.85–7.62)
NEUTS SEG NFR BLD AUTO: 79 % (ref 43–75)
NRBC BLD AUTO-RTO: 0 /100 WBCS
PLATELET # BLD AUTO: 173 THOUSANDS/UL (ref 149–390)
PMV BLD AUTO: 10.3 FL (ref 8.9–12.7)
POTASSIUM SERPL-SCNC: 4 MMOL/L (ref 3.5–5.3)
RBC # BLD AUTO: 4.57 MILLION/UL (ref 3.81–5.12)
SODIUM SERPL-SCNC: 137 MMOL/L (ref 135–147)
WBC # BLD AUTO: 8.46 THOUSAND/UL (ref 4.31–10.16)

## 2023-05-20 RX ORDER — AMOXICILLIN AND CLAVULANATE POTASSIUM 875; 125 MG/1; MG/1
1 TABLET, FILM COATED ORAL EVERY 12 HOURS
Qty: 20 TABLET | Refills: 0 | Status: SHIPPED | OUTPATIENT
Start: 2023-05-20 | End: 2023-05-30

## 2023-05-20 RX ADMIN — IOHEXOL 85 ML: 350 INJECTION, SOLUTION INTRAVENOUS at 14:20

## 2023-05-20 NOTE — ED PROVIDER NOTES
History  Chief Complaint   Patient presents with   • Facial Swelling     Reports dental pain yesterday today work up with swelling to left cheek      Patient is a 72-year-old female without significant past medical history presenting for evaluation of left-sided facial swelling for 1 day  States she had some mild left upper dental pain yesterday but this is since resolved  She woke up this morning with swelling to the left side of her face  Denies significant pain, mostly notes that it is uncomfortable  No trismus, difficulty swallowing, neck pain, shortness of breath  No fevers, URI symptoms, sore throat  No history of dental caries, dental infections, abscesses  No facial trauma  No new medications  No meds prior to arrival           Prior to Admission Medications   Prescriptions Last Dose Informant Patient Reported? Taking?   acetaminophen (TYLENOL) 325 mg tablet   Yes No   Sig: Take 650 mg by mouth every 6 (six) hours as needed for moderate pain  Indications: Pain      Facility-Administered Medications: None       History reviewed  No pertinent past medical history  Past Surgical History:   Procedure Laterality Date   • HERNIA REPAIR     • WOUND DEBRIDEMENT N/A 04/25/2023    Procedure: EXCISIONAL DEBRIDEMENT UMBILICAL HERNIA;  Surgeon: Jennifer Arguelles MD;  Location: [facility];  Service: General       Family History   Problem Relation Age of Onset   • Hypertension Mother    • Diabetes Family    • Hypertension Family    • Cancer Family      I have reviewed and agree with the history as documented      E-Cigarette/Vaping   • E-Cigarette Use Current Some Day User      E-Cigarette/Vaping Substances   • Nicotine Yes      Social History     Tobacco Use   • Smoking status: Never     Passive exposure: Never   • Smokeless tobacco: Never   Vaping Use   • Vaping Use: Some days   • Substances: Nicotine   Substance Use Topics   • Alcohol use: Yes     Comment: occasionally   • Drug use: No       Review of Systems Constitutional: Negative for chills and fever  HENT: Positive for dental problem and facial swelling  Negative for congestion, drooling, ear pain, mouth sores, sore throat, trouble swallowing and voice change  Eyes: Negative for pain and visual disturbance  Respiratory: Negative for cough, shortness of breath and stridor  Cardiovascular: Negative for chest pain, palpitations and leg swelling  Gastrointestinal: Negative for abdominal pain, diarrhea, nausea and vomiting  Genitourinary: Negative for dysuria and hematuria  Musculoskeletal: Negative for arthralgias and back pain  Skin: Negative for color change and rash  Neurological: Negative for dizziness, seizures, syncope and headaches  All other systems reviewed and are negative  Physical Exam  Physical Exam  Vitals and nursing note reviewed  Constitutional:       General: She is not in acute distress  Appearance: Normal appearance  She is not ill-appearing or toxic-appearing  HENT:      Head: Normocephalic and atraumatic  Jaw: There is normal jaw occlusion  Swelling present  No trismus, tenderness or pain on movement  Comments: Diffuse left cheek swelling  No overlying erythema, warmth, open wounds  Mild tenderness to palpation  No focal areas of fluctuance  Right Ear: Tympanic membrane, ear canal and external ear normal       Left Ear: Tympanic membrane, ear canal and external ear normal       Nose: Nose normal       Mouth/Throat:      Mouth: Mucous membranes are moist  No oral lesions  Dentition: Normal dentition  No dental tenderness, gingival swelling, dental caries, dental abscesses or gum lesions  Tongue: No lesions  Pharynx: Uvula midline  No oropharyngeal exudate or posterior oropharyngeal erythema  Tonsils: No tonsillar exudate or tonsillar abscesses  Comments: Airway is patent  Managing oral secretions  Eyes:      General: No scleral icterus  Right eye: No discharge  Left eye: No discharge  Extraocular Movements: Extraocular movements intact  Conjunctiva/sclera: Conjunctivae normal    Cardiovascular:      Rate and Rhythm: Normal rate and regular rhythm  Pulses: Normal pulses  Heart sounds: Normal heart sounds  Pulmonary:      Effort: Pulmonary effort is normal  No respiratory distress  Breath sounds: Normal breath sounds  Abdominal:      Palpations: Abdomen is soft  Tenderness: There is no abdominal tenderness  Musculoskeletal:         General: No tenderness, deformity or signs of injury  Cervical back: Normal range of motion and neck supple  Lymphadenopathy:      Cervical: No cervical adenopathy  Skin:     General: Skin is dry  Coloration: Skin is not jaundiced  Findings: No erythema or rash  Neurological:      General: No focal deficit present  Mental Status: She is alert and oriented to person, place, and time  Mental status is at baseline  Motor: No weakness  Gait: Gait normal    Psychiatric:         Mood and Affect: Mood normal          Behavior: Behavior normal          Thought Content:  Thought content normal          Vital Signs  ED Triage Vitals [05/20/23 1204]   Temperature Pulse Respirations Blood Pressure SpO2   98 1 °F (36 7 °C) 75 14 129/81 99 %      Temp Source Heart Rate Source Patient Position - Orthostatic VS BP Location FiO2 (%)   Oral Monitor Sitting Right arm --      Pain Score       1           Vitals:    05/20/23 1204   BP: 129/81   Pulse: 75   Patient Position - Orthostatic VS: Sitting         Visual Acuity      ED Medications  Medications   iohexol (OMNIPAQUE) 350 MG/ML injection (SINGLE-DOSE) 85 mL (85 mL Intravenous Given 5/20/23 1420)       Diagnostic Studies  Results Reviewed     Procedure Component Value Units Date/Time    Basic metabolic panel [[id number]] Collected: 05/20/23 1335    Lab Status: Final result Specimen: Blood from Arm, Left Updated: 05/20/23 1409     Sodium 137 mmol/L      Potassium 4 0 mmol/L      Chloride 104 mmol/L      CO2 26 mmol/L      ANION GAP 7 mmol/L      BUN 8 mg/dL      Creatinine 0 69 mg/dL      Glucose 84 mg/dL      Calcium 9 1 mg/dL      eGFR 121 ml/min/1 73sq m     Narrative:      [place] guidelines for Chronic Kidney Disease (CKD):   •  Stage 1 with normal or high GFR (GFR > 90 mL/min/1 73 square meters)  •  Stage 2 Mild CKD (GFR = 60-89 mL/min/1 73 square meters)  •  Stage 3A Moderate CKD (GFR = 45-59 mL/min/1 73 square meters)  •  Stage 3B Moderate CKD (GFR = 30-44 mL/min/1 73 square meters)  •  Stage 4 Severe CKD (GFR = 15-29 mL/min/1 73 square meters)  •  Stage 5 End Stage CKD (GFR <15 mL/min/1 73 square meters)  Note: GFR calculation is accurate only with a steady state creatinine    POCT pregnancy, urine [[id number]]  (Normal) Resulted: 05/20/23 1348    Lab Status: Final result Updated: 05/20/23 1348     EXT Preg Test, Ur Negative     Control Valid    CBC and differential [[id number]]  (Abnormal) Collected: 05/20/23 1335    Lab Status: Final result Specimen: Blood from Arm, Left Updated: 05/20/23 1344     WBC 8 46 Thousand/uL      RBC 4 57 Million/uL      Hemoglobin 12 9 g/dL      Hematocrit 39 3 %      MCV 86 fL      MCH 28 2 pg      MCHC 32 8 g/dL      RDW 13 0 %      MPV 10 3 fL      Platelets 605 Thousands/uL      nRBC 0 /100 WBCs      Neutrophils Relative 79 %      Immat GRANS % 0 %      Lymphocytes Relative 14 %      Monocytes Relative 6 %      Eosinophils Relative 1 %      Basophils Relative 0 %      Neutrophils Absolute 6 57 Thousands/µL      Immature Grans Absolute 0 02 Thousand/uL      Lymphocytes Absolute 1 20 Thousands/µL      Monocytes Absolute 0 53 Thousand/µL      Eosinophils Absolute 0 11 Thousand/µL      Basophils Absolute 0 03 Thousands/µL                  CT soft tissue neck with contrast   Final Result by Manjeet Caballero DO (05/20 8259)      Left-sided facial soft tissue swelling in the subcutaneous tissues with thickening of the left platysma and soft tissue prominence along the buccal surface of the left maxilla is likely infectious with odontogenic cause is most likely  No abscess is    identified  Workstation performed: [id number]                    Procedures  Procedures         ED Course  ED Course as of 05/20/23 1459   Sat May 20, 2023   1350 CBC and differential(!)  No leukocytosis, anemia  1350 PREGNANCY TEST URINE: Negative   4564 Basic metabolic panel  No electrolyte abnormalities, ALEX  1443 CT soft tissue neck with contrast  IMPRESSION:     Left-sided facial soft tissue swelling in the subcutaneous tissues with thickening of the left platysma and soft tissue prominence along the buccal surface of the left maxilla is likely infectious with odontogenic cause is most likely  No abscess is   identified  Medical Decision Making  Patient is a 24year old female presenting for evaluation of left sided facial swelling for 1 day  She had left upper dental pain yesterday which has since resolved  No fevers or other associated symptoms  All vitals stable on arrival to the ED  Physical exam is remarkable for diffuse left cheek swelling with mild TTP  No obvious areas of fluctuance  DDx including but not limited to dental infection, dental abscess, sialolithiasis, sialoadenitis, parotitis, facial cellulitis, PTA/retropharyngeal abscess  Will order basic labs and obtain CT soft tissue neck with contrast to rule out abscess  Labs negative for leukocytosis, anemia, electrolyte abnormalities, ALEX  Pregnancy test negative  CT shows left-sided thickening of the left platysma and soft tissue prominence along buccal surface of left maxilla likely infectious with odontogenic cause  No abscess  Prescribed Augmentin to treat dental infection  Instructed take ibuprofen and Tylenol as needed for pain    Provided illness contact information as needed if symptoms persist   Instructed her to follow-up with PCP  I have discussed findings and plan for discharge with the patient/caregiver  Follow up with the appropriate providers including primary care physician was discussed  Return precautions discussed with patient/caregiver as outlined in AVS  Patient/caregiver verbally expressed understanding  Patient stable at time of discharge and ambulated out of the emergency department  Dental infection: acute illness or injury  Amount and/or Complexity of Data Reviewed  Labs: ordered  Decision-making details documented in ED Course  Radiology: ordered  Decision-making details documented in ED Course  Risk  Prescription drug management  Disposition  Final diagnoses:   Dental infection     Time reflects when diagnosis was documented in both MDM as applicable and the Disposition within this note     Time User Action Codes Description Comment    5/20/2023  2:44 PM Jenifer Gold [K04 7] Dental infection       ED Disposition     ED Disposition   Discharge    Condition   Stable    Date/Time   Sat May 20, 2023  2:44 PM    [address] discharge to home/self care                 Follow-up Information     Follow up With Specialties Details Why Contact Info    EVIN Strickland Nurse Practitioner, Family Medicine Schedule an appointment as soon as possible for a visit   [address] [facility] [id number]  [address] [place] [address] for Oral and Maxillofacial Surgery [place]  Schedule an appointment as soon as possible for a visit   [address] [facility]          Patient's Medications   Discharge Prescriptions    AMOXICILLIN-CLAVULANATE (AUGMENTIN) 875-125 MG PER TABLET    Take 1 tablet by mouth every 12 (twelve) hours for 10 days       Start Date: 5/20/2023 End Date: 5/30/2023       Order Dose: 1 tablet       Quantity: 20 tablet    Refills: 0       No discharge procedures on file     PDMP Review     None          ED Provider  Electronically Signed by           Babs Meyer PA-C  05/20/23 8370

## 2023-05-20 NOTE — DISCHARGE INSTRUCTIONS
Take antibiotics as prescribed  Can use ibuprofen and tylenol as needed for pain   Follow up with PCP and dentist if symptoms persist

## 2023-05-20 NOTE — Clinical Note
Kyleigh Orr was seen and treated in our emergency department on 5/20/2023  Diagnosis:     Mis Albrecht  may return to work on return date  She may return on this date: 05/21/2023         If you have any questions or concerns, please don't hesitate to call        Clinton Ann PA-C    ______________________________           _______________          _______________  Hospital Representative                              Date                                Time

## 2023-05-25 ENCOUNTER — TELEPHONE (OUTPATIENT)
Dept: UROLOGY | Facility: CLINIC | Age: 28
End: 2023-05-25

## 2023-05-25 ENCOUNTER — OFFICE VISIT (OUTPATIENT)
Dept: UROLOGY | Facility: CLINIC | Age: 28
End: 2023-05-25

## 2023-05-25 VITALS
OXYGEN SATURATION: 96 % | HEIGHT: 65 IN | WEIGHT: 196 LBS | HEART RATE: 62 BPM | DIASTOLIC BLOOD PRESSURE: 70 MMHG | SYSTOLIC BLOOD PRESSURE: 118 MMHG | BODY MASS INDEX: 32.65 KG/M2

## 2023-05-25 DIAGNOSIS — Q89.9 UMBILICAL ABNORMALITY: Primary | ICD-10-CM

## 2023-05-25 PROCEDURE — 99203 OFFICE O/P NEW LOW 30 MIN: CPT | Performed by: PHYSICIAN ASSISTANT

## 2023-05-25 NOTE — PROGRESS NOTES
5/25/2023      Chief Complaint   Patient presents with   • New Patient Visit     Recent Hernia surgery         Assessment and Plan    24 y o  female managed by NEW PATIENT    1  Umbilical abnormality    Underwent resection of umbilical abscess/tissue  For few months had seen few instances of umbilical drainage/discharge  MRI pelvis w/wo beginning at umbilicus to below is strongly recommended to exclude urachal remnant as these carry higher than usual incidence of urothelial malignancy  She is well healed from her abdominal surgical procedure and ready to proceed with MRI pelvis  History of Present Illness  Keshia Palomo is a 24 y o  female here for evaluation of abnormal CT scan for focal pain and swelling at the umbilicus  Imaging suggested a [id number] fluid collection and fascial defect consistent with hernia  She underwent excisional debridement of the purulent drainage and necrotic tissue along with hernia repair  She is here today for followup to discuss additional imaging to rule out urachal cystic component  She has no voiding complaints hematuria dysuria uti's  She has no other known congenital abnormalities or defects  She was not born prematurely in fact she reports was born at 41 5 weeks gestation  Review of Systems   Constitutional: Negative for activity change, appetite change, chills, fever and unexpected weight change  HENT: Negative  Respiratory: Negative  Negative for shortness of breath  Cardiovascular: Negative  Negative for chest pain  Gastrointestinal: Negative for abdominal pain, diarrhea, nausea and vomiting  Endocrine: Negative  Genitourinary: Negative for decreased urine volume, difficulty urinating, dysuria, flank pain, frequency, hematuria and urgency  Musculoskeletal: Negative for back pain and gait problem  Skin: Negative  Allergic/Immunologic: Negative  Neurological: Negative  Hematological: Negative for adenopathy   Does not bruise/bleed "easily  Vitals  Vitals:    05/25/23 1317   BP: 118/70   BP Location: Right arm   Patient Position: Sitting   Cuff Size: Adult   Pulse: 62   SpO2: 96%   Weight: 88 9 kg (196 lb)   Height: 5' 5\" (1 651 m)       Physical Exam  Vitals and nursing note reviewed  Constitutional:       General: She is not in acute distress  Appearance: She is well-developed  She is not diaphoretic  HENT:      Head: Normocephalic and atraumatic  Pulmonary:      Effort: Pulmonary effort is normal    Abdominal:      Comments: Infraumbilical incision well healed  No residual hernia fat sac drainage or skin disruption   Skin:     General: Skin is warm and dry  Neurological:      Mental Status: She is alert and oriented to person, place, and time  Gait: Gait normal    Psychiatric:         Speech: Speech normal          Behavior: Behavior normal            Past History  History reviewed  No pertinent past medical history    Social History     Socioeconomic History   • Marital status: Single     Spouse name: None   • Number of children: None   • Years of education: None   • Highest education level: None   Occupational History   • None   Tobacco Use   • Smoking status: Never     Passive exposure: Never   • Smokeless tobacco: Never   Vaping Use   • Vaping Use: Some days   • Substances: Nicotine   Substance and Sexual Activity   • Alcohol use: Yes     Comment: occasionally   • Drug use: No   • Sexual activity: None   Other Topics Concern   • None   Social History Narrative    No Taoist pref      Social Determinants of Health     Financial Resource Strain: Not on file   Food Insecurity: Not on file   Transportation Needs: Not on file   Physical Activity: Not on file   Stress: Not on file   Social Connections: Not on file   Intimate Partner Violence: Not on file   Housing Stability: Not on file     Social History     Tobacco Use   Smoking Status Never   • Passive exposure: Never   Smokeless Tobacco Never     Family " "History   Problem Relation Age of Onset   • Hypertension Mother    • Diabetes Family    • Hypertension Family    • Cancer Family        The following portions of the patient's history were reviewed and updated as appropriate: allergies, current medications, past medical history, past social history, past surgical history and problem list     Results  No results found for this or any previous visit (from the past 1 hour(s))  ]  No results found for: \"PSA\"  Lab Results   Component Value Date    BUN 8 05/20/2023    CALCIUM 9 1 05/20/2023     05/20/2023    CO2 26 05/20/2023    CREATININE 0 69 05/20/2023    K 4 0 05/20/2023     Lab Results   Component Value Date    HCT 39 3 05/20/2023    HGB 12 9 05/20/2023    MCV 86 05/20/2023     05/20/2023    WBC 8 46 05/20/2023       "

## 2023-05-25 NOTE — TELEPHONE ENCOUNTER
Patient called that her e-check in states that she would need to pay   Prepay due: 159 00     But she is not able to pay due to she does not get paid until next week  She is not sure if she can keep the appointment or not  Please give her a call back

## 2023-06-08 ENCOUNTER — HOSPITAL ENCOUNTER (OUTPATIENT)
Facility: MEDICAL CENTER | Age: 28
Discharge: HOME | End: 2023-06-08

## 2023-06-08 DIAGNOSIS — Q89.9 UMBILICAL ABNORMALITY: ICD-10-CM

## 2023-06-08 PROCEDURE — 72197 MRI PELVIS W/O & W/DYE: CPT

## 2023-06-08 PROCEDURE — G1004 CDSM NDSC: HCPCS

## 2023-06-08 PROCEDURE — A9585 GADOBUTROL INJECTION: HCPCS | Performed by: PHYSICIAN ASSISTANT

## 2023-06-08 RX ADMIN — GADOBUTROL 8 ML: 604.72 INJECTION INTRAVENOUS at 16:46

## 2023-06-16 ENCOUNTER — TELEPHONE (OUTPATIENT)
Dept: UROLOGY | Facility: CLINIC | Age: 28
End: 2023-06-16

## 2023-06-16 NOTE — TELEPHONE ENCOUNTER
Please let Patrice Rivero know the MRI umbilicus/pelvis is all normal  Fortunately there is no urachal remnant/cyst or other mass or concern  The previous fluid collection around the bellybutton that general surgery excised has fully healed as well  She needs no URO followup   I will CC her surgeon as well as Maximilian Li

## 2023-10-05 ENCOUNTER — HOSPITAL ENCOUNTER (EMERGENCY)
Facility: HOSPITAL | Age: 28
Discharge: HOME | End: 2023-10-06
Attending: EMERGENCY MEDICINE
Payer: COMMERCIAL

## 2023-10-05 ENCOUNTER — APPOINTMENT (EMERGENCY)
Dept: RADIOLOGY | Facility: HOSPITAL | Age: 28
End: 2023-10-05
Payer: COMMERCIAL

## 2023-10-05 ENCOUNTER — APPOINTMENT (EMERGENCY)
Dept: CT IMAGING | Facility: HOSPITAL | Age: 28
End: 2023-10-05
Payer: COMMERCIAL

## 2023-10-05 VITALS
RESPIRATION RATE: 16 BRPM | OXYGEN SATURATION: 100 % | WEIGHT: 185.85 LBS | TEMPERATURE: 98.3 F | BODY MASS INDEX: 30.93 KG/M2 | DIASTOLIC BLOOD PRESSURE: 85 MMHG | SYSTOLIC BLOOD PRESSURE: 141 MMHG | HEART RATE: 94 BPM
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
## 2023-10-05 DIAGNOSIS — S09.90XA INJURY OF HEAD, INITIAL ENCOUNTER: ICD-10-CM

## 2023-10-05 LAB
EXT PREGNANCY TEST URINE: NEGATIVE
EXT. CONTROL: NORMAL

## 2023-10-05 PROCEDURE — 99284 EMERGENCY DEPT VISIT MOD MDM: CPT

## 2023-10-05 PROCEDURE — 72125 CT NECK SPINE W/O DYE: CPT

## 2023-10-05 PROCEDURE — 70486 CT MAXILLOFACIAL W/O DYE: CPT

## 2023-10-05 PROCEDURE — 81025 URINE PREGNANCY TEST: CPT | Performed by: PHYSICIAN ASSISTANT

## 2023-10-05 PROCEDURE — 73110 X-RAY EXAM OF WRIST: CPT

## 2023-10-05 PROCEDURE — 70450 CT HEAD/BRAIN W/O DYE: CPT

## 2023-10-05 PROCEDURE — G1004 CDSM NDSC: HCPCS

## 2023-10-05 PROCEDURE — 99284 EMERGENCY DEPT VISIT MOD MDM: CPT | Performed by: PHYSICIAN ASSISTANT

## 2023-10-05 PROCEDURE — 73140 X-RAY EXAM OF FINGER(S): CPT

## 2023-10-05 RX ORDER — ACETAMINOPHEN 325 MG/1
650 TABLET ORAL ONCE
Status: COMPLETED | OUTPATIENT
Start: 2023-10-05 | End: 2023-10-05

## 2023-10-05 RX ADMIN — ACETAMINOPHEN 325MG 650 MG: 325 TABLET ORAL at 21:38

## 2023-10-05 NOTE — Clinical Note
Endy Padilla was seen and treated in our emergency department on 10/5/2023. Diagnosis:     Lillian Stewart  may return to work on return date. She may return on this date: 10/09/2023         If you have any questions or concerns, please don't hesitate to call.       Ralph Lopez PA-C    ______________________________           _______________          _______________  Hospital Representative                              Date                                Time

## 2023-10-05 NOTE — Clinical Note
Andree Peralta was seen and treated in our emergency department on 10/5/2023. Diagnosis:     Will Eddy  may return to work on return date. She may return on this date: 10/09/2023         If you have any questions or concerns, please don't hesitate to call.       Rosa M Jimenez PA-C    ______________________________           _______________          _______________  Hospital Representative                              Date                                Time

## 2024-02-03 ENCOUNTER — APPOINTMENT (OUTPATIENT)
Dept: RADIOLOGY | Age: 29
End: 2024-02-03
Payer: OTHER MISCELLANEOUS

## 2024-02-03 ENCOUNTER — APPOINTMENT (OUTPATIENT)
Dept: URGENT CARE | Age: 29
End: 2024-02-03
Payer: OTHER MISCELLANEOUS

## 2024-02-03 DIAGNOSIS — M79.672 LEFT FOOT PAIN: ICD-10-CM

## 2024-02-03 PROCEDURE — 73610 X-RAY EXAM OF ANKLE: CPT

## 2024-02-03 PROCEDURE — 73630 X-RAY EXAM OF FOOT: CPT

## 2024-02-03 PROCEDURE — G0382 LEV 3 HOSP TYPE B ED VISIT: HCPCS | Performed by: EMERGENCY MEDICINE

## 2024-02-03 PROCEDURE — 99283 EMERGENCY DEPT VISIT LOW MDM: CPT | Performed by: EMERGENCY MEDICINE

## 2024-02-07 ENCOUNTER — APPOINTMENT (EMERGENCY)
Dept: RADIOLOGY | Facility: HOSPITAL | Age: 29
End: 2024-02-07

## 2024-02-07 ENCOUNTER — HOSPITAL ENCOUNTER (EMERGENCY)
Facility: HOSPITAL | Age: 29
Discharge: HOME | End: 2024-02-07
Attending: EMERGENCY MEDICINE

## 2024-02-07 VITALS
HEART RATE: 69 BPM | OXYGEN SATURATION: 100 % | BODY MASS INDEX: 29.57 KG/M2 | WEIGHT: 177.47 LBS | HEIGHT: 65 IN | SYSTOLIC BLOOD PRESSURE: 137 MMHG | DIASTOLIC BLOOD PRESSURE: 78 MMHG | RESPIRATION RATE: 18 BRPM | TEMPERATURE: 97.7 F

## 2024-02-07 DIAGNOSIS — M79.672 LEFT FOOT PAIN: Primary | ICD-10-CM

## 2024-02-07 PROCEDURE — 73630 X-RAY EXAM OF FOOT: CPT

## 2024-02-07 PROCEDURE — 99283 EMERGENCY DEPT VISIT LOW MDM: CPT

## 2024-02-07 RX ORDER — ACETAMINOPHEN 325 MG/1
975 TABLET ORAL ONCE
Status: COMPLETED | OUTPATIENT
Start: 2024-02-07 | End: 2024-02-07

## 2024-02-07 RX ORDER — ACETAMINOPHEN 500 MG
500 TABLET ORAL EVERY 6 HOURS PRN
Qty: 30 TABLET | Refills: 0 | Status: SHIPPED | OUTPATIENT
Start: 2024-02-07

## 2024-02-07 RX ADMIN — ACETAMINOPHEN 325MG 975 MG: 325 TABLET ORAL at 22:10

## 2024-02-07 NOTE — Clinical Note
Mercedes Lynne was seen and treated in our emergency department on 2/7/2024.        No work until cleared by Family Doctor/Orthopedics        Diagnosis:     Mercedes  .    She may return on this date:          If you have any questions or concerns, please don't hesitate to call.      Lucy Linares PA-C    ______________________________           _______________          _______________  Hospital Representative                              Date                                Time

## 2024-02-08 NOTE — ED PROVIDER NOTES
History  Chief Complaint   Patient presents with    Foot Pain     Pt states she rolled her left ankle last week at work, was told at urgent care her ankle was sprained. Now c/o left sided footpain, states urgent care never checked her foot and it isnt getting any better     The patient is a 28 YOF who presents to the ED for evaluation of left foot pain.The patient reports that last week, she inverted her ankle while at work and was evaluated at an Urgent Care. At that time, she was experiencing ankle pain, and had an XR of the ankle which was without fracture. Since, she has developed pain, swelling, and bruising in the foot. She returned to work immediately following this injury and has been on the foot daily since. She has not taken anything for symptoms. Reports occasional paresthesias in the 5th toe. Denies knee pain, other injury.         Prior to Admission Medications   Prescriptions Last Dose Informant Patient Reported? Taking?   acetaminophen (TYLENOL) 325 mg tablet  Self Yes No   Sig: Take 650 mg by mouth every 6 (six) hours as needed for moderate pain. Indications: Pain      Facility-Administered Medications: None       History reviewed. No pertinent past medical history.    Past Surgical History:   Procedure Laterality Date    HERNIA REPAIR      WOUND DEBRIDEMENT N/A 04/25/2023    Procedure: EXCISIONAL DEBRIDEMENT UMBILICAL HERNIA;  Surgeon: Octavia Shetty MD;  Location: [facility] OR;  Service: General       Family History   Problem Relation Age of Onset    Hypertension Mother     Diabetes Family     Hypertension Family     Cancer Family      I have reviewed and agree with the history as documented.    E-Cigarette/Vaping    E-Cigarette Use Former User      E-Cigarette/Vaping Substances    Nicotine Yes      Social History     Tobacco Use    Smoking status: Never     Passive exposure: Never    Smokeless tobacco: Never   Vaping Use    Vaping status: Former    Substances: Nicotine   Substance Use Topics     Alcohol use: Yes     Comment: occasionally    Drug use: Yes     Types: Marijuana       Review of Systems   Constitutional:  Negative for chills and fever.   Respiratory:  Negative for cough and shortness of breath.    Cardiovascular:  Negative for chest pain and leg swelling.   Musculoskeletal:  Positive for arthralgias and joint swelling. Negative for myalgias.   Skin:  Negative for rash and wound.   Neurological:  Negative for weakness, numbness and headaches.       Physical Exam  Physical Exam  Vitals and nursing note reviewed.   Constitutional:       General: She is not in acute distress.     Appearance: She is well-developed.   HENT:      Head: Normocephalic and atraumatic.   Eyes:      Conjunctiva/sclera: Conjunctivae normal.   Cardiovascular:      Rate and Rhythm: Normal rate and regular rhythm.      Pulses: Normal pulses.   Pulmonary:      Effort: Pulmonary effort is normal. No respiratory distress.   Abdominal:      Palpations: Abdomen is soft.      Tenderness: There is no abdominal tenderness.   Musculoskeletal:         General: No swelling.      Cervical back: Neck supple.      Left knee: No bony tenderness. Normal range of motion. No tenderness.      Left lower leg: No tenderness or bony tenderness.      Left ankle: Tenderness present over the ATF ligament and base of 5th metatarsal. Normal range of motion. Normal pulse.      Left Achilles Tendon: Normal. No tenderness or defects. Webb's test negative.      Left foot: Normal range of motion and normal capillary refill. Swelling, tenderness and bony tenderness present. Normal pulse.   Skin:     General: Skin is warm and dry.      Capillary Refill: Capillary refill takes less than 2 seconds.      Findings: Ecchymosis (to lateral left foot) present.   Neurological:      Mental Status: She is alert.   Psychiatric:         Mood and Affect: Mood normal.         Vital Signs  ED Triage Vitals   Temperature Pulse Respirations Blood Pressure SpO2   02/07/24  2122 02/07/24 2122 02/07/24 2122 02/07/24 2123 02/07/24 2122   97.7 °F (36.5 °C) 69 18 137/78 100 %      Temp Source Heart Rate Source Patient Position - Orthostatic VS BP Location FiO2 (%)   02/07/24 2122 02/07/24 2122 02/07/24 2122 02/07/24 2122 --   Oral Monitor Sitting Right arm       Pain Score       02/07/24 2210       7           Vitals:    02/07/24 2122 02/07/24 2123   BP:  137/78   Pulse: 69    Patient Position - Orthostatic VS: Sitting          Visual Acuity      ED Medications  Medications   acetaminophen (TYLENOL) tablet 975 mg (975 mg Oral Given 2/7/24 2210)       Diagnostic Studies  Results Reviewed       None                   XR foot 3+ views LEFT   ED Interpretation by Lucy Linares PA-C (02/08 0129)   No obvious fracture or dislocation as interpreted by me                  Procedures  Orthopedic injury treatment    Date/Time: 2/7/2024 10:30 PM    Performed by: Lucy Linares PA-C  Authorized by: Lucy Linares PA-C    Patient Location:  ED  [place] Protocol:  Consent: Verbal consent obtained.  Risks and benefits: risks, benefits and alternatives were discussed  Consent given by: patient  Patient understanding: patient states understanding of the procedure being performed  Patient consent: the patient's understanding of the procedure matches consent given    Injury location:  Ankle  Location details:  Left ankle  Injury type:  Soft tissue  Neurovascular status: Neurovascularly intact    Distal perfusion: normal    Neurological function: normal    Range of motion: normal    Skeletal traction used?: No    Immobilization: Air cast.  Neurovascular status: Neurovascularly intact    Distal perfusion: normal    Neurological function: normal    Range of motion: unchanged    Patient tolerance:  Patient tolerated the procedure well with no immediate complications           ED Course         Medical Decision Making  DDX including but not limited to: contusion, sprain,  strain, fracture, dislocation, tendinitis    Will obtain XR to evaluate for fracture, dislocation     No obvious fracture or dislocation noted. Patient placed in air cast as above. Provided crutches and instructed in crutch use. Will give work note until patient is able to follow up with podiatry/worker's comp.     At the time of discharge, the patient is in no acute distress. I discussed with the patient the diagnosis, treatment plan, follow-up, return precautions, and discharge instructions; they were given the opportunity to ask questions and verbalized understanding.        Problems Addressed:  Left foot pain: acute illness or injury    Amount and/or Complexity of Data Reviewed  External Data Reviewed: labs and notes.  Radiology: ordered and independent interpretation performed. Decision-making details documented in ED Course.    Risk  OTC drugs.             Disposition  Final diagnoses:   Left foot pain     Time reflects when diagnosis was documented in both MDM as applicable and the Disposition within this note       Time User Action Codes Description Comment    2/7/2024 10:29 PM Lucy Linares [M79.672] Left foot pain           ED Disposition       ED Disposition   Discharge    Condition   Stable    Date/Time   Wed Feb 7, 2024 10:29 PM    Comment   Mercedes Lynne discharge to home/self care.                   Follow-up Information       Follow up With Specialties Details Why Contact Info Additional Information    [facility]   [address] [place] [id number]  [phone] [facility] [place] [address], [place], [address]   [phone]            Discharge Medication List as of 2/7/2024 10:31 PM        START taking these medications    Details   !! acetaminophen (TYLENOL) 500 mg tablet Take 1 tablet (500 mg total) by mouth every 6 (six) hours as needed for mild pain or moderate pain, Starting Wed 2/7/2024,  Normal       !! - Potential duplicate medications found. Please discuss with provider.        CONTINUE these medications which have NOT CHANGED    Details   !! acetaminophen (TYLENOL) 325 mg tablet Take 650 mg by mouth every 6 (six) hours as needed for moderate pain. Indications: Pain, Starting Fri 4/28/2023, Historical Med       !! - Potential duplicate medications found. Please discuss with provider.              PDMP Review       None            ED Provider  Electronically Signed by             Lucy Linares PA-C  02/08/24 5836

## 2024-02-08 NOTE — DISCHARGE INSTRUCTIONS
Tylenol as prescribed as needed for pain    Crutches until follow up     Wear air cast until follow up     Follow up with podiatry ASAP as discussed     Apply a compressive ACE bandage. Rest and elevate the affected painful area.  Apply cold compresses intermittently as needed.

## 2024-03-12 ENCOUNTER — OFFICE VISIT (OUTPATIENT)
Dept: PODIATRY | Facility: CLINIC | Age: 29
End: 2024-03-12

## 2024-03-12 VITALS — DIASTOLIC BLOOD PRESSURE: 58 MMHG | SYSTOLIC BLOOD PRESSURE: 100 MMHG | HEART RATE: 68 BPM

## 2024-03-12 DIAGNOSIS — S93.492A SPRAIN OF ANTERIOR TALOFIBULAR LIGAMENT OF LEFT ANKLE, INITIAL ENCOUNTER: Primary | ICD-10-CM

## 2024-03-12 PROCEDURE — 99203 OFFICE O/P NEW LOW 30 MIN: CPT | Performed by: PODIATRIST

## 2024-03-12 NOTE — LETTER
March 12, 2024     Patient: Mercedes Lynne  YOB: 1995  Date of Visit: 3/12/2024      To Whom it May Concern:    Mercedes Lynne is under my professional care. Mercedes was seen in my office on 3/12/2024. Mercedes has moderate left foot and ankle sprain.   Light duty for 4 weeks  No lifting over 10 pounds  No standing more than 15 minutes per hour  No squatting, climbing, or jumping  Hopeful lift of restrictions in 4 weeks.    If you have any questions or concerns, please don't hesitate to call.         Sincerely,          Jermain Patricio DPM        CC: No Recipients

## 2024-03-12 NOTE — PROGRESS NOTES
Assessment/Plan:       Diagnoses and all orders for this visit:    Sprain of anterior talofibular ligament of left ankle, initial encounter  -     Ambulatory Referral to Podiatry  -     Ambulatory referral to Physical Therapy; Future      Diagnosis and options discussed with patient  Patient agreeable to the plan as stated below  Reviewed both XRs from urgent care. No fracture    RICE protocol  Physicla therapy  Light duty 4 weeks  RTC 4 weeks    Subjective:      Patient ID: Mercedes Lynne is a 28 y.o. female.    Patient rolled her left ankle 3/1/2024 while at work. Two days later it was very painful so she went to urgent care. An initial XR way fine. She went back to the ED a few days later for another foot XR due to increased foot pain, again no fracture was seen. She was given an aircast and crutches and told to rest and f/u outpt.     There is still significant pain but it is a little better than the first few days.         The following portions of the patient's history were reviewed and updated as appropriate: allergies, current medications, past family history, past medical history, past social history, past surgical history, and problem list.    Review of Systems    As stated in HPI, otherwise normal      Objective:      /58 (BP Location: Right arm, Patient Position: Sitting, Cuff Size: Standard)   Pulse 68          Physical Exam  Vitals reviewed.   Constitutional:       Appearance: She is not ill-appearing or diaphoretic.   Cardiovascular:      Rate and Rhythm: Normal rate.      Pulses: Normal pulses.   Pulmonary:      Effort: Pulmonary effort is normal. No respiratory distress.   Musculoskeletal:      Left ankle: Swelling present. No deformity, ecchymosis or lacerations. Tenderness present over the ATF ligament and CF ligament. No posterior TF ligament, base of 5th metatarsal or proximal fibula tenderness. Normal range of motion. Anterior drawer test negative. Normal pulse.      Left Achilles  Tendon: No tenderness or defects. Webb's test negative.   Skin:     Capillary Refill: Capillary refill takes less than 2 seconds.      Findings: No erythema or rash.   Neurological:      Mental Status: She is alert and oriented to person, place, and time.      Sensory: No sensory deficit.      Gait: Gait normal.   Psychiatric:         Mood and Affect: Mood normal.       XRay 3 views of the left foot and ankle personally read by Dr. Patricio in office today and discussed with patient:    There is no acute fracture or dislocation.  No significant degenerative changes.  No lytic or blastic osseous lesion.  Soft tissues are unremarkable.

## 2024-03-12 NOTE — PATIENT INSTRUCTIONS
Ankle Exercises   AMBULATORY CARE:   What you need to know about ankle exercises:  Ankle exercises help strengthen your ankle and improve its function after injury. These are beginning exercises. Ask your healthcare provider if you need to see a physical therapist for more advanced exercises.   General guidelines for ankle exercises:   Do these exercises 3 to 5 days a week, or as directed by your healthcare provider.  Ask if you should do the exercises on each ankle.    Do the exercises in the order that your healthcare provider recommends.  This will help prevent swelling, chronic pain, and reinjury. Start with range of motion exercises. Then move to strengthening exercises, and finally to balancing exercises.    Warm up before you do ankle exercises.  Walk or ride a stationary bike for 5 to 10 minutes to prepare your ankle for movement.    Stop if you feel pain.  It is normal to feel some discomfort at first but you should not feel pain. Tell your doctor or physical therapist if you have pain while you exercise. Regular exercise will help decrease your discomfort over time.    How to perform range of motion exercises safely:  Begin with range of motion exercises to improve flexibility. Ask your healthcare provider when you can progress to strengthening exercises.  Ankle alphabet:  Sit on a chair so that your feet do not touch the floor. Use your big toe to write each letter of the alphabet. Use only your foot and ankle, and keep your movements small. Do 2 sets.         Calf stretches:      Sitting calf stretches with a towel:  Sit on the floor with both legs out straight in front of you. Loop a towel around the ball of your injured foot. Grasp the ends of the towel and pull it toward you. Keep your leg and back straight. Do not lean forward as you pull the towel. Hold for 30 seconds. Then relax for 30 seconds. Do 2 sets of 10.         Standing calf stretches:  Stand facing a wall with the foot that is not injured  forward and your knee slightly bent. Keep the leg with the injured foot straight and behind you with your toes pointed in slightly. With both heels flat on the floor, press your hips forward. Do not arch your back. Hold for 30 seconds, and then relax for 30 seconds. Do 2 sets of 10. Repeat with your leg bent. Do 2 sets of 10.       How to perform strengthening exercises safely:  After you can perform range of motion exercises without pain, you may begin strengthening exercises. Ask your healthcare provider when you can progress to balancing exercises.  Ankle movement in 4 directions:  Sit on the floor with your legs straight in front of you. Keep your heels on the floor for support.    Dorsiflexion:  Begin with your toes pointing straight up. Pull your toes toward your body. Slowly return to the starting position. Do 3 sets of 5.     Plantar flexion:  Begin with your toes pointing straight up. Push your toes away from your body. Slowly return to the starting position. Do 3 sets of 5.         Inversion:  Begin with your toes pointing straight up. Push your toes inward, toward each other. Slowly return to the starting position. Do 3 sets of 5.     Eversion:  Begin with your toes pointing straight up. Push your toes outward, away from each other. Slowly return to the starting position. Do 3 sets of 5.       Toe curls with a towel:  Sit on a chair so that both of your feet are flat on the floor. Place a small towel on the floor in front of your injured foot. Grab the center of the towel with your toes and curl the towel toward you. Relax and repeat. Do 1 set of 5.         [place] pick-ups:  Sit on a chair so that both of your feet are flat on the floor. Place 20 marbles on the floor in front of your injured foot. Use your toes to  one marble at a time and place it into a bowl. Repeat until you have picked up all the marbles. Do 1 set.     Heel raises:      Single leg heel raises:  Stand with your weight evenly on  both feet. Hold on to a chair or a wall for balance. Lift the foot that is not injured off the floor so all your weight is placed on your injured foot. Raise the heel of your injured foot as high as you can. Slowly lower your heel to the floor. Do 1 set of 10.         Double leg heel raises:  Stand with your weight evenly on both feet. Hold on to a chair or a wall for balance. Raise both of your heels as high as you can. Slowly lower your heels to the floor. Do 1 set of 10.       Heel and toe walks:      Heel walks:  Begin in a standing position. Lift your toes off the floor and walk on your heels. Keep your toes lifted as high as possible. Do 2 sets of 10.         Toe walks:  Begin in a standing position. Lift your heels off the floor and walk on the balls and toes of your feet. Keep your heels lifted as high as possible. Do 2 sets of 10.       How to perform a balance exercise safely:  After you can perform strengthening exercises without pain, you may do this beginning balancing exercise. Ask your healthcare provider for more advanced balance exercises.  Single leg stance:  Stand with your weight evenly on both feet, or hold on to a chair or a wall. Do not lean to the side. Lift the foot that is not injured off the floor so all your weight is placed on your injured foot. Balance on your injured foot. Ask your healthcare provider how long to hold this position.           Call your doctor or physical therapist if:   You have new pain, or your pain becomes worse.    You have questions or concerns about your condition, care, or exercise program.    © Copyright [employer] 2023 Information is for End User's use only and may not be sold, redistributed or otherwise used for commercial purposes.  The above information is an  only. It is not intended as medical advice for individual conditions or treatments. Talk to your doctor, nurse or pharmacist before following any medical regimen to see if it is safe and  effective for you.    RICE Therapy for Routine Care of Injuries  Many injuries can be cared for with rest, ice, compression, and elevation (RICE therapy). This includes:  · Resting the injured part.  · Putting ice on the injury.  · Putting pressure (compression) on the injury.  · Raising the injured part (elevation).  Using RICE therapy can help to lessen pain and swelling.  Supplies needed:  · Ice.  · Plastic bag.  · Towel.  · Elastic bandage.  · Pillow or pillows to raise (elevate) your injured body part.  How to care for your injury with RICE therapy  Rest  Limit your normal activities, and try not to use the injured part of your body. You can go back to your normal activities when your doctor says it is okay to do them and you feel okay. Ask your doctor if you should do exercises to help your injury get better.  Ice  Put ice on the injured area. Do not put ice on your bare skin.  · Put ice in a plastic bag.  · Place a towel between your skin and the bag.  · Leave the ice on for 20 minutes, 2-3 times a day. Use ice on as many days as told by your doctor.    Compression  Compression means putting pressure on the injured area. This can be done with an elastic bandage. If an elastic bandage has been put on your injury:  · Do not wrap the bandage too tight. Wrap the bandage more loosely if part of your body away from the bandage is blue, swollen, cold, painful, or loses feeling (gets numb).  · Take off the bandage and put it on again. Do this every 3-4 hours or as told by your doctor.  · See your doctor if the bandage seems to make your problems worse.    Elevation  Elevation means keeping the injured area raised. If you can, raise the injured area above your heart or the center of your chest.  Contact a doctor if:  · You keep having pain and swelling.  · Your symptoms get worse.  Get help right away if:  · You have sudden bad pain at your injury or lower than your injury.  · You have redness or more swelling around  your injury.  · You have tingling or numbness at your injury or lower than your injury, and it does not go away when you take off the bandage.  Summary  · Many injuries can be cared for using rest, ice, compression, and elevation (RICE therapy).  · You can go back to your normal activities when you feel okay and your doctor says it is okay.  · Put ice on the injured area as told by your doctor.  · Get help if your symptoms get worse or if you keep having pain and swelling.

## 2024-03-18 NOTE — PROGRESS NOTES
PT Evaluation     Today's date: 3/19/2024  Patient name: Mercedes Lynne  : 1995  MRN: [id number]  Referring provider: Jermain Patricio D*  Dx:   Encounter Diagnosis     ICD-10-CM    1. Sprain of anterior talofibular ligament of left ankle, initial encounter  S93.492A Ambulatory referral to Physical Therapy                     Assessment  Assessment details: Mercedes Lynne is a pleasant 28 y.o. female who presents with an ankle sprain. Primary movement impairment diagnosis of impaired ROM & strength. Her impairments have lead to participation restrictions in work, walking, yoga, and stairs. She would benefit from working with a skilled PT in order to increase participation in aforementioned participation restrictions.       No further referral appears necessary at this time based upon examination results.    Pt. will benefit from skilled PT services that includes manual therapy techniques to enhance tissue extensibility, neuromuscular re-education to facilitate motor control, therapeutic exercise to increase functional mobility, and modalities prn to reduce pain and inflammation.            Impairments: abnormal gait, abnormal or restricted ROM, abnormal movement, activity intolerance, impaired physical strength, lacks appropriate home exercise program and pain with function    Symptom irritability: lowUnderstanding of Dx/Px/POC: good   Prognosis: good  Prognosis details: Positive prognostic indicators include positive attitude toward recovery and absence of observed red flags.  Negative prognostic indicators include none.      Goals  ST. Independent with HEP in 2 weeks  2. Pt will have verbal report of improvement in symptoms by >/=25% in 2 weeks     To be achieved by D/C   LT. Pt will be able to return to work without restrictions   2. Pt will be able to ascend/descend stairs with no difficulty   3. Pt will be able to return to yoga without restrictions   4. Pt will be able to  ambulate community distances with no difficulty       Plan  Patient would benefit from: skilled physical therapy  Planned therapy interventions: activity modification, manual therapy, motor coordination training, neuromuscular re-education, patient education, self care, therapeutic activities, therapeutic exercise, graded activity, home exercise program, graded exercise, functional ROM exercises and strengthening  Frequency: 2x week  Duration in weeks: 8  Plan of Care beginning date: 3/19/2024  Plan of Care expiration date: 2024  Treatment plan discussed with: patient      Subjective Evaluation    History of Present Illness  Mechanism of injury: Pt reports that on  she stepped off a step at work and landed of the ankle. She went to the ED and was told to stay off her foot until she saw podiatry. She saw podiatry last week and was cleared to do PT. 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 She notes that thing she has the most difficulty with is the push off with her toes.   Patient Goals  Patient goals for therapy: return to work and decreased pain  Patient goal: be able to stairs, be able to perform lifts of about 60-70lbs, be able to go for walks,  Pain  Current pain ratin  At best pain ratin  At worst pain ratin  Location: medial and lateral ankle, and top of foot  Aggravating factors: standing, walking and stair climbing  Progression: improved          Objective     Active Range of Motion   Left Ankle/Foot   Dorsiflexion (ke): -3 degrees   Plantar flexion: 64 degrees with pain  Inversion: 37 degrees with pain  Eversion: 9 degrees with pain  Great toe extension: 26 degrees     Right Ankle/Foot   Dorsiflexion (ke): 2 degrees   Plantar flexion: 80 degrees   Inversion: 50 degrees   Eversion: 15 degrees   Great toe extension: 60 degrees     Passive Range of Motion   Left Ankle/Foot    Dorsiflexion (ke): 0 degrees   Plantar flexion: 70 degrees  with pain  Inversion: 40 degrees   Eversion: 15 degrees   Great toe extension: 30 degrees     Right Ankle/Foot  Normal passive range of motion    Swelling   Left Ankle/Foot   Metatarsal heads: 23 cm  Malleoli: 25 cm    Right Ankle/Foot   Metatarsal heads: 24 cm  Malleoli: 25 cm             Precautions: none       Manuals 3/18            Forefoot mobs             TC mobilizations                                       Neuro Re-Ed             Rocker board A/P, M/L             Standing marching on foam              Tandem ambulation                                                                  Ther Ex             Pt education on HEP, POC 8 min             Bike for ROM             Ankle ABCs HEP            Long sitting calf stretch 3x30s            Ankle 4 way  Otb 2x10 ea            Towel curls              HR/TR seated c DB or KB                          Ther Activity                                       Gait Training                                       Modalities

## 2024-03-19 ENCOUNTER — EVALUATION (OUTPATIENT)
Dept: PHYSICAL THERAPY | Facility: REHABILITATION | Age: 29
End: 2024-03-19
Payer: OTHER MISCELLANEOUS

## 2024-03-19 DIAGNOSIS — S93.492A SPRAIN OF ANTERIOR TALOFIBULAR LIGAMENT OF LEFT ANKLE, INITIAL ENCOUNTER: ICD-10-CM

## 2024-03-19 PROCEDURE — 97110 THERAPEUTIC EXERCISES: CPT | Performed by: PHYSICAL THERAPIST

## 2024-03-19 PROCEDURE — 97161 PT EVAL LOW COMPLEX 20 MIN: CPT | Performed by: PHYSICAL THERAPIST

## 2024-03-21 ENCOUNTER — OFFICE VISIT (OUTPATIENT)
Dept: PHYSICAL THERAPY | Facility: REHABILITATION | Age: 29
End: 2024-03-21
Payer: OTHER MISCELLANEOUS

## 2024-03-21 DIAGNOSIS — S93.492A SPRAIN OF ANTERIOR TALOFIBULAR LIGAMENT OF LEFT ANKLE, INITIAL ENCOUNTER: Primary | ICD-10-CM

## 2024-03-21 PROCEDURE — 97112 NEUROMUSCULAR REEDUCATION: CPT

## 2024-03-21 PROCEDURE — 97140 MANUAL THERAPY 1/> REGIONS: CPT

## 2024-03-21 PROCEDURE — 97110 THERAPEUTIC EXERCISES: CPT

## 2024-03-21 NOTE — PROGRESS NOTES
"Daily Note     Today's date: 3/21/2024  Patient name: Mercedes Lynne  : 1995  MRN: [id number]  Referring provider: Jermain Patricio D*  Dx:   Encounter Diagnosis     ICD-10-CM    1. Sprain of anterior talofibular ligament of left ankle, initial encounter  S93.492A                      Subjective: Pt reports she is doing well, no changes noted.       Objective: See treatment diary below      Assessment: Tolerated treatment well. Pt performed all exercises without issue. Pt performed balance and stability exercises with good control. Pt responded positively to PROM stretching, would benefit from continued focus on stability and strengthening exercises. Patient demonstrated fatigue post treatment, exhibited good technique with therapeutic exercises, and would benefit from continued PT      Plan: Continue per plan of care.      Precautions: none       Manuals 3/18 3/21           Forefoot mobs             TC mobilizations             Ankle PROM  KM                        Neuro Re-Ed             [facility] board A/P, M/L  20x ea           Standing marching on foam   20x ea           Tandem ambulation   4x on foam           SLS  On foam 30\"x3                                                  Ther Ex             Pt education on HEP, POC 8 min             Bike for ROM  5'           Ankle ABCs HEP            Long sitting calf stretch 3x30s 3x30\"           Ankle 4 way  Otb 2x10 ea GTB 2x10 ea           Towel curls              HR/TR seated c DB or KB  15# 20x ea                        Ther Activity                                       Gait Training                                       Modalities                                            "

## 2024-03-22 ENCOUNTER — APPOINTMENT (OUTPATIENT)
Dept: PHYSICAL THERAPY | Facility: REHABILITATION | Age: 29
End: 2024-03-22
Payer: OTHER MISCELLANEOUS

## 2024-03-26 ENCOUNTER — OFFICE VISIT (OUTPATIENT)
Dept: PHYSICAL THERAPY | Facility: REHABILITATION | Age: 29
End: 2024-03-26
Payer: OTHER MISCELLANEOUS

## 2024-03-26 DIAGNOSIS — S93.492A SPRAIN OF ANTERIOR TALOFIBULAR LIGAMENT OF LEFT ANKLE, INITIAL ENCOUNTER: Primary | ICD-10-CM

## 2024-03-26 PROCEDURE — 97110 THERAPEUTIC EXERCISES: CPT | Performed by: PHYSICAL THERAPIST

## 2024-03-26 PROCEDURE — 97140 MANUAL THERAPY 1/> REGIONS: CPT | Performed by: PHYSICAL THERAPIST

## 2024-03-26 PROCEDURE — 97112 NEUROMUSCULAR REEDUCATION: CPT | Performed by: PHYSICAL THERAPIST

## 2024-03-26 NOTE — PROGRESS NOTES
"Daily Note     Today's date: 3/26/2024  Patient name: Mercedes Lynne  : 1995  MRN: [id number]  Referring provider: Jermain Patricio D*  Dx:   Encounter Diagnosis     ICD-10-CM    1. Sprain of anterior talofibular ligament of left ankle, initial encounter  S93.492A                      Subjective: Pt reports that she is feeling much better.       Objective: See treatment diary below      Assessment: Tolerated treatment well. Patient demonstrated fatigue post treatment, exhibited good technique with therapeutic exercises, and would benefit from continued PT. Pt demonstrated improved mobility with TC mobs, added in subtalar mobs as well to improve joint accessory mobility in the rear foot. She was able to perform SLS on compliant surface without the use of Upper extremities can progress balance and proprioceptive activities NV.       Plan: Continue per plan of care.  Progress treatment as tolerated.       Precautions: none       Manuals 3/18 3/21 3/26          Forefoot mobs             TC mobilizations   KB gr 4 + subtalar          Ankle PROM  KM KB                       Neuro Re-Ed             Rocker board A/P, M/L  20x ea 20x ea          Standing marching on foam   20x ea 30x ea          Tandem ambulation   4x on foam 4x on foam          SLS  On foam 30\"x3 On foam 30\"x3          SLS tramp ball toss    NV          Biodex LOS   NV                       Ther Ex             Pt education on HEP, POC 8 min             Bike for ROM  5' 8'          Ankle ABCs HEP            Long sitting calf stretch 3x30s 3x30\" 3x30\"          Ankle 4 way  Otb 2x10 ea GTB 2x10 ea GTB 3x10 ea          Towel curls    2'          HR/TR seated c DB or KB  15# 20x ea 15# 20x ea                       Ther Activity                                       Gait Training                                       Modalities                                              "

## 2024-03-28 ENCOUNTER — OFFICE VISIT (OUTPATIENT)
Dept: PHYSICAL THERAPY | Facility: REHABILITATION | Age: 29
End: 2024-03-28
Payer: OTHER MISCELLANEOUS

## 2024-03-28 DIAGNOSIS — S93.492A SPRAIN OF ANTERIOR TALOFIBULAR LIGAMENT OF LEFT ANKLE, INITIAL ENCOUNTER: Primary | ICD-10-CM

## 2024-03-28 PROCEDURE — 97112 NEUROMUSCULAR REEDUCATION: CPT

## 2024-03-28 PROCEDURE — 97140 MANUAL THERAPY 1/> REGIONS: CPT

## 2024-03-28 PROCEDURE — 97110 THERAPEUTIC EXERCISES: CPT

## 2024-03-28 NOTE — PROGRESS NOTES
"Daily Note     Today's date: 3/28/2024  Patient name: Mercedes Lynne  : 1995  MRN: [id number]  Referring provider: Jermain Patricio D*  Dx:   Encounter Diagnosis     ICD-10-CM    1. Sprain of anterior talofibular ligament of left ankle, initial encounter  S93.492A             Start Time: 1120  Stop Time: 1205  Total time in clinic (min): 45 minutes    Subjective: Patient reports improvements in L ankle tightness and discomfort after last session, but noted onset of symptoms later that night when the soreness set in. Patient notes difficulties with being on her feet for a prolonged period of time.      Objective: See treatment diary below      Assessment: Tolerated treatment well. Patient remains moderately challenged with active L ankle inversion and eversion due to increased weakness and instability while moving into these planes of motions. Added Biodex LOS exercise to promote improvements in ankle stability and overall balance, with no adverse response noted by patient. Patient required verbal cues to properly shift her weight in order to reach desired targets, with good carryover noted. Continue to progress balance and ankle stabilization exercises next visit if tolerable for patient. Patient demonstrated fatigue post treatment, exhibited good technique with therapeutic exercises, and would benefit from continued PT.     Plan: Continue per plan of care.  Progress treatment as tolerated.       Precautions: none       Manuals 3/18 3/21 3/26 3/28         Forefoot mobs             TC mobilizations   KB gr 4 + subtalar          Ankle PROM  KM KB KR                      Neuro Re-Ed             [facility] board A/P, M/L  20x ea 20x ea 20x ea         Standing marching on foam   20x ea 30x ea 30x ea         Tandem ambulation   4x on foam 4x on foam 4x on foam         SLS  On foam 30\"x3 On foam 30\"x3 On foam 30\"x3         SLS tramp ball toss    NV nv         Biodex LOS   NV LOS static lvl easy/med 3x          " "            Ther Ex             Pt education on HEP, POC 8 min             Bike for ROM  5' 8' 8'         Ankle ABCs HEP            Long sitting calf stretch 3x30s 3x30\" 3x30\" 3x30\"         Ankle 4 way  Otb 2x10 ea GTB 2x10 ea GTB 3x10 ea GTB 3x10 ea         Towel curls    2' 2'         HR/TR seated c DB or KB  15# 20x ea 15# 20x ea 15# 20x ea                      Ther Activity                                       Gait Training                                       Modalities                                              "

## 2024-04-02 ENCOUNTER — OFFICE VISIT (OUTPATIENT)
Dept: PHYSICAL THERAPY | Facility: REHABILITATION | Age: 29
End: 2024-04-02
Payer: OTHER MISCELLANEOUS

## 2024-04-02 DIAGNOSIS — S93.492A SPRAIN OF ANTERIOR TALOFIBULAR LIGAMENT OF LEFT ANKLE, INITIAL ENCOUNTER: Primary | ICD-10-CM

## 2024-04-02 PROCEDURE — 97112 NEUROMUSCULAR REEDUCATION: CPT

## 2024-04-02 PROCEDURE — 97110 THERAPEUTIC EXERCISES: CPT

## 2024-04-02 NOTE — PROGRESS NOTES
"Daily Note     Today's date: 2024  Patient name: Mercedes Lynne  : 1995  MRN: [id number]  Referring provider: Jermain Patricio D*  Dx:   Encounter Diagnosis     ICD-10-CM    1. Sprain of anterior talofibular ligament of left ankle, initial encounter  S93.492A           Start Time: 0940  Stop Time: 1025  Total time in clinic (min): 45 minutes    Subjective: Patient reports improvements but continues to have lateral dorsal discomfort martine with toe extension and ambulation.       Objective: See treatment diary below      Assessment: Patient tolerated treatment well. SLS rebounded added today without complaints of pain just fatigue. Pt will benefit from continued skilled PT intervention in order to address remaining limitations and to restore maximal function. No increased pain reported post treatment.         Plan: Continue per plan of care.      Precautions: none       Manuals 3/18 3/21 3/26 3/28 4        Forefoot mobs             TC mobilizations   KB gr 4 + subtalar          Ankle PROM  KM KB KR JG                      Neuro Re-Ed             Rocker board A/P, M/L  20x ea 20x ea 20x ea 20x        Standing marching on foam   20x ea 30x ea 30x ea 30x ea        Tandem ambulation   4x on foam 4x on foam 4x on foam 4x on foam        SLS  On foam 30\"x3 On foam 30\"x3 On foam 30\"x3 On foam 30\"x3        SLS tramp ball toss    NV nv 2x10        Biodex LOS   NV LOS static lvl easy/med 3x LOS static lvl easy/med 3x                     Ther Ex             Pt education on HEP, POC 8 min             Bike for ROM  5' 8' 8' 8'        Ankle ABCs HEP            Long sitting calf stretch 3x30s 3x30\" 3x30\" 3x30\" 3x30\"        Ankle 4 way  Otb 2x10 ea GTB 2x10 ea GTB 3x10 ea GTB 3x10 ea GTB 3x10 ea        Towel curls    2' 2' 2'        HR/TR seated c DB or KB  15# 20x ea 15# 20x ea 15# 20x ea 15# 20x ea                     Ther Activity                                       Gait Training                               "         Modalities

## 2024-04-04 ENCOUNTER — OFFICE VISIT (OUTPATIENT)
Dept: PHYSICAL THERAPY | Facility: REHABILITATION | Age: 29
End: 2024-04-04
Payer: OTHER MISCELLANEOUS

## 2024-04-04 DIAGNOSIS — S93.492A SPRAIN OF ANTERIOR TALOFIBULAR LIGAMENT OF LEFT ANKLE, INITIAL ENCOUNTER: Primary | ICD-10-CM

## 2024-04-04 PROCEDURE — 97112 NEUROMUSCULAR REEDUCATION: CPT

## 2024-04-04 PROCEDURE — 97140 MANUAL THERAPY 1/> REGIONS: CPT

## 2024-04-04 PROCEDURE — 97110 THERAPEUTIC EXERCISES: CPT

## 2024-04-04 NOTE — PROGRESS NOTES
"Daily Note     Today's date: 2024  Patient name: Mercedes Lynne  : 1995  MRN: [id number]  Referring provider: Jermain Patricio D*  Dx:   Encounter Diagnosis     ICD-10-CM    1. Sprain of anterior talofibular ligament of left ankle, initial encounter  S93.492A           Start Time: 09  Stop Time: 1030  Total time in clinic (min): 57 minutes    Subjective: Patient reports she took a long walk this morning. Sees improvements but has discomfort with toe extension and toe off during ambulation. Patient states most pain is located on lateral dorsal side of left foot.       Objective: See treatment diary below      Assessment: Patient tolerated treatment well. Good tolerance to exercises and manual therapy today. Pt will benefit from continued skilled PT intervention in order to address remaining limitations and to restore maximal function.   Patient provided with updated HEP. No increased pain reported post treatment.       Plan: Continue per plan of care.      Precautions: none       Manuals 3/18 3/21 3/26 3/28 4/2 4/4       Forefoot mobs      FOTO NV*       TC mobilizations   KB gr 4 + subtalar          Ankle PROM  KM KB KR JG  JG                    Neuro Re-Ed             Rocker board A/P, M/L  20x ea 20x ea 20x ea 20x 20x ea       Standing marching on foam   20x ea 30x ea 30x ea 30x ea 30x ea       Tandem ambulation   4x on foam 4x on foam 4x on foam 4x on foam 4x on foam       SLS  On foam 30\"x3 On foam 30\"x3 On foam 30\"x3 On foam 30\"x3 On foam 30\"x3       SLS tramp ball toss    NV nv 2x10 2x10       Biodex LOS   NV LOS static lvl easy/med 3x LOS static lvl easy/med 3x LOS static lvl easy/med 3x                    Ther Ex             Pt education on HEP, POC 8 min             Bike for ROM  5' 8' 8' 8' 8'       Ankle ABCs HEP     Steamboats on foam    nv           Long sitting calf stretch 3x30s 3x30\" 3x30\" 3x30\" 3x30\" Pro stretch 30\" x2    Towel   3x30\"       Ankle 4 way  Otb 2x10 ea GTB 2x10 " "ea GTB 3x10 ea GTB 3x10 ea GTB 3x10 ea GTB 3x10 ea       Towel curls    2' 2' 2'        HR/TR seated c DB or KB  15# 20x ea 15# 20x ea 15# 20x ea 15# 20x ea 15# 20x ea             Plantar fascia towel stretch 15\"x5       Ther Activity                                       Gait Training                                       Modalities                   Cp 10'                        Access Code: [email or web address]  URL: [email or web address]  Date: 04/04/2024  Prepared by: Ladi Ramos    Exercises  - Single Leg Stance  - 1 x daily - 7 x weekly - 3 sets - 30 hold  - Seated Heel Toe Raises  - 1 x daily - 7 x weekly - 3 sets - 10 reps  - Seated Heel Raise  - 1 x daily - 7 x weekly - 3 sets - 10 reps  - Long Sitting Calf Stretch with Strap  - 1 x daily - 7 x weekly - 3 sets - 30 hold  - Long Sitting Plantar Fascia Stretch with Towel  - 1 x daily - 7 x weekly - 5 sets - 15 hold  - Towel Scrunches  - 1 x daily - 7 x weekly - 3 sets - 10 reps       "

## 2024-04-09 ENCOUNTER — OFFICE VISIT (OUTPATIENT)
Dept: PHYSICAL THERAPY | Facility: REHABILITATION | Age: 29
End: 2024-04-09
Payer: OTHER MISCELLANEOUS

## 2024-04-09 DIAGNOSIS — S93.492A SPRAIN OF ANTERIOR TALOFIBULAR LIGAMENT OF LEFT ANKLE, INITIAL ENCOUNTER: Primary | ICD-10-CM

## 2024-04-09 PROCEDURE — 97110 THERAPEUTIC EXERCISES: CPT

## 2024-04-09 PROCEDURE — 97140 MANUAL THERAPY 1/> REGIONS: CPT

## 2024-04-09 PROCEDURE — 97112 NEUROMUSCULAR REEDUCATION: CPT

## 2024-04-09 NOTE — PROGRESS NOTES
"Daily Note     Today's date: 2024  Patient name: Mercedes Lynne  : 1995  MRN: [id number]  Referring provider: Jermain Patricio D*  Dx:   Encounter Diagnosis     ICD-10-CM    1. Sprain of anterior talofibular ligament of left ankle, initial encounter  S93.492A                      Subjective: Patient denies soreness or pain upon arrival to therapy. She states the pain into her great toe with extension/toe off during gait remains about the same.    Objective: See treatment diary below      Assessment: Patient tolerated treatment well. Instructed pt on establishing more neuromuscular control on Biodex with good carryover and improved score. Progressed exercises as listed below with good tolerance, appropriate challenge and fatigue. Good tolerance to manuals, denies pain with completion. Will continue to progress as able in upcoming visits. Pt would benefit from continued skilled PT to improve current deficits and maximize function.    Plan: Continue per plan of care.      Precautions: none       Manuals 3/18 3/21 3/26 3/28 4/2 4/4 4/9      Forefoot mobs      FOTO NV*       TC mobilizations   KB gr 4 + subtalar          Ankle PROM  KM KB KR JG  JG SE                   Neuro Re-Ed             Rocker board A/P, M/L  20x ea 20x ea 20x ea 20x 20x ea 30x      Standing marching on foam   20x ea 30x ea 30x ea 30x ea 30x ea 30x ea      Tandem ambulation   4x on foam 4x on foam 4x on foam 4x on foam 4x on foam 4x on foam      SLS  On foam 30\"x3 On foam 30\"x3 On foam 30\"x3 On foam 30\"x3 On foam 30\"x3 On foam 30\"x3      SLS tramp ball toss    NV nv 2x10 2x10 x30      Biodex LOS   NV LOS static lvl easy/med 3x LOS static lvl easy/med 3x LOS static lvl easy/med 3x LOS static easy/med  3x                   Ther Ex             Pt education on HEP, POC 8 min             Bike for ROM  5' 8' 8' 8' 8' 8'      Ankle ABCs HEP     Steamboats on foam    nv Green therapad  1x10 ea          Long sitting calf stretch 3x30s " "3x30\" 3x30\" 3x30\" 3x30\" Pro stretch 30\" x2    Towel   3x30\" Pro stretch +step  30\"x2        Ankle 4 way  Otb 2x10 ea GTB 2x10 ea GTB 3x10 ea GTB 3x10 ea GTB 3x10 ea GTB 3x10 ea HEP      Towel curls    2' 2' 2'        HR/TR seated c DB or KB  15# 20x ea 15# 20x ea 15# 20x ea 15# 20x ea 15# 20x ea 15#  20x ea            Plantar fascia towel stretch 15\"x5 PF towel stretch 15\"x5      Ther Activity                                       Gait Training                                       Modalities                   Cp 10' CP  x10'                       Access Code: [email or web address]  URL: [email or web address]  Date: 04/04/2024  Prepared by: Ladi Raoms    Exercises  - Single Leg Stance  - 1 x daily - 7 x weekly - 3 sets - 30 hold  - Seated Heel Toe Raises  - 1 x daily - 7 x weekly - 3 sets - 10 reps  - Seated Heel Raise  - 1 x daily - 7 x weekly - 3 sets - 10 reps  - Long Sitting Calf Stretch with Strap  - 1 x daily - 7 x weekly - 3 sets - 30 hold  - Long Sitting Plantar Fascia Stretch with Towel  - 1 x daily - 7 x weekly - 5 sets - 15 hold  - Towel Scrunches  - 1 x daily - 7 x weekly - 3 sets - 10 reps       "

## 2024-04-11 ENCOUNTER — OFFICE VISIT (OUTPATIENT)
Dept: PHYSICAL THERAPY | Facility: REHABILITATION | Age: 29
End: 2024-04-11
Payer: OTHER MISCELLANEOUS

## 2024-04-11 DIAGNOSIS — S93.492A SPRAIN OF ANTERIOR TALOFIBULAR LIGAMENT OF LEFT ANKLE, INITIAL ENCOUNTER: Primary | ICD-10-CM

## 2024-04-11 PROCEDURE — 97112 NEUROMUSCULAR REEDUCATION: CPT

## 2024-04-11 PROCEDURE — 97110 THERAPEUTIC EXERCISES: CPT

## 2024-04-11 PROCEDURE — 97140 MANUAL THERAPY 1/> REGIONS: CPT

## 2024-04-11 NOTE — PROGRESS NOTES
"Daily Note     Today's date: 2024  Patient name: Mercedes Lynne  : 1995  MRN: [id number]  Referring provider: Jermain Patricio D*  Dx:   Encounter Diagnosis     ICD-10-CM    1. Sprain of anterior talofibular ligament of left ankle, initial encounter  S93.492A           Start Time: 0930  Stop Time: 1018  Total time in clinic (min): 48 minutes    Subjective: Pt denies any medical changes and reports no sig pain at the start of her session.       Objective: See treatment diary below      Assessment: Tolerated treatment well. Exercises were reviewed, and patient is given cues on reps/sets throughout session. She denies any increase in sx during treatment session. Patient demonstrated fatigue post treatment, exhibited good technique with therapeutic exercises, and would benefit from continued PT      Plan: Continue per plan of care.      Precautions: none       Manuals 3/18 3/21 3/26 3/28 4/2 4/4 4/9 4/11     Forefoot mobs      FOTO NV*       TC mobilizations   KB gr 4 + subtalar          Ankle PROM  KM KB KR JG  JG SE                   Neuro Re-Ed             Rocker board A/P, M/L  20x ea 20x ea 20x ea 20x 20x ea 30x 30x     Standing marching on foam   20x ea 30x ea 30x ea 30x ea 30x ea 30x ea 30x ea     Tandem ambulation   4x on foam 4x on foam 4x on foam 4x on foam 4x on foam 4x on foam 4x on foam     SLS  On foam 30\"x3 On foam 30\"x3 On foam 30\"x3 On foam 30\"x3 On foam 30\"x3 On foam 30\"x3 On foam 30\"x3     SLS tramp ball toss    NV nv 2x10 2x10 x30      Biodex LOS   NV LOS static lvl easy/med 3x LOS static lvl easy/med 3x LOS static lvl easy/med 3x LOS static easy/med  3x LOS static easy/med  3x                  Ther Ex             Pt education on HEP, POC 8 min             Bike for ROM  5' 8' 8' 8' 8' 8' 8'     Ankle ABCs HEP     Steamboats on foam    nv Green therapad  1x10 ea     Green therapad  1x10 ea     Long sitting calf stretch 3x30s 3x30\" 3x30\" 3x30\" 3x30\" Pro stretch 30\" x2    Towel " "  3x30\" Pro stretch +step  30\"x2   Pro stretch +step  30\"x2     Ankle 4 way  Otb 2x10 ea GTB 2x10 ea GTB 3x10 ea GTB 3x10 ea GTB 3x10 ea GTB 3x10 ea HEP      Towel curls    2' 2' 2'        HR/TR seated c DB or KB  15# 20x ea 15# 20x ea 15# 20x ea 15# 20x ea 15# 20x ea 15#  20x ea 15#  30x ea           Plantar fascia towel stretch 15\"x5 PF towel stretch 15\"x5 PF towel stretch 15\"x5     Ther Activity                                       Gait Training                                       Modalities                   Cp 10' CP  x10' declines                      Access Code: [email or web address]  URL: [email or web address]  Date: 04/04/2024  Prepared by: Ladi Ramos    Exercises  - Single Leg Stance  - 1 x daily - 7 x weekly - 3 sets - 30 hold  - Seated Heel Toe Raises  - 1 x daily - 7 x weekly - 3 sets - 10 reps  - Seated Heel Raise  - 1 x daily - 7 x weekly - 3 sets - 10 reps  - Long Sitting Calf Stretch with Strap  - 1 x daily - 7 x weekly - 3 sets - 30 hold  - Long Sitting Plantar Fascia Stretch with Towel  - 1 x daily - 7 x weekly - 5 sets - 15 hold  - Towel Scrunches  - 1 x daily - 7 x weekly - 3 sets - 10 reps         "

## 2024-04-15 ENCOUNTER — OFFICE VISIT (OUTPATIENT)
Dept: PODIATRY | Facility: CLINIC | Age: 29
End: 2024-04-15

## 2024-04-15 VITALS — HEART RATE: 80 BPM | SYSTOLIC BLOOD PRESSURE: 120 MMHG | DIASTOLIC BLOOD PRESSURE: 60 MMHG

## 2024-04-15 DIAGNOSIS — S93.492A SPRAIN OF ANTERIOR TALOFIBULAR LIGAMENT OF LEFT ANKLE, INITIAL ENCOUNTER: Primary | ICD-10-CM

## 2024-04-15 PROCEDURE — 99213 OFFICE O/P EST LOW 20 MIN: CPT | Performed by: PODIATRIST

## 2024-04-15 NOTE — LETTER
April 15, 2024     Patient: Mercedes Lynne  YOB: 1995  Date of Visit: 4/15/2024      To Whom it May Concern:    Mercedes Lynne is under my professional care. Mercedes was seen in my office on 4/15/2024. Mercedes has moderate left foot and ankle sprain but is seeing good improvement with physical therapy. Due tot he physical nature of her job, still recommending light duty for another month.    Light duty for 4 more weeks  No lifting over 10 pounds  No standing more than 15 minutes per hour  No squatting, climbing, or jumping  Hopeful lift of restrictions in 4 weeks. .    If you have any questions or concerns, please don't hesitate to call.         Sincerely,          Jermain Patricio DPM        CC: No Recipients

## 2024-04-15 NOTE — PROGRESS NOTES
"Assessment/Plan:      Diagnoses and all orders for this visit:    Sprain of anterior talofibular ligament of left ankle, initial encounter      She is seeing improvement  Continue light duty at work  Continue PT another 4 weeks  RICE protocol  Proper sneakers discussed  RTC 4 weeks, hopeful return to full duty    Subjective:     Patient ID: Mercedes Lynne is a 28 y.o. female.    Patient sprained her left ankle last month. She has been in PT. She is seeing improvement \"maybe 50-75%. Therapy helps a lot.         Review of Systems    As stated in HPI, otherwise normal      Objective:     Physical Exam  Vitals reviewed.   Constitutional:       Appearance: She is not ill-appearing or diaphoretic.   Cardiovascular:      Rate and Rhythm: Normal rate.      Pulses: Normal pulses.   Pulmonary:      Effort: Pulmonary effort is normal. No respiratory distress.   Musculoskeletal:         General: Tenderness present.      Left ankle: No swelling, deformity, ecchymosis or lacerations. Tenderness present over the ATF ligament (mild). No lateral malleolus, medial malleolus or proximal fibula tenderness. Normal range of motion. Anterior drawer test negative. Normal pulse.      Left Achilles Tendon: Normal. No tenderness or defects. Webb's test negative.   Skin:     Capillary Refill: Capillary refill takes less than 2 seconds.      Findings: No erythema or rash.   Neurological:      Mental Status: She is alert and oriented to person, place, and time.      Sensory: No sensory deficit.      Gait: Gait normal.   Psychiatric:         Mood and Affect: Mood normal.           "

## 2024-04-16 ENCOUNTER — OFFICE VISIT (OUTPATIENT)
Dept: PHYSICAL THERAPY | Facility: REHABILITATION | Age: 29
End: 2024-04-16
Payer: OTHER MISCELLANEOUS

## 2024-04-16 DIAGNOSIS — S93.492A SPRAIN OF ANTERIOR TALOFIBULAR LIGAMENT OF LEFT ANKLE, INITIAL ENCOUNTER: Primary | ICD-10-CM

## 2024-04-16 PROCEDURE — 97112 NEUROMUSCULAR REEDUCATION: CPT

## 2024-04-16 PROCEDURE — 97140 MANUAL THERAPY 1/> REGIONS: CPT

## 2024-04-16 PROCEDURE — 97110 THERAPEUTIC EXERCISES: CPT

## 2024-04-16 NOTE — PROGRESS NOTES
"Daily Note     Today's date: 2024  Patient name: Mercedes Lynne  : 1995  MRN: [id number]  Referring provider: Jermain Patricio D*  Dx:   Encounter Diagnosis     ICD-10-CM    1. Sprain of anterior talofibular ligament of left ankle, initial encounter  S93.492A           Start Time: 924  Stop Time: 1010  Total time in clinic (min): 46 minutes    Subjective: Pt reports no sig discomfort at the start of her session and no medical changes since her last visit.       Objective: See treatment diary below      Assessment: Tolerated treatment well. Patient demonstrated fatigue post treatment, exhibited good technique with therapeutic exercises, and would benefit from continued PT      Plan: Continue per plan of care.      Precautions: none   To access your Home Exercise Program:  Scan     OR     Visit  [email or web address]  Access Code: [email or web address]    Manuals 3/18 3/21 3/26 3/28 4/2 4/4 4/9 4/11 4/16    Forefoot mobs      FOTO NV*       TC mobilizations   KB gr 4 + subtalar          Ankle PROM  KM KB KR JG  JG SE FH                  Neuro Re-Ed             Rocker board A/P, M/L  20x ea 20x ea 20x ea 20x 20x ea 30x 30x 30x     Standing marching on foam   20x ea 30x ea 30x ea 30x ea 30x ea 30x ea 30x ea 30x ea    Tandem ambulation   4x on foam 4x on foam 4x on foam 4x on foam 4x on foam 4x on foam 4x on foam 4x on foam    SLS  On foam 30\"x3 On foam 30\"x3 On foam 30\"x3 On foam 30\"x3 On foam 30\"x3 On foam 30\"x3 On foam 30\"x3 On foam 30\"x3    SLS tramp ball toss    NV nv 2x10 2x10 x30      Biodex LOS   NV LOS static lvl easy/med 3x LOS static lvl easy/med 3x LOS static lvl easy/med 3x LOS static easy/med  3x LOS static easy/med  3x LOS static easy/med  3x                 Ther Ex             Pt education on HEP, POC 8 min             Bike for ROM  5' 8' 8' 8' 8' 8' 8' 8'    Ankle ABCs HEP     Steamboats on foam    nv Green therapad  1x10 ea     Green therapad  1x10 ea Green therapad  1x15  ea    Long " "sitting calf stretch 3x30s 3x30\" 3x30\" 3x30\" 3x30\" Pro stretch 30\" x2    Towel   3x30\" Pro stretch +step  30\"x2   Pro stretch +step  30\"x2 Pro stretch +step  30\"x2    Ankle 4 way  Otb 2x10 ea GTB 2x10 ea GTB 3x10 ea GTB 3x10 ea GTB 3x10 ea GTB 3x10 ea HEP      Towel curls    2' 2' 2'        HR/TR seated c DB or KB  15# 20x ea 15# 20x ea 15# 20x ea 15# 20x ea 15# 20x ea 15#  20x ea 15#  30x ea 15#  30x ea          Plantar fascia towel stretch 15\"x5 PF towel stretch 15\"x5 PF towel stretch 15\"x5 PF towel stretch 15\"x5    Ther Activity                                       Gait Training                                       Modalities                   Cp 10' CP  x10' declines                      Access Code: [email or web address]  URL: [email or web address]  Date: 04/04/2024  Prepared by: Ladi Ramos    Exercises  - Single Leg Stance  - 1 x daily - 7 x weekly - 3 sets - 30 hold  - Seated Heel Toe Raises  - 1 x daily - 7 x weekly - 3 sets - 10 reps  - Seated Heel Raise  - 1 x daily - 7 x weekly - 3 sets - 10 reps  - Long Sitting Calf Stretch with Strap  - 1 x daily - 7 x weekly - 3 sets - 30 hold  - Long Sitting Plantar Fascia Stretch with Towel  - 1 x daily - 7 x weekly - 5 sets - 15 hold  - Towel Scrunches  - 1 x daily - 7 x weekly - 3 sets - 10 reps           "

## 2024-04-18 ENCOUNTER — OFFICE VISIT (OUTPATIENT)
Dept: PHYSICAL THERAPY | Facility: REHABILITATION | Age: 29
End: 2024-04-18
Payer: OTHER MISCELLANEOUS

## 2024-04-18 DIAGNOSIS — S93.492A SPRAIN OF ANTERIOR TALOFIBULAR LIGAMENT OF LEFT ANKLE, INITIAL ENCOUNTER: Primary | ICD-10-CM

## 2024-04-18 PROCEDURE — 97112 NEUROMUSCULAR REEDUCATION: CPT

## 2024-04-18 PROCEDURE — 97140 MANUAL THERAPY 1/> REGIONS: CPT

## 2024-04-18 PROCEDURE — 97110 THERAPEUTIC EXERCISES: CPT

## 2024-04-18 NOTE — PROGRESS NOTES
"Daily Note     Today's date: 2024  Patient name: Mercedes Lynne  : 1995  MRN: [id number]  Referring provider: Jermain Patricio D*  Dx:   Encounter Diagnosis     ICD-10-CM    1. Sprain of anterior talofibular ligament of left ankle, initial encounter  S93.492A           Start Time: 0940  Stop Time: 1020  Total time in clinic (min): 40 minutes    Subjective: Pt reports no medical updates since last session and pain is minimal       Objective: See treatment diary below      Assessment: Tolerated treatment well. Patient demonstrated fatigue post treatment, exhibited good technique with therapeutic exercises, and would benefit from continued PT      Plan: Continue per plan of care.      Precautions: none   To access your Home Exercise Program:  Scan     OR     Visit  [email or web address]  Access Code: [email or web address]    Manuals 3/18 3/21 3/26 3/28 4/2 4/4 4/9 4/11 4/16 4/18   Forefoot mobs      FOTO NV*       TC mobilizations   KB gr 4 + subtalar          Ankle PROM  KM KB KR JG  JG SE [facility]                Neuro Re-Ed             Rocker board A/P, M/L  20x ea 20x ea 20x ea 20x 20x ea 30x 30x 30x  30x    Standing marching on foam   20x ea 30x ea 30x ea 30x ea 30x ea 30x ea 30x ea 30x ea 30x ea   Tandem ambulation   4x on foam 4x on foam 4x on foam 4x on foam 4x on foam 4x on foam 4x on foam 4x on foam 5x on foam   SLS  On foam 30\"x3 On foam 30\"x3 On foam 30\"x3 On foam 30\"x3 On foam 30\"x3 On foam 30\"x3 On foam 30\"x3 On foam 30\"x3 On foam 30\"x3   SLS tramp ball toss    NV nv 2x10 2x10 x30      Biodex LOS   NV LOS static lvl easy/med 3x LOS static lvl easy/med 3x LOS static lvl easy/med 3x LOS static easy/med  3x LOS static easy/med  3x LOS static easy/med  3x LOS, Maze control, WS 3x ea                Ther Ex             Pt education on HEP, POC 8 min             Bike for ROM  5' 8' 8' 8' 8' 8' 8' 8' 8'   Ankle ABCs HEP     Steamboats on foam    nv Green therapad  1x10 ea     Green therapad  1x10 ea " "Green therapad  1x15  ea Green therapad  1x15  ea   Long sitting calf stretch 3x30s 3x30\" 3x30\" 3x30\" 3x30\" Pro stretch 30\" x2    Towel   3x30\" Pro stretch +step  30\"x2   Pro stretch +step  30\"x2 Pro stretch +step  30\"x2 Pro stretch +step  30\"x2   Ankle 4 way  Otb 2x10 ea GTB 2x10 ea GTB 3x10 ea GTB 3x10 ea GTB 3x10 ea GTB 3x10 ea HEP      Towel curls    2' 2' 2'        HR/TR seated c DB or KB  15# 20x ea 15# 20x ea 15# 20x ea 15# 20x ea 15# 20x ea 15#  20x ea 15#  30x ea 15#  30x ea 15#  30x ea         Plantar fascia towel stretch 15\"x5 PF towel stretch 15\"x5 PF towel stretch 15\"x5 PF towel stretch 15\"x5 PF towel stretch 15\"x5   Ther Activity                                       Gait Training                                       Modalities                   Cp 10' CP  x10' declines                      Access Code: [email or web address]  URL: [email or web address]  Date: 04/04/2024  Prepared by: Ladi Ramos    Exercises  - Single Leg Stance  - 1 x daily - 7 x weekly - 3 sets - 30 hold  - Seated Heel Toe Raises  - 1 x daily - 7 x weekly - 3 sets - 10 reps  - Seated Heel Raise  - 1 x daily - 7 x weekly - 3 sets - 10 reps  - Long Sitting Calf Stretch with Strap  - 1 x daily - 7 x weekly - 3 sets - 30 hold  - Long Sitting Plantar Fascia Stretch with Towel  - 1 x daily - 7 x weekly - 5 sets - 15 hold  - Towel Scrunches  - 1 x daily - 7 x weekly - 3 sets - 10 reps             "

## 2024-04-23 ENCOUNTER — OFFICE VISIT (OUTPATIENT)
Dept: PHYSICAL THERAPY | Facility: REHABILITATION | Age: 29
End: 2024-04-23
Payer: OTHER MISCELLANEOUS

## 2024-04-23 DIAGNOSIS — S93.492A SPRAIN OF ANTERIOR TALOFIBULAR LIGAMENT OF LEFT ANKLE, INITIAL ENCOUNTER: Primary | ICD-10-CM

## 2024-04-23 PROCEDURE — 97140 MANUAL THERAPY 1/> REGIONS: CPT

## 2024-04-23 PROCEDURE — 97112 NEUROMUSCULAR REEDUCATION: CPT

## 2024-04-23 PROCEDURE — 97110 THERAPEUTIC EXERCISES: CPT

## 2024-04-23 NOTE — PROGRESS NOTES
"Daily Note     Today's date: 2024  Patient name: Mercedes Lynne  : 1995  MRN: [id number]  Referring provider: Jermain Patricio D*  Dx:   Encounter Diagnosis     ICD-10-CM    1. Sprain of anterior talofibular ligament of left ankle, initial encounter  S93.492A           Start Time: 0940  Stop Time: 1040  Total time in clinic (min): 60 minutes    Subjective: Continue to have discomfort on lateral aspect of left foot.       Objective: See treatment diary below      Assessment: Tolerated treatment well. Patient demonstrated fatigue post treatment, exhibited good technique with therapeutic exercises, and would benefit from continued PT. Relief reported post manual therapy.          Plan: Continue per plan of care.      Precautions: none   To access your Home Exercise Program:  Scan     OR     Visit  [email or web address]  Access Code: [email or web address]    Manuals 4/23 3/21 3/26 3/28 4/2 4/4 4/9 4/11 4/16 4/18   Forefoot mobs      FOTO NV*       TC mobilizations   KB gr 4 + subtalar          Ankle PROM  KM KB KR JG  JG SE [facility]                Neuro Re-Ed             Rocker board A/P, M/L 30x 20x ea 20x ea 20x ea 20x 20x ea 30x 30x 30x  30x    Standing marching on foam  30x ea 20x ea 30x ea 30x ea 30x ea 30x ea 30x ea 30x ea 30x ea 30x ea   Tandem ambulation  5x on foam 4x on foam 4x on foam 4x on foam 4x on foam 4x on foam 4x on foam 4x on foam 4x on foam 5x on foam   SLS On foam 30\"x3 On foam 30\"x3 On foam 30\"x3 On foam 30\"x3 On foam 30\"x3 On foam 30\"x3 On foam 30\"x3 On foam 30\"x3 On foam 30\"x3 On foam 30\"x3   SLS tramp ball toss    NV nv 2x10 2x10 x30      Biodex LOS LOS, Maze control, WS 3x ea  NV LOS static lvl easy/med 3x LOS static lvl easy/med 3x LOS static lvl easy/med 3x LOS static easy/med  3x LOS static easy/med  3x LOS static easy/med  3x LOS, Maze control, WS 3x ea                Ther Ex             Pt education on HEP, POC             Bike for ROM 8' 5' 8' 8' 8' 8' 8' 8' 8' 8'   Ankle " "ABCs Green therapad  1x15  ea     Steamboats on foam    nv Green therapad  1x10 ea     Green therapad  1x10 ea Green therapad  1x15  ea Green therapad  1x15  ea   Long sitting calf stretch Pro stretch   30\"x2 3x30\" 3x30\" 3x30\" 3x30\" Pro stretch 30\" x2    Towel   3x30\" Pro stretch +step  30\"x2   Pro stretch +step  30\"x2 Pro stretch +step  30\"x2 Pro stretch +step  30\"x2   Ankle 4 way   GTB 2x10 ea GTB 3x10 ea GTB 3x10 ea GTB 3x10 ea GTB 3x10 ea HEP      Towel curls    2' 2' 2'        HR/TR seated c DB or KB 15#  30x ea 15# 20x ea 15# 20x ea 15# 20x ea 15# 20x ea 15# 20x ea 15#  20x ea 15#  30x ea 15#  30x ea 15#  30x ea         Plantar fascia towel stretch 15\"x5 PF towel stretch 15\"x5 PF towel stretch 15\"x5 PF towel stretch 15\"x5 PF towel stretch 15\"x5   Ther Activity                                       Gait Training                                       Modalities                   Cp 10' CP  x10' declines                      Access Code: [email or web address]  URL: [email or web address]  Date: 04/04/2024  Prepared by: Ladi Ramos    Exercises  - Single Leg Stance  - 1 x daily - 7 x weekly - 3 sets - 30 hold  - Seated Heel Toe Raises  - 1 x daily - 7 x weekly - 3 sets - 10 reps  - Seated Heel Raise  - 1 x daily - 7 x weekly - 3 sets - 10 reps  - Long Sitting Calf Stretch with Strap  - 1 x daily - 7 x weekly - 3 sets - 30 hold  - Long Sitting Plantar Fascia Stretch with Towel  - 1 x daily - 7 x weekly - 5 sets - 15 hold  - Towel Scrunches  - 1 x daily - 7 x weekly - 3 sets - 10 reps               "

## 2024-04-25 ENCOUNTER — EVALUATION (OUTPATIENT)
Dept: PHYSICAL THERAPY | Facility: REHABILITATION | Age: 29
End: 2024-04-25
Payer: OTHER MISCELLANEOUS

## 2024-04-25 DIAGNOSIS — S93.492A SPRAIN OF ANTERIOR TALOFIBULAR LIGAMENT OF LEFT ANKLE, INITIAL ENCOUNTER: Primary | ICD-10-CM

## 2024-04-25 PROCEDURE — 97110 THERAPEUTIC EXERCISES: CPT

## 2024-04-25 PROCEDURE — 97140 MANUAL THERAPY 1/> REGIONS: CPT

## 2024-04-25 PROCEDURE — 97112 NEUROMUSCULAR REEDUCATION: CPT

## 2024-04-25 NOTE — PROGRESS NOTES
PT Re-Evaluation     Today's date: 2024  Patient name: Mercedes Lynne  : 1995  MRN: [id number]  Referring provider: Jermain Patricio D*  Dx:   Encounter Diagnosis     ICD-10-CM    1. Sprain of anterior talofibular ligament of left ankle, initial encounter  S93.492A           Start Time: 0945  Stop Time: 1030  Total time in clinic (min): 45 minutes      Assessment  Assessment details: Mercedes Lynne is a pleasant 28 y.o. female who presents to OP PT for RE of  ankle sprain.  Pt reports she is able to amb up to 30 minutes before discomfort and standing up to 40 minutes before onset of discomfort. Pt is currently with modifications 15 min standing per hour, lifting no greater than 10Ibs, and min to no repeated squatting, and no jumping. Next follow up with MD is mid May. Pt presents with sig strength and motion improvements as well as increase balance compared to previous assessment. She continues to demonstrates mild gait deficits and ev/inv strength deficits. Pt. will benefit from skilled PT services that includes manual therapy techniques to enhance tissue extensibility, neuromuscular re-education to facilitate motor control, therapeutic exercise to increase functional mobility, and modalities prn to reduce pain and inflammation.        Impairments: abnormal gait, abnormal or restricted ROM, abnormal movement, activity intolerance, impaired physical strength, lacks appropriate home exercise program and pain with function    Symptom irritability: lowUnderstanding of Dx/Px/POC: good   Prognosis: good  Prognosis details: Positive prognostic indicators include positive attitude toward recovery and absence of observed red flags.  Negative prognostic indicators include none.      Goals  ST. Independent with HEP in 2 weeks (met)  2. Pt will have verbal report of improvement in symptoms by >/=25% in 2 weeks (met)    To be achieved by D/C   LT. Pt will be able to return to work without  restrictions (progress)  2. Pt will be able to ascend/descend stairs with no difficulty (MET)  3. Pt will be able to return to yoga without restrictions (Progress)  4. Pt will be able to ambulate community distances with no difficulty (progress)      Plan  Patient would benefit from: skilled physical therapy  Planned therapy interventions: activity modification, manual therapy, motor coordination training, neuromuscular re-education, patient education, self care, therapeutic activities, therapeutic exercise, graded activity, home exercise program, graded exercise, functional ROM exercises and strengthening  Frequency: 2x week  Duration in weeks: 8  Plan of Care beginning date: 2024  Plan of Care expiration date: 2024  Treatment plan discussed with: patient      Subjective Evaluation    History of Present Illness  Mechanism of injury: Pt reports that on  she stepped off a step at work and landed of the ankle. She went to the ED and was told to stay off her foot until she saw podiatry. She saw podiatry last week and was cleared to do PT. 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 She notes that thing she has the most difficulty with is the push off with her toes.   Patient Goals  Patient goals for therapy: return to work and decreased pain  Patient goal: be able to stairs, be able to perform lifts of about 60-70lbs, be able to go for walks,  Pain  Current pain ratin  At best pain ratin  At worst pain ratin  Location: medial and lateral ankle, and top of foot  Aggravating factors: standing, walking and stair climbing  Progression: improved          Objective     Active Range of Motion   Left Ankle/Foot   Dorsiflexion (ke): 18 degrees   Plantar flexion: 64 degrees with pain  Inversion: 40 degrees with pain  Eversion: 20 degrees with pain  Great toe extension: 30 degrees     Right Ankle/Foot   Dorsiflexion (ke): 2 degrees   Plantar flexion: 80  "degrees   Inversion: 50 degrees   Eversion: 15 degrees   Great toe extension: 60 degrees     Passive Range of Motion   Left Ankle/Foot    Dorsiflexion (ke): 20 degrees   Plantar flexion: 70 degrees with pain  Inversion: 40 degrees   Eversion: 20 degrees   Great toe extension: 60 degrees     Right Ankle/Foot  Normal passive range of motion    Swelling   Left Ankle/Foot   WNL    STRENGTH:     Left ankle     EV: 4/5   INV: 4/5  DF: 4+/5  PF: 4/5      Flowsheet Rows      Flowsheet Row Most Recent Value   PT/OT G-Codes    Current Score 63   Projected Score 73               Precautions: No past medical history on file.    Manuals 4/23 4/25 3/21 3/26 3/28 4/2 4/4 4/9 4/11 4/16 4/18   Forefoot mobs       FOTO NV*       TC mobilizations    KB gr 4 + subtalar          Ankle PROM   KM KB KR JG  JG SE [facility]                 Neuro Re-Ed              Rocker board A/P, M/L 30x SLS 2x10  20x ea 20x ea 20x ea 20x 20x ea 30x 30x 30x  30x    Standing marching on foam  30x ea  20x ea 30x ea 30x ea 30x ea 30x ea 30x ea 30x ea 30x ea 30x ea   Hip abd, hip ext mat  3x10            Tandem ambulation  5x on foam  4x on foam 4x on foam 4x on foam 4x on foam 4x on foam 4x on foam 4x on foam 4x on foam 5x on foam   SLS On foam 30\"x3 On foam 30\"x3 On foam 30\"x3 On foam 30\"x3 On foam 30\"x3 On foam 30\"x3 On foam 30\"x3 On foam 30\"x3 On foam 30\"x3 On foam 30\"x3 On foam 30\"x3   SLS tramp ball toss   2x10   NV nv 2x10 2x10 x30      Squats   2x10            Biodex LOS LOS, Maze control, WS 3x ea   NV LOS static lvl easy/med 3x LOS static lvl easy/med 3x LOS static lvl easy/med 3x LOS static easy/med  3x LOS static easy/med  3x LOS static easy/med  3x LOS, Maze control, WS 3x ea   Legg press  44# 3x10 SL            Ther Ex              Pt education on HEP, POC              Bike for ROM 8' 8' 5' 8' 8' 8' 8' 8' 8' 8' 8'   Ankle ABCs Green therapad  1x15  ea      Steamboats on foam    nv Green therapad  1x10 ea     Green therapad  1x10 ea Green " "therapad  1x15  ea Green therapad  1x15  ea   Long sitting calf stretch Pro stretch   30\"x2 30''x3 3x30\" 3x30\" 3x30\" 3x30\" Pro stretch 30\" x2    Towel   3x30\" Pro stretch +step  30\"x2   Pro stretch +step  30\"x2 Pro stretch +step  30\"x2 Pro stretch +step  30\"x2   Ankle 4 way    GTB 2x10 ea GTB 3x10 ea GTB 3x10 ea GTB 3x10 ea GTB 3x10 ea HEP      Towel curls     2' 2' 2'        HR/TR seated c DB or KB 15#  30x ea HR/TR standing 2x10 ea 15# 20x ea 15# 20x ea 15# 20x ea 15# 20x ea 15# 20x ea 15#  20x ea 15#  30x ea 15#  30x ea 15#  30x ea          Plantar fascia towel stretch 15\"x5 PF towel stretch 15\"x5 PF towel stretch 15\"x5 PF towel stretch 15\"x5 PF towel stretch 15\"x5   Ther Activity                                          Gait Training                                          Modalities                     Cp 10' CP  x10' declines                     Scan     OR     Visit  [employer]  Access Code: [email or web address]    Access Code: [email or web address]  URL: [email or web address]  Date: 04/04/2024  Prepared by: Ladi Ramos    Exercises  - Single Leg Stance  - 1 x daily - 7 x weekly - 3 sets - 30 hold  - Seated Heel Toe Raises  - 1 x daily - 7 x weekly - 3 sets - 10 reps  - Seated Heel Raise  - 1 x daily - 7 x weekly - 3 sets - 10 reps  - Long Sitting Calf Stretch with Strap  - 1 x daily - 7 x weekly - 3 sets - 30 hold  - Long Sitting Plantar Fascia Stretch with Towel  - 1 x daily - 7 x weekly - 5 sets - 15 hold  - Towel Scrunches  - 1 x daily - 7 x weekly - 3 sets - 10 reps             "

## 2024-04-30 ENCOUNTER — OFFICE VISIT (OUTPATIENT)
Dept: PHYSICAL THERAPY | Facility: REHABILITATION | Age: 29
End: 2024-04-30
Payer: OTHER MISCELLANEOUS

## 2024-04-30 DIAGNOSIS — S93.492A SPRAIN OF ANTERIOR TALOFIBULAR LIGAMENT OF LEFT ANKLE, INITIAL ENCOUNTER: Primary | ICD-10-CM

## 2024-04-30 PROCEDURE — 97140 MANUAL THERAPY 1/> REGIONS: CPT

## 2024-04-30 PROCEDURE — 97112 NEUROMUSCULAR REEDUCATION: CPT

## 2024-04-30 PROCEDURE — 97110 THERAPEUTIC EXERCISES: CPT

## 2024-04-30 NOTE — PROGRESS NOTES
"Daily Note     Today's date: 2024  Patient name: Mercedes Lynne  : 1995  MRN: [id number]  Referring provider: Jermain Patricio D*  Dx: No diagnosis found.    Start Time: 1030  Stop Time: 1115  Total time in clinic (min): 45 minutes    Subjective: Patient reports seeing minimal improvements but continues to have lateral left foot pain when side stepping. Patient reports that she noticed when she puts weight through left foot that she has a \"bump\" that shows on the lateral aspect of her foot.       Objective: See treatment diary below      Assessment: Patient continues to tolerate treatment well. Good form and understanding of exercises performed. Pt will benefit from continued skilled PT intervention in order to address remaining limitations and to restore maximal function.         Plan: Continue per plan of care.      Precautions: No past medical history on file.    Manuals 4/23 4/25 4/30  3/28 4/2 4/4 4/9 4/11 4/16 4/18   Forefoot mobs       FOTO NV*       TC mobilizations              Ankle PROM  FH   KR JG  JG SE FH FH                  Neuro Re-Ed              Rocker board A/P, M/L 30x SLS 2x10  SLS 2x10  20x ea 20x 20x ea 30x 30x 30x  30x    Standing marching on foam  30x ea  30x  30x ea 30x ea 30x ea 30x ea 30x ea 30x ea 30x ea   Hip abd, hip ext mat  3x10            Tandem ambulation  5x on foam    4x on foam 4x on foam 4x on foam 4x on foam 4x on foam 4x on foam 5x on foam   SLS On foam 30\"x3 On foam 30\"x3 On foam 30\"x3  On foam 30\"x3 On foam 30\"x3 On foam 30\"x3 On foam 30\"x3 On foam 30\"x3 On foam 30\"x3 On foam 30\"x3   SLS tramp ball toss   2x10  2x10  nv 2x10 2x10 x30      Squats   2x10 2x10           Biodex LOS LOS, Maze control, WS 3x ea  LOS, Maze control, 2x ea  LOS static lvl easy/med 3x LOS static lvl easy/med 3x LOS static lvl easy/med 3x LOS static easy/med  3x LOS static easy/med  3x LOS static easy/med  3x LOS, Maze control, WS 3x ea   Legg press  44# 3x10 SL 44# 3x10 SL     " "      Ther Ex              Pt education on HEP, POC              Bike for ROM 8' 8' 8'  8' 8' 8' 8' 8' 8' 8'   Ankle ABCs Green therapad  1x15  ea      Steamboats on foam    nv Green therapad  1x10 ea     Green therapad  1x10 ea Green therapad  1x15  ea Green therapad  1x15  ea   Long sitting calf stretch Pro stretch   30\"x2 30''x3   3x30\" 3x30\" Pro stretch 30\" x2    Towel   3x30\" Pro stretch +step  30\"x2   Pro stretch +step  30\"x2 Pro stretch +step  30\"x2 Pro stretch +step  30\"x2   Ankle 4 way      GTB 3x10 ea GTB 3x10 ea GTB 3x10 ea HEP      Towel curls      2' 2'        HR/TR seated c DB or KB 15#  30x ea HR/TR standing 2x10 ea HR/TR standing 2x10 ea  15# 20x ea 15# 20x ea 15# 20x ea 15#  20x ea 15#  30x ea 15#  30x ea 15#  30x ea          Plantar fascia towel stretch 15\"x5 PF towel stretch 15\"x5 PF towel stretch 15\"x5 PF towel stretch 15\"x5 PF towel stretch 15\"x5   Ther Activity                                          Gait Training                                          Modalities                     Cp 10' CP  x10' declines                     Scan     OR     Visit  [email or web address]  Access Code: [email or web address]    Access Code: [email or web address]  URL: [email or web address]  Date: 04/04/2024  Prepared by: Ladi Ramos    Exercises  - Single Leg Stance  - 1 x daily - 7 x weekly - 3 sets - 30 hold  - Seated Heel Toe Raises  - 1 x daily - 7 x weekly - 3 sets - 10 reps  - Seated Heel Raise  - 1 x daily - 7 x weekly - 3 sets - 10 reps  - Long Sitting Calf Stretch with Strap  - 1 x daily - 7 x weekly - 3 sets - 30 hold  - Long Sitting Plantar Fascia Stretch with Towel  - 1 x daily - 7 x weekly - 5 sets - 15 hold  - Towel Scrunches  - 1 x daily - 7 x weekly - 3 sets - 10 reps             "

## 2024-05-02 ENCOUNTER — OFFICE VISIT (OUTPATIENT)
Dept: PHYSICAL THERAPY | Facility: REHABILITATION | Age: 29
End: 2024-05-02
Payer: OTHER MISCELLANEOUS

## 2024-05-02 DIAGNOSIS — S93.492A SPRAIN OF ANTERIOR TALOFIBULAR LIGAMENT OF LEFT ANKLE, INITIAL ENCOUNTER: Primary | ICD-10-CM

## 2024-05-02 PROCEDURE — 97140 MANUAL THERAPY 1/> REGIONS: CPT

## 2024-05-02 PROCEDURE — 97110 THERAPEUTIC EXERCISES: CPT

## 2024-05-02 PROCEDURE — 97112 NEUROMUSCULAR REEDUCATION: CPT

## 2024-05-02 NOTE — PROGRESS NOTES
"Daily Note     Today's date: 2024  Patient name: Mercedes Lynne  : 1995  MRN: [id number]  Referring provider: Jermain Patricio D*  Dx:   Encounter Diagnosis     ICD-10-CM    1. Sprain of anterior talofibular ligament of left ankle, initial encounter  S93.492A           Start Time: 0945  Stop Time: 1030  Total time in clinic (min): 45 minutes    Subjective: Patient reports she went for 4-5 mile walk on Tuesday and had no increased pain.       Objective: See treatment diary below      Assessment: Patient tolerated treatment well and was able to complete all exercises without complaints of pain or increase in symptoms. Pt will benefit from continued skilled PT intervention in order to address remaining limitations and to restore maximal function.         Plan: Continue per plan of care.      Precautions: No past medical history on file.    Manuals    Forefoot mobs       FOTO NV*       TC mobilizations              Ankle PROM  FH JG JG  JG  JG SE [facility]                 Neuro Re-Ed              Rocker board A/P, M/L 30x SLS 2x10  SLS 2x10 SLS 2x10  20x 20x ea 30x 30x 30x  30x    Standing marching on foam  30x ea  30x 30x  30x ea 30x ea 30x ea 30x ea 30x ea 30x ea   Hip abd, hip ext mat  3x10  3x10          Tandem ambulation  5x on foam     4x on foam 4x on foam 4x on foam 4x on foam 4x on foam 5x on foam   SLS On foam 30\"x3 On foam 30\"x3 On foam 30\"x3 On foam 30\"x3  On foam 30\"x3 On foam 30\"x3 On foam 30\"x3 On foam 30\"x3 On foam 30\"x3 On foam 30\"x3   SLS tramp ball toss   2x10  2x10 2x10  2x10 2x10 x30      Squats   2x10 2x10           Biodex LOS LOS, Maze control, WS 3x ea  LOS, Maze control, 2x ea LOS, Maze control, 2x ea  12  LOS static lvl easy/med 3x LOS static lvl easy/med 3x LOS static easy/med  3x LOS static easy/med  3x LOS static easy/med  3x LOS, Maze control, WS 3x ea   Legg press  44# 3x10 SL 44# 3x10 SL 44# 3x10 SL          Ther Ex         " "     Pt education on HEP, POC              Bike for ROM 8' 8' 8' 8'  8' 8' 8' 8' 8' 8'   Ankle ABCs Green therapad  1x15  ea      Steamboats on foam    nv Green therapad  1x10 ea     Green therapad  1x10 ea Green therapad  1x15  ea Green therapad  1x15  ea   Long sitting calf stretch Pro stretch   30\"x2 30''x3    3x30\" Pro stretch 30\" x2    Towel   3x30\" Pro stretch +step  30\"x2   Pro stretch +step  30\"x2 Pro stretch +step  30\"x2 Pro stretch +step  30\"x2   Ankle 4 way       GTB 3x10 ea GTB 3x10 ea HEP      Towel curls       2'        HR/TR seated c DB or KB 15#  30x ea HR/TR standing 2x10 ea HR/TR standing 2x10 ea HR/TR standing 2x10 ea  15# 20x ea 15# 20x ea 15#  20x ea 15#  30x ea 15#  30x ea 15#  30x ea          Plantar fascia towel stretch 15\"x5 PF towel stretch 15\"x5 PF towel stretch 15\"x5 PF towel stretch 15\"x5 PF towel stretch 15\"x5   Ther Activity                                          Gait Training                                          Modalities                     Cp 10' CP  x10' declines                     Scan     OR     Visit  [employer]  Access Code: [email or web address]    Access Code: [email or web address]  URL: [email or web address]  Date: 04/04/2024  Prepared by: Ladi Ramos    Exercises  - Single Leg Stance  - 1 x daily - 7 x weekly - 3 sets - 30 hold  - Seated Heel Toe Raises  - 1 x daily - 7 x weekly - 3 sets - 10 reps  - Seated Heel Raise  - 1 x daily - 7 x weekly - 3 sets - 10 reps  - Long Sitting Calf Stretch with Strap  - 1 x daily - 7 x weekly - 3 sets - 30 hold  - Long Sitting Plantar Fascia Stretch with Towel  - 1 x daily - 7 x weekly - 5 sets - 15 hold  - Towel Scrunches  - 1 x daily - 7 x weekly - 3 sets - 10 reps               "

## 2024-05-07 ENCOUNTER — OFFICE VISIT (OUTPATIENT)
Dept: PHYSICAL THERAPY | Facility: REHABILITATION | Age: 29
End: 2024-05-07
Payer: OTHER MISCELLANEOUS

## 2024-05-07 DIAGNOSIS — S93.492A SPRAIN OF ANTERIOR TALOFIBULAR LIGAMENT OF LEFT ANKLE, INITIAL ENCOUNTER: Primary | ICD-10-CM

## 2024-05-07 PROCEDURE — 97112 NEUROMUSCULAR REEDUCATION: CPT

## 2024-05-07 PROCEDURE — 97140 MANUAL THERAPY 1/> REGIONS: CPT

## 2024-05-07 PROCEDURE — 97110 THERAPEUTIC EXERCISES: CPT

## 2024-05-07 NOTE — PROGRESS NOTES
"Daily Note     Today's date: 2024  Patient name: Mercedes Lynne  : 1995  MRN: [id number]  Referring provider: Jermain Patricio D*  Dx:   Encounter Diagnosis     ICD-10-CM    1. Sprain of anterior talofibular ligament of left ankle, initial encounter  S93.492A           Start Time: 1030  Stop Time: 1125  Total time in clinic (min): 55 minutes    Subjective: Patient reports some soreness today on dorsum of foot along the extensor digitorum and in left anterior tibialis. States she did the same 4-5 mile walk but was walking quicker and feels that may have increased symptoms.       Objective: See treatment diary below      Assessment: Patient tolerated treatment well but very tender and some edema present on dorsum of toot along extensor digitorum. Patient able to complete all exercises outlined below without complaints, however reports discomfort with active inversion.  Pt will benefit from continued skilled PT intervention in order to address remaining limitations and to restore maximal function.         Plan: Continue per plan of care.      Precautions: No past medical history on file.    Manuals    Forefoot mobs       FOTO NV*       TC mobilizations              Ankle PROM   JG JG JG  JG SE [facility]                 Neuro Re-Ed              Rocker board A/P, M/L 30x SLS 2x10  SLS 2x10 SLS 2x10 SLS 2x10  20x ea 30x 30x 30x  30x    Standing marching on foam  30x ea  30x 30x 30x  30x ea 30x ea 30x ea 30x ea 30x ea   Hip abd, hip ext mat  3x10  3x10 3x10         Tandem ambulation  5x on foam      4x on foam 4x on foam 4x on foam 4x on foam 5x on foam   SLS On foam 30\"x3 On foam 30\"x3 On foam 30\"x3 On foam 30\"x3 On foam 30\"x3  On foam 30\"x3 On foam 30\"x3 On foam 30\"x3 On foam 30\"x3 On foam 30\"x3   SLS tramp ball toss   2x10  2x10 2x10 2x10  2x10 x30      Squats   2x10 2x10           Biodex LOS LOS, Maze control, WS 3x ea  LOS, Maze control, 2x ea LOS, " "Maze control, 2x ea  12 LOS, Maze control, 2x ea  12  LOS static lvl easy/med 3x LOS static easy/med  3x LOS static easy/med  3x LOS static easy/med  3x LOS, Maze control, WS 3x ea   Legg press  44# 3x10 SL 44# 3x10 SL 44# 3x10 SL 44# 3x10 SL         Ther Ex              Pt education on HEP, POC              Bike for ROM 8' 8' 8' 8' 8'  8' 8' 8' 8' 8'   Ankle ABCs Green therapad  1x15  ea      Steamboats on foam    nv Green therapad  1x10 ea     Green therapad  1x10 ea Green therapad  1x15  ea Green therapad  1x15  ea   Long sitting calf stretch Pro stretch   30\"x2 30''x3     Pro stretch 30\" x2    Towel   3x30\" Pro stretch +step  30\"x2   Pro stretch +step  30\"x2 Pro stretch +step  30\"x2 Pro stretch +step  30\"x2   Ankle 4 way        GTB 3x10 ea HEP      Towel curls               HR/TR seated c DB or KB 15#  30x ea HR/TR standing 2x10 ea HR/TR standing 2x10 ea HR/TR standing 2x10 ea HR/TR standing 2x10 ea  15# 20x ea 15#  20x ea 15#  30x ea 15#  30x ea 15#  30x ea          Plantar fascia towel stretch 15\"x5 PF towel stretch 15\"x5 PF towel stretch 15\"x5 PF towel stretch 15\"x5 PF towel stretch 15\"x5   Ther Activity                                          Gait Training                                          Modalities                     Cp 10' CP  x10' declines                     Scan     OR     Visit  [employer]  Access Code: [email or web address]    Access Code: [email or web address]  URL: [email or web address]  Date: 04/04/2024  Prepared by: Ladi Ramos    Exercises  - Single Leg Stance  - 1 x daily - 7 x weekly - 3 sets - 30 hold  - Seated Heel Toe Raises  - 1 x daily - 7 x weekly - 3 sets - 10 reps  - Seated Heel Raise  - 1 x daily - 7 x weekly - 3 sets - 10 reps  - Long Sitting Calf Stretch with Strap  - 1 x daily - 7 x weekly - 3 sets - 30 hold  - Long Sitting Plantar Fascia Stretch with Towel  - 1 x daily - 7 x weekly - 5 sets - 15 hold  - Towel Scrunches  - 1 x daily - 7 x weekly - 3 sets - 10 " reps

## 2024-05-09 ENCOUNTER — OFFICE VISIT (OUTPATIENT)
Dept: PHYSICAL THERAPY | Facility: REHABILITATION | Age: 29
End: 2024-05-09
Payer: OTHER MISCELLANEOUS

## 2024-05-09 DIAGNOSIS — S93.492A SPRAIN OF ANTERIOR TALOFIBULAR LIGAMENT OF LEFT ANKLE, INITIAL ENCOUNTER: Primary | ICD-10-CM

## 2024-05-09 PROCEDURE — 97110 THERAPEUTIC EXERCISES: CPT

## 2024-05-09 PROCEDURE — 97112 NEUROMUSCULAR REEDUCATION: CPT

## 2024-05-09 PROCEDURE — 97140 MANUAL THERAPY 1/> REGIONS: CPT

## 2024-05-09 NOTE — PROGRESS NOTES
"Daily Note     Today's date: 2024  Patient name: Mercedes Lynne  : 1995  MRN: [id number]  Referring provider: Jermain Patricio D*  Dx:   Encounter Diagnosis     ICD-10-CM    1. Sprain of anterior talofibular ligament of left ankle, initial encounter  S93.492A           Start Time: 0940  Stop Time: 1035  Total time in clinic (min): 55 minutes    Subjective: Patient reports mild improvement in pain since last visit but feels it is overall worsening since her last walk Monday. Continues to have arch support inserts in shoes.       Objective: See treatment diary below      Assessment: Exercises modified today due to patient's reports of discomfort. All exercises performed today within pain free range.  Balance exercises performed without sneaker today due to inserts possibly worsening L foot supination and worsening left lateral foot discomfort.       Plan: Continue per plan of care.      Precautions: No past medical history on file.    Manuals    Forefoot mobs              TC mobilizations              Ankle PROM  FH JG JG JG JG  SE FH [facility]                 Neuro Re-Ed              Rocker board A/P, M/L 30x SLS 2x10  SLS 2x10 SLS 2x10 SLS 2x10 B/l x20  30x 30x 30x  30x    Standing marching on foam  30x ea  30x 30x 30x Pro stretch 20\"x2  30x ea 30x ea 30x ea 30x ea   Hip abd, hip ext mat  3x10  3x10 3x10 3x10        Tandem ambulation  5x on foam     No sneakers 4x on foam  4x on foam 4x on foam 4x on foam 5x on foam   SLS On foam 30\"x3 On foam 30\"x3 On foam 30\"x3 On foam 30\"x3 On foam 30\"x3 No foam/no sneaker 30\"x3  On foam 30\"x3 On foam 30\"x3 On foam 30\"x3 On foam 30\"x3   SLS tramp ball toss   2x10  2x10 2x10 2x10   x30      Squats   2x10 2x10           Biodex LOS LOS, Maze control, WS 3x ea  LOS, Maze control, 2x ea LOS, Maze control, 2x ea  12 LOS, Maze control, 2x ea  12 np  LOS static easy/med  3x LOS static easy/med  3x LOS static easy/med  3x LOS, " "Maze control, WS 3x ea   Legg press  44# 3x10 SL 44# 3x10 SL 44# 3x10 SL 44# 3x10 SL         Ther Ex              Pt education on HEP, POC              Bike for ROM 8' 8' 8' 8' 8' 8'  8' 8' 8' 8'   Ankle ABCs Green therapad  1x15  ea       Green therapad  1x10 ea     Green therapad  1x10 ea Green therapad  1x15  ea Green therapad  1x15  ea   Long sitting calf stretch Pro stretch   30\"x2 30''x3      Pro stretch +step  30\"x2   Pro stretch +step  30\"x2 Pro stretch +step  30\"x2 Pro stretch +step  30\"x2   Ankle 4 way       BAPs x20 inv/ev PF/DF    X10 CCW CW  HEP      Towel curls       2'        HR/TR seated c DB or KB 15#  30x ea HR/TR standing 2x10 ea HR/TR standing 2x10 ea HR/TR standing 2x10 ea HR/TR standing 2x10 ea   15#  20x ea 15#  30x ea 15#  30x ea 15#  30x ea           PF towel stretch 15\"x5 PF towel stretch 15\"x5 PF towel stretch 15\"x5 PF towel stretch 15\"x5   Ther Activity                                          Gait Training                                          Modalities                      CP  x10' declines                     Scan     OR     Visit  [employer]  Access Code: [email or web address]    Access Code: [email or web address]  URL: [email or web address]  Date: 04/04/2024  Prepared by: Ladi Ramos    Exercises  - Single Leg Stance  - 1 x daily - 7 x weekly - 3 sets - 30 hold  - Seated Heel Toe Raises  - 1 x daily - 7 x weekly - 3 sets - 10 reps  - Seated Heel Raise  - 1 x daily - 7 x weekly - 3 sets - 10 reps  - Long Sitting Calf Stretch with Strap  - 1 x daily - 7 x weekly - 3 sets - 30 hold  - Long Sitting Plantar Fascia Stretch with Towel  - 1 x daily - 7 x weekly - 5 sets - 15 hold  - Towel Scrunches  - 1 x daily - 7 x weekly - 3 sets - 10 reps                   "

## 2024-05-14 ENCOUNTER — EVALUATION (OUTPATIENT)
Dept: PHYSICAL THERAPY | Facility: REHABILITATION | Age: 29
End: 2024-05-14
Payer: OTHER MISCELLANEOUS

## 2024-05-14 DIAGNOSIS — S93.492A SPRAIN OF ANTERIOR TALOFIBULAR LIGAMENT OF LEFT ANKLE, INITIAL ENCOUNTER: Primary | ICD-10-CM

## 2024-05-14 PROCEDURE — 97110 THERAPEUTIC EXERCISES: CPT

## 2024-05-14 PROCEDURE — 97140 MANUAL THERAPY 1/> REGIONS: CPT

## 2024-05-14 PROCEDURE — 97112 NEUROMUSCULAR REEDUCATION: CPT

## 2024-05-14 NOTE — PROGRESS NOTES
"PT Re-Evaluation     Today's date: 2024  Patient name: Mercedes Lynne  : 1995  MRN: [id number]  Referring provider: Jermain Patricio D*  Dx:   Encounter Diagnosis     ICD-10-CM    1. Sprain of anterior talofibular ligament of left ankle, initial encounter  S93.492A           Start Time: 0945  Stop Time: 1030  Total time in clinic (min): 45 minutes    Assessment  Assessment details: Mercedes Lynne is a pleasant 28 y.o. female who presents to OP PT for RE of  ankle sprain.  Pt has 4 week follow up with MD upcoming. In the past two weeks patient had trialed increasing walking volume, and reports abiltiy to amb 3-4 miles at a \"brisk pace\". She states after completing distance, she began having significant pain along anterior and lateral aspect of right ankle, when previously pain has been minimal, and pt had near full tolerance to all walking. PT educates signs and sx are consistantwith tendonitis, consistent with sudden increase in activity resulting in overuse of tendon, and was additionally instructed on proper graded return. Pt does presents with sig strength improvement for PF, INV, and DF compared to previous session. She additionally demonstrates normalizes ROM for all ranges excluding DF and eversion (slight regression with AROM in DF, improved after TC mobilization in standing). Pt would benefit from continued PT to further address deficits and improve return to PLOF.  Pt. will benefit from skilled PT services that includes manual therapy techniques to enhance tissue extensibility, neuromuscular re-education to facilitate motor control, therapeutic exercise to increase functional mobility, and modalities prn to reduce pain and inflammation.           Impairments: abnormal gait, abnormal or restricted ROM, abnormal movement, activity intolerance, impaired physical strength, lacks appropriate home exercise program and pain with function     Symptom irritability: lowUnderstanding of " Dx/Px/POC: good   Prognosis: good  Prognosis details: Positive prognostic indicators include positive attitude toward recovery and absence of observed red flags.  Negative prognostic indicators include none.       Goals  ST. Independent with HEP in 2 weeks (met)  2. Pt will have verbal report of improvement in symptoms by >/=25% in 2 weeks (met)     To be achieved by D/C   LT. Pt will be able to return to work without restrictions (progress)  2. Pt will be able to ascend/descend stairs with no difficulty (MET)  3. Pt will be able to return to yoga without restrictions (MET, until recent aggravation of sx)   4. Pt will be able to ambulate community distances with no difficulty (progress)        Plan  Patient would benefit from: skilled physical therapy  Planned therapy interventions: activity modification, manual therapy, motor coordination training, neuromuscular re-education, patient education, self care, therapeutic activities, therapeutic exercise, graded activity, home exercise program, graded exercise, functional ROM exercises and strengthening  Frequency: 2x week  Duration in weeks: 8  Plan of Care beginning date: 2024  Plan of Care expiration date: 2024  Treatment plan discussed with: patient        Subjective Evaluation     History of Present Illness  Mechanism of injury: Pt reports that on  she stepped off a step at work and landed of the ankle. She went to the ED and was told to stay off her foot until she saw podiatry. She saw podiatry last week and was cleared to do PT. 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 She notes that thing she has the most difficulty with is the push off with her toes.   Patient Goals  Patient goals for therapy: return to work and decreased pain  Patient goal: be able to stairs, be able to perform lifts of about 60-70lbs, be able to go for walks,  Pain  Current pain ratin  At best pain ratin  At  "worst pain ratin  Location: medial and lateral ankle, and top of foot  Aggravating factors: standing, walking and stair climbing  Progression: improved              Objective      Active Range of Motion   Left Ankle/Foot   Dorsiflexion (ke): 15 degrees   Plantar flexion: 64 degrees  Inversion: 40 degrees with pain  Eversion: 15 degrees with pain  Great toe extension: 30 degrees      Right Ankle/Foot   Dorsiflexion (ke): 2 degrees   Plantar flexion: 80 degrees   Inversion: 50 degrees   Eversion: 15 degrees   Great toe extension: 60 degrees      Passive Range of Motion   Left Ankle/Foot     Dorsiflexion (ke): 10 degrees   Plantar flexion: 70 degrees with   Inversion: 45 degrees   Eversion: 15 degrees   Great toe extension: 60 degrees      Right Ankle/Foot  Normal passive range of motion     Swelling   Left Ankle/Foot   WNL    STRENGTH:     Left ankle      EV: 4+/5   INV: 4/5  DF: 4+/5  PF: 5/5      FUNCTIONAL TESTING:   TM walkin.0 incline, 2.0 speed 5 min before onset of pain along fibularis as well as 5th digit pain recreated from pain previously with amb   Squatting, limited DF LLE compared to RLE (improved after TC mobilization in standing)  SLS 30 sec   Lateral step down NV  Single leg Heel raise 25 reps bilaterally       Plan: Continue per plan of care.      Precautions: No past medical history on file.    Manuals /   Forefoot mobs               TC mobilizations       Step down FH        Ankle PROM  FH JG JG JG JG   SE FH FH FH                  Neuro Re-Ed       Edu amb        Rocker board A/P, M/L 30x SLS 2x10  SLS 2x10 SLS 2x10 SLS 2x10 B/l x20   30x 30x 30x  30x    Standing marching on foam  30x ea  30x 30x 30x Pro stretch 20\"x2 NV  30x ea 30x ea 30x ea 30x ea   Hip abd, hip ext mat  3x10  3x10 3x10 3x10         Tandem ambulation  5x on foam     No sneakers 4x on foam NV  4x on foam 4x on foam 4x on foam 5x on foam   SLS On foam 30\"x3 On foam 30\"x3 On " "foam 30\"x3 On foam 30\"x3 On foam 30\"x3 No foam/no sneaker 30\"x3 NV  On foam 30\"x3 On foam 30\"x3 On foam 30\"x3 On foam 30\"x3   SLS tramp ball toss   2x10  2x10 2x10 2x10    x30      Squats   2x10 2x10            Biodex LOS LOS, Maze control, WS 3x ea  LOS, Maze control, 2x ea LOS, Maze control, 2x ea  12 LOS, Maze control, 2x ea  12 np   LOS static easy/med  3x LOS static easy/med  3x LOS static easy/med  3x LOS, Maze control, WS 3x ea   Legg press  44# 3x10 SL 44# 3x10 SL 44# 3x10 SL 44# 3x10 SL          Ther Ex               Pt education on HEP, POC               Bike for ROM 8' 8' 8' 8' 8' 8' TM 5' 2% incline, 2.0 speef  8' 8' 8' 8'   Ankle ABCs Green therapad  1x15  ea        Green therapad  1x10 ea     Green therapad  1x10 ea Green therapad  1x15  ea Green therapad  1x15  ea   Long sitting calf stretch Pro stretch   30\"x2 30''x3       Pro stretch +step  30\"x2   Pro stretch +step  30\"x2 Pro stretch +step  30\"x2 Pro stretch +step  30\"x2   Ankle 4 way       BAPs x20 inv/ev PF/DF    X10 CCW CW   HEP      Towel curls       2' Toe yoga, toe abd, ext, flex curls 2'        HR/TR seated c DB or KB 15#  30x ea HR/TR standing 2x10 ea HR/TR standing 2x10 ea HR/TR standing 2x10 ea HR/TR standing 2x10 ea  Standing TR 3x10  SL HR 3x10  15#  20x ea 15#  30x ea 15#  30x ea 15#  30x ea   Step overs       6'' for DF NV   PF towel stretch 15\"x5 PF towel stretch 15\"x5 PF towel stretch 15\"x5 PF towel stretch 15\"x5   Ther Activity                                             Gait Training                                             Modalities                        CP  x10' declines                      Scan     OR     Visit  [employer]  Access Code: [email or web address]    Access Code: [email or web address]  URL: [email or web address]  Date: 04/04/2024  Prepared by: Ladi Ramos    Exercises  - Single Leg Stance  - 1 x daily - 7 x weekly - 3 sets - 30 hold  - Seated Heel Toe Raises  - 1 x daily - 7 x weekly - 3 sets - 10 " reps  - Seated Heel Raise  - 1 x daily - 7 x weekly - 3 sets - 10 reps  - Long Sitting Calf Stretch with Strap  - 1 x daily - 7 x weekly - 3 sets - 30 hold  - Long Sitting Plantar Fascia Stretch with Towel  - 1 x daily - 7 x weekly - 5 sets - 15 hold  - Towel Scrunches  - 1 x daily - 7 x weekly - 3 sets - 10 reps

## 2024-05-16 ENCOUNTER — OFFICE VISIT (OUTPATIENT)
Dept: PHYSICAL THERAPY | Facility: REHABILITATION | Age: 29
End: 2024-05-16
Payer: OTHER MISCELLANEOUS

## 2024-05-16 DIAGNOSIS — S93.492A SPRAIN OF ANTERIOR TALOFIBULAR LIGAMENT OF LEFT ANKLE, INITIAL ENCOUNTER: Primary | ICD-10-CM

## 2024-05-16 PROCEDURE — 97112 NEUROMUSCULAR REEDUCATION: CPT

## 2024-05-16 PROCEDURE — 97110 THERAPEUTIC EXERCISES: CPT

## 2024-05-16 PROCEDURE — 97140 MANUAL THERAPY 1/> REGIONS: CPT

## 2024-05-16 NOTE — PROGRESS NOTES
Daily Note     Today's date: 2024  Patient name: Mercedes Lynne  : 1995  MRN: [id number]  Referring provider: Jermain Patricio D*  Dx:   Encounter Diagnosis     ICD-10-CM    1. Sprain of anterior talofibular ligament of left ankle, initial encounter  S93.492A           Start Time: 0943  Stop Time: 1024  Total time in clinic (min): 41 minutes    Subjective: Mercedes is doing well! Notes that she went for around a 40 minute walk yesterday - and it went well! No pain. Feels that she is continued to have difficulty walking at a normal pace because of the discomfort.       Objective: See treatment diary below      Assessment: Tolerated treatment well. Continued with initiation treadmill that started last visit, did well with this. Continues to have some limitations with DF and eversion during passive range evident. Continued with progressions made last session. Progressed to use of single UE for biodex activities with appropriate challenge noted after this change. Able to increase some repetitions to achieve progressive overload. Overall, Mercedes seems to be doing nicely, making solid improvements in overall motion and activity tolerance with decreased symptomology. Will benefit from continued development of ankle motion and strength with progression to more challenging postural positions and functional activities. Some mild discomfort with activities that require increased weightbearing dorsiflexion. Patient demonstrated fatigue post treatment, exhibited good technique with therapeutic exercises, and would benefit from continued PT      Plan: Continue per plan of care.  Progress treatment as tolerated.       Precautions: No past medical history on file.    Manuals  5/ 5/   Forefoot mobs        , + subtalar        TC mobilizations       Step down [facility]        Ankle PROM   JG JG JG JG  [facility]                  Neuro Re-Ed       Edu amb       "  [employer] board A/P, M/L 30x SLS 2x10  SLS 2x10 SLS 2x10 SLS 2x10 B/l x20    30x 30x  30x    Standing marching on foam  30x ea  30x 30x 30x Pro stretch 20\"x2 NV   30x ea 30x ea 30x ea   Hip abd, hip ext mat  3x10  3x10 3x10 3x10         Tandem ambulation  5x on foam     No sneakers 4x on foam NV   4x on foam 4x on foam 5x on foam   SLS On foam 30\"x3 On foam 30\"x3 On foam 30\"x3 On foam 30\"x3 On foam 30\"x3 No foam/no sneaker 30\"x3 NV   On foam 30\"x3 On foam 30\"x3 On foam 30\"x3   SLS tramp ball toss   2x10  2x10 2x10 2x10          Squats   2x10 2x10            Biodex LOS LOS, Maze control, WS 3x ea  LOS, Maze control, 2x ea LOS, Maze control, 2x ea  12 LOS, Maze control, 2x ea  12 np  LOS, Maze control 3x ea   LOS static easy/med  3x LOS static easy/med  3x LOS, Maze control, WS 3x ea   Legg press  44# 3x10 SL 44# 3x10 SL 44# 3x10 SL 44# 3x10 SL   44# 3x15 SL        Ther Ex               Pt education on HEP, POC               Bike for ROM 8' 8' 8' 8' 8' 8' TM 5' 2% incline, 2.0 speef TM 8' 2%, 2.0 mph  8' 8' 8'   Ankle ABCs Green therapad  1x15  ea         Green therapad  1x10 ea Green therapad  1x15  ea Green therapad  1x15  ea   Long sitting calf stretch Pro stretch   30\"x2 30''x3      Stand 10x10\"   Pro stretch +step  30\"x2 Pro stretch +step  30\"x2 Pro stretch +step  30\"x2   Ankle 4 way       BAPs x20 inv/ev PF/DF    X10 CCW CW  GTB 3x10        Towel curls       2' Toe yoga, toe abd, ext, flex curls 2'        HR/TR seated c DB or KB 15#  30x ea HR/TR standing 2x10 ea HR/TR standing 2x10 ea HR/TR standing 2x10 ea HR/TR standing 2x10 ea  Standing TR 3x10  SL HR 3x10 TR 3x10 stand,     SL HR 3x10   15#  30x ea 15#  30x ea 15#  30x ea   Lateral stepping band         GTB NV?       Step overs       6'' for DF NV  6\" x10 ea   PF towel stretch 15\"x5 PF towel stretch 15\"x5 PF towel stretch 15\"x5   Ther Activity                                             Gait Training                                             Modalities     "                    CP  x10' declines                      Scan     OR     Visit  Issue  Access Code: [email or web address]    Access Code: [email or web address]  URL: [email or web address]  Date: 04/04/2024  Prepared by: Ladi Ramos    Exercises  - Single Leg Stance  - 1 x daily - 7 x weekly - 3 sets - 30 hold  - Seated Heel Toe Raises  - 1 x daily - 7 x weekly - 3 sets - 10 reps  - Seated Heel Raise  - 1 x daily - 7 x weekly - 3 sets - 10 reps  - Long Sitting Calf Stretch with Strap  - 1 x daily - 7 x weekly - 3 sets - 30 hold  - Long Sitting Plantar Fascia Stretch with Towel  - 1 x daily - 7 x weekly - 5 sets - 15 hold  - Towel Scrunches  - 1 x daily - 7 x weekly - 3 sets - 10 reps

## 2024-05-20 ENCOUNTER — OFFICE VISIT (OUTPATIENT)
Dept: PODIATRY | Facility: CLINIC | Age: 29
End: 2024-05-20

## 2024-05-20 VITALS — SYSTOLIC BLOOD PRESSURE: 100 MMHG | HEART RATE: 91 BPM | DIASTOLIC BLOOD PRESSURE: 68 MMHG

## 2024-05-20 DIAGNOSIS — S93.492A SPRAIN OF ANTERIOR TALOFIBULAR LIGAMENT OF LEFT ANKLE, INITIAL ENCOUNTER: Primary | ICD-10-CM

## 2024-05-20 PROCEDURE — 99213 OFFICE O/P EST LOW 20 MIN: CPT | Performed by: PODIATRIST

## 2024-05-20 NOTE — LETTER
May 20, 2024     Patient: Mercedes Lynne  YOB: 1995  Date of Visit: 5/20/2024      To Whom it May Concern:    Mercedes Lynne is under my professional care. Mercedes was seen in my office on 5/20/2024. Mercedes has not been improving in the time frame I expected. I feel we need to take her out of work completely for 4-8 weeks. I will assess in 4 weeks. She is to be home , resting the ankle as much as possible. She is to continue PT as directed. .    If you have any questions or concerns, please don't hesitate to call.         Sincerely,          Jermain Patricio DPM        CC: No Recipients

## 2024-05-20 NOTE — PROGRESS NOTES
Ambulatory Visit  Name: Mercedes Lynne      : 1995      MRN: [id number]  Encounter Provider: Jermain Patricio DPM  Encounter Date: 2024   Encounter department: [facility] PODIATRY [place]    Assessment & Plan   1. Sprain of anterior talofibular ligament of left ankle, initial encounter      Diagnosis and options discussed with patient  Patient agreeable to the plan as stated below    Reviewed PT notes. At 1 month FOTO scored did not improve. I feel I have have to take her out of work for a month. Continue PT but otherwise rest and stretch the LLE.     Reassess in 4 weeks    History of Present Illness     Mercedes Lynne is a 28 y.o. female who presents to cherck her left foot and ankle. She sprained her foot last month. The ankle is feeling a lot bett but she is still getting numbness along the 4,5 toes and dorsal foot. She went on a long walk and was sore but it took a few days to go away.     Review of Systems  As stated in HPI, otherwise normal    Medical History Reviewed by provider this encounter:  Tobacco  Allergies  Meds  Problems  Med Hx  Surg Hx  Fam Hx       Objective     /68 (BP Location: Left arm, Patient Position: Sitting, Cuff Size: Standard)   Pulse 91     Physical Exam  Vitals reviewed.   Cardiovascular:      Rate and Rhythm: Normal rate.      Pulses: Normal pulses.   Pulmonary:      Effort: Pulmonary effort is normal. No respiratory distress.   Musculoskeletal:         General: No deformity.      Left ankle: No swelling, deformity, ecchymosis or lacerations. Tenderness present over the ATF ligament. Decreased range of motion.      Left Achilles Tendon: No tenderness or defects. Webb's test negative.        Legs:    Skin:     General: Skin is warm.      Findings: No erythema or rash.   Neurological:      Mental Status: She is alert.      Sensory: No sensory deficit.     Administrative Statements

## 2024-05-21 ENCOUNTER — OFFICE VISIT (OUTPATIENT)
Dept: PHYSICAL THERAPY | Facility: REHABILITATION | Age: 29
End: 2024-05-21
Payer: OTHER MISCELLANEOUS

## 2024-05-21 DIAGNOSIS — S93.492A SPRAIN OF ANTERIOR TALOFIBULAR LIGAMENT OF LEFT ANKLE, INITIAL ENCOUNTER: Primary | ICD-10-CM

## 2024-05-21 PROCEDURE — 97110 THERAPEUTIC EXERCISES: CPT

## 2024-05-21 PROCEDURE — 97112 NEUROMUSCULAR REEDUCATION: CPT

## 2024-05-21 PROCEDURE — 97140 MANUAL THERAPY 1/> REGIONS: CPT

## 2024-05-21 NOTE — PROGRESS NOTES
"Daily Note     Today's date: 2024  Patient name: Mercedes Lynne  : 1995  MRN: [id number]  Referring provider: Jermain Patricio D*  Dx:   Encounter Diagnosis     ICD-10-CM    1. Sprain of anterior talofibular ligament of left ankle, initial encounter  S93.492A           Start Time: 0940  Stop Time: 1023  Total time in clinic (min): 43 minutes    Subjective: Patient reports no new complaints. States she feels ankle fatigues quickly when walking.       Objective: See treatment diary below      Assessment: Tolerated treatment well. Continued with initiation treadmill, did well with this. Continues to have some limitations with DF and eversion during passive range evident. Continued with progressions made last session.  Patient had difficulty with tandem amb on foam today but improved with repetitions. Will benefit from continued development of ankle motion and strength with progression to more challenging postural positions and functional activities. Some mild discomfort with activities that require increased weightbearing dorsiflexion. Patient demonstrated fatigue post treatment, exhibited good technique with therapeutic exercises, and would benefit from continued PT       Plan: Continue per plan of care.      Precautions: No past medical history on file.    Manuals  5/ 5/ 5   Forefoot mobs        , + subtalar        TC mobilizations       Step down [facility]        Ankle PROM   J [facility]  [facility]                  Neuro Re-Ed       Edu amb        Rocker board A/P, M/L 30x SLS 2x10  SLS 2x10 SLS 2x10 SLS 2x10 B/l x20     30x  30x    Standing marching on foam  30x ea  30x 30x 30x Pro stretch 20\"x2 NV    30x ea 30x ea   Hip abd, hip ext mat  3x10  3x10 3x10 3x10         Tandem ambulation  5x on foam     No sneakers 4x on foam NV  5x on foam no sneakers no UE   4x on foam 5x on foam   SLS On foam 30\"x3 On foam 30\"x3 On foam 30\"x3 On foam 30\"x3 On " "foam 30\"x3 No foam/no sneaker 30\"x3 NV  No foam/no sneaker 30\"x3  On foam 30\"x3 On foam 30\"x3   SLS tramp ball toss   2x10  2x10 2x10 2x10          Squats   2x10 2x10            Biodex LOS LOS, Maze control, WS 3x ea  LOS, Maze control, 2x ea LOS, Maze control, 2x ea  12 LOS, Maze control, 2x ea  12 np  LOS, Maze control 3x ea  LOS, Maze control 3x ea   LOS static easy/med  3x LOS, Maze control, WS 3x ea   Legg press  44# 3x10 SL 44# 3x10 SL 44# 3x10 SL 44# 3x10 SL   44# 3x15 SL  44# 3x15 SL       Ther Ex               Pt education on HEP, POC               Bike for ROM 8' 8' 8' 8' 8' 8' TM 5' 2% incline, 2.0 speef TM 8' 2%, 2.0 mph TM 8' 2%, 2.0 mph  8' 8'   Ankle ABCs Green therapad  1x15  ea          Green therapad  1x15  ea Green therapad  1x15  ea   Long sitting calf stretch Pro stretch   30\"x2 30''x3      Stand 10x10\"  Stand 10x10\"   Pro stretch +step  30\"x2 Pro stretch +step  30\"x2   Ankle 4 way       BAPs x20 inv/ev PF/DF    X10 CCW CW  GTB 3x10        Towel curls       2' Toe yoga, toe abd, ext, flex curls 2'  Toe yoga, toe abd, ext, flex curls 2'      HR/TR seated c DB or KB 15#  30x ea HR/TR standing 2x10 ea HR/TR standing 2x10 ea HR/TR standing 2x10 ea HR/TR standing 2x10 ea  Standing TR 3x10  SL HR 3x10 TR 3x10 stand,     SL HR 3x10  TR 3x10 stand,     SL HR 3x10   15#  30x ea 15#  30x ea   Lateral stepping band         GTB NV? GTB 3 laps      Step overs       6'' for DF NV  6\" x10 ea  6\" x10 ea   PF towel stretch 15\"x5 PF towel stretch 15\"x5   Ther Activity                                             Gait Training                                             Modalities                                               Scan     OR     Visit  Jaree  Access Code: [email or web address]    Access Code: [email or web address]  URL: [email or web address]  Date: 04/04/2024  Prepared by: Ladi Ramos    Exercises  - Single Leg Stance  - 1 x daily - 7 x weekly - 3 sets - 30 hold  - Seated Heel Toe Raises  - " 1 x daily - 7 x weekly - 3 sets - 10 reps  - Seated Heel Raise  - 1 x daily - 7 x weekly - 3 sets - 10 reps  - Long Sitting Calf Stretch with Strap  - 1 x daily - 7 x weekly - 3 sets - 30 hold  - Long Sitting Plantar Fascia Stretch with Towel  - 1 x daily - 7 x weekly - 5 sets - 15 hold  - Towel Scrunches  - 1 x daily - 7 x weekly - 3 sets - 10 reps

## 2024-05-23 ENCOUNTER — OFFICE VISIT (OUTPATIENT)
Dept: PHYSICAL THERAPY | Facility: REHABILITATION | Age: 29
End: 2024-05-23
Payer: OTHER MISCELLANEOUS

## 2024-05-23 DIAGNOSIS — S93.492A SPRAIN OF ANTERIOR TALOFIBULAR LIGAMENT OF LEFT ANKLE, INITIAL ENCOUNTER: Primary | ICD-10-CM

## 2024-05-23 PROCEDURE — 97112 NEUROMUSCULAR REEDUCATION: CPT

## 2024-05-23 PROCEDURE — 97140 MANUAL THERAPY 1/> REGIONS: CPT

## 2024-05-23 PROCEDURE — 97110 THERAPEUTIC EXERCISES: CPT

## 2024-05-23 NOTE — PROGRESS NOTES
"Daily Note     Today's date: 2024  Patient name: Mercedes Lynne  : 1995  MRN: [id number]  Referring provider: Jermain Patricio D*  Dx:   Encounter Diagnosis     ICD-10-CM    1. Sprain of anterior talofibular ligament of left ankle, initial encounter  S93.492A                      Subjective: Patient reports no pain after last visit, however she states that she woke up yesterday and felt numbness and weakness in left ankle. States she tried to stretch her left ankle yesterday but felt like it was \"stuck\" and could not dorsiflex.       Objective: See treatment diary below      Assessment: Patient tolerated treatment well. Decreased DF noted with toe raises and step downs today. Self DF mob with theraband and soleus stretch added today without complaints. Brief STM to gastroc/achilles performed today without complaints. Patient reports increased mobilization post treatment. Pt will benefit from continued skilled PT intervention in order to address remaining limitations and to restore maximal function.         Plan: Continue per plan of care.      Precautions: No past medical history on file.    Manuals    Forefoot mobs        , + subtalar   Glides JG      TC mobilizations       Step down [facility]   Brief STM gastroc     Ankle PROM   [facility]  [facility]                  Neuro Re-Ed       Edu amb        Rocker board A/P, M/L 30x SLS 2x10  SLS 2x10 SLS 2x10 SLS 2x10 B/l x20      30x    Standing marching on foam  30x ea  30x 30x 30x Pro stretch 20\"x2 NV   Pro stretch 30\"x3  30x ea   Hip abd, hip ext mat  3x10  3x10 3x10 3x10    Self DF mob with TB x10     Tandem ambulation  5x on foam     No sneakers 4x on foam NV  5x on foam no sneakers no UE  5x on foam no sneakers no UE   5x on foam   SLS On foam 30\"x3 On foam 30\"x3 On foam 30\"x3 On foam 30\"x3 On foam 30\"x3 No foam/no sneaker 30\"x3 NV  No foam/no sneaker 30\"x3 No foam/no sneaker 30\"x3  On " "foam 30\"x3   SLS tramp ball toss   2x10  2x10 2x10 2x10          Squats   2x10 2x10            Biodex LOS LOS, Maze control, WS 3x ea  LOS, Maze control, 2x ea LOS, Maze control, 2x ea  12 LOS, Maze control, 2x ea  12 np  LOS, Maze control 3x ea  LOS, Maze control 3x ea  LOS, Maze control 3x ea   LOS, Maze control, WS 3x ea   Legg press  44# 3x10 SL 44# 3x10 SL 44# 3x10 SL 44# 3x10 SL   44# 3x15 SL  44# 3x15 SL  44# 3x15 SL      Ther Ex               Pt education on HEP, POC               Bike for ROM 8' 8' 8' 8' 8' 8' TM 5' 2% incline, 2.0 speef TM 8' 2%, 2.0 mph TM 8' 2%, 2.0 mph Bike 8'  8'   Ankle ABCs Green therapad  1x15  ea           Green therapad  1x15  ea   Long sitting calf stretch Pro stretch   30\"x2 30''x3      Stand 10x10\"  Stand 10x10\"  Soleus stretch 20\"x3  Pro stretch +step  30\"x2   Ankle 4 way       BAPs x20 inv/ev PF/DF    X10 CCW CW  GTB 3x10        Towel curls       2' Toe yoga, toe abd, ext, flex curls 2'  Toe yoga, toe abd, ext, flex curls 2'      HR/TR seated c DB or KB 15#  30x ea HR/TR standing 2x10 ea HR/TR standing 2x10 ea HR/TR standing 2x10 ea HR/TR standing 2x10 ea  Standing TR 3x10  SL HR 3x10 TR 3x10 stand,     SL HR 3x10  TR 3x10 stand,     SL HR 3x10  TR 3x10 stand,     SL HR 3x10   15#  30x ea   Lateral stepping band         GTB NV? GTB 3 laps      Step overs       6'' for DF NV  6\" x10 ea  6\" x10 ea  Fwd step downs  6\" x15  PF towel stretch 15\"x5   Ther Activity                                             Gait Training                                             Modalities                                               Scan     OR     Visit  [email or web address]  Access Code: [email or web address]    Access Code: [email or web address]  URL: [email or web address]  Date: 04/04/2024  Prepared by: Ladi Ramos    Exercises  - Single Leg Stance  - 1 x daily - 7 x weekly - 3 sets - 30 hold  - Seated Heel Toe Raises  - 1 x daily - 7 x weekly - 3 sets - 10 reps  - Seated Heel Raise  - 1 x " daily - 7 x weekly - 3 sets - 10 reps  - Long Sitting Calf Stretch with Strap  - 1 x daily - 7 x weekly - 3 sets - 30 hold  - Long Sitting Plantar Fascia Stretch with Towel  - 1 x daily - 7 x weekly - 5 sets - 15 hold  - Towel Scrunches  - 1 x daily - 7 x weekly - 3 sets - 10 reps

## 2024-05-24 ENCOUNTER — OFFICE VISIT (OUTPATIENT)
Dept: URGENT CARE | Age: 29
End: 2024-05-24
Payer: COMMERCIAL

## 2024-05-24 VITALS
RESPIRATION RATE: 18 BRPM | HEART RATE: 64 BPM | OXYGEN SATURATION: 98 % | SYSTOLIC BLOOD PRESSURE: 122 MMHG | HEIGHT: 65 IN | WEIGHT: 162 LBS | BODY MASS INDEX: 26.99 KG/M2 | TEMPERATURE: 97.1 F | DIASTOLIC BLOOD PRESSURE: 78 MMHG

## 2024-05-24 DIAGNOSIS — N39.0 URINARY TRACT INFECTION WITH HEMATURIA, SITE UNSPECIFIED: ICD-10-CM

## 2024-05-24 DIAGNOSIS — R35.0 URINARY FREQUENCY: Primary | ICD-10-CM

## 2024-05-24 DIAGNOSIS — R31.9 URINARY TRACT INFECTION WITH HEMATURIA, SITE UNSPECIFIED: ICD-10-CM

## 2024-05-24 LAB
SL AMB  POCT GLUCOSE, UA: NEGATIVE
SL AMB LEUKOCYTE ESTERASE,UA: ABNORMAL
SL AMB POCT BILIRUBIN,UA: NEGATIVE
SL AMB POCT BLOOD,UA: ABNORMAL
SL AMB POCT CLARITY,UA: ABNORMAL
SL AMB POCT COLOR,UA: YELLOW
SL AMB POCT KETONES,UA: NEGATIVE
SL AMB POCT NITRITE,UA: NEGATIVE
SL AMB POCT PH,UA: 5
SL AMB POCT SPECIFIC GRAVITY,UA: 1.01
SL AMB POCT URINE PROTEIN: 100
SL AMB POCT UROBILINOGEN: 0.2

## 2024-05-24 PROCEDURE — 87186 SC STD MICRODIL/AGAR DIL: CPT | Performed by: EMERGENCY MEDICINE

## 2024-05-24 PROCEDURE — 99214 OFFICE O/P EST MOD 30 MIN: CPT | Performed by: EMERGENCY MEDICINE

## 2024-05-24 PROCEDURE — 87086 URINE CULTURE/COLONY COUNT: CPT | Performed by: EMERGENCY MEDICINE

## 2024-05-24 PROCEDURE — 81002 URINALYSIS NONAUTO W/O SCOPE: CPT | Performed by: EMERGENCY MEDICINE

## 2024-05-24 PROCEDURE — 87077 CULTURE AEROBIC IDENTIFY: CPT | Performed by: EMERGENCY MEDICINE

## 2024-05-24 RX ORDER — CEPHALEXIN 500 MG/1
500 CAPSULE ORAL 2 TIMES DAILY
Qty: 14 CAPSULE | Refills: 0 | Status: SHIPPED | OUTPATIENT
Start: 2024-05-24 | End: 2024-05-31

## 2024-05-24 NOTE — PROGRESS NOTES
[facility]        NAME: Mercedes Lynne is a 28 y.o. female  : 1995    MRN: [id number]  DATE: May 24, 2024  TIME: 12:30 PM    Assessment and Plan   Urinary frequency [R35.0]  1. Urinary frequency  POCT urine dip    Urine culture    Urine culture      2. Urinary tract infection with hematuria, site unspecified  cephalexin (KEFLEX) 500 mg capsule            Patient Instructions       Follow up with PCP in 3-5 days.  Proceed to  ER if symptoms worsen.    If tests have been performed at [facility], our office will contact you with results if changes need to be made to the care plan discussed with you at the visit.  You can review your full results on [facility] MyChart.    Chief Complaint     Chief Complaint   Patient presents with    Possible UTI     Pt states she has the urge to urinate pretty frequently but is unable to get started when she goes to the bathroom.  When she is able to void she only goes a very little amount.  Painful across lower abdomen radiating up left abdominal wall.          History of Present Illness       28-year-old previously female presents with chief complaint of urinary hesitancy, frequency and sensation of incomplete emptying with onset of yesterday.  Patient also endorses some mild localized suprapubic discomfort.  She denies fever, rigors or vomiting.  Additionally, denies current pregnancy, vaginal discharge or vaginal bleeding.  States that she has a Mirena IUD and typically does not experience menstrual periods.    Urinary Tract Infection   This is a new problem. The current episode started yesterday. The problem occurs every urination. The problem has been waxing and waning. The quality of the pain is described as aching. The pain is at a severity of 2/10. The pain is mild. There has been no fever. Associated symptoms include frequency, hesitancy and urgency. Pertinent negatives include no chills, discharge, flank pain, hematuria, nausea, possible pregnancy,  sweats or vomiting. She has tried nothing for the symptoms.       Review of Systems   Review of Systems   Constitutional:  Negative for activity change, appetite change, chills, diaphoresis, fatigue, fever and unexpected weight change.   HENT:  Negative for congestion, dental problem, drooling, ear discharge, ear pain, facial swelling, hearing loss, mouth sores, nosebleeds, postnasal drip, rhinorrhea, sinus pressure, sinus pain, sneezing, sore throat, tinnitus, trouble swallowing and voice change.    Eyes:  Negative for pain and visual disturbance.   Respiratory:  Negative for cough and shortness of breath.    Cardiovascular:  Negative for chest pain and palpitations.   Gastrointestinal:  Negative for abdominal pain, nausea and vomiting.   Genitourinary:  Positive for decreased urine volume, difficulty urinating, dysuria, frequency, hesitancy and urgency. Negative for dyspareunia, enuresis, flank pain, genital sores, hematuria, menstrual problem, pelvic pain, vaginal bleeding, vaginal discharge and vaginal pain.   Musculoskeletal:  Negative for arthralgias, back pain, gait problem, joint swelling, myalgias, neck pain and neck stiffness.   Skin:  Negative for color change and rash.   Neurological:  Negative for dizziness, tremors, seizures, syncope, facial asymmetry, speech difficulty, weakness, light-headedness, numbness and headaches.   All other systems reviewed and are negative.        Current Medications       Current Outpatient Medications:     cephalexin (KEFLEX) 500 mg capsule, Take 1 capsule (500 mg total) by mouth 2 (two) times a day for 7 days, Disp: 14 capsule, Rfl: 0    acetaminophen (TYLENOL) 325 mg tablet, Take 650 mg by mouth every 6 (six) hours as needed for moderate pain. Indications: Pain (Patient not taking: Reported on 5/24/2024), Disp: , Rfl:     acetaminophen (TYLENOL) 500 mg tablet, Take 1 tablet (500 mg total) by mouth every 6 (six) hours as needed for mild pain or moderate pain (Patient not  "taking: Reported on 5/24/2024), Disp: 30 tablet, Rfl: 0    Current Allergies     Allergies as of 05/24/2024    (No Known Allergies)            The following portions of the patient's history were reviewed and updated as appropriate: allergies, current medications, past family history, past medical history, past social history, past surgical history and problem list.     Past Medical History:   Diagnosis Date    History of sprained ankle     history of sprained left foot       Past Surgical History:   Procedure Laterality Date    HERNIA REPAIR      WOUND DEBRIDEMENT N/A 04/25/2023    Procedure: EXCISIONAL DEBRIDEMENT UMBILICAL HERNIA;  Surgeon: Octavia Shetty MD;  Location: [facility];  Service: General       Family History   Problem Relation Age of Onset    Hypertension Mother     Diabetes Family     Hypertension Family     Cancer Family          Medications have been verified.        Objective   /78 (BP Location: Left arm, Patient Position: Sitting)   Pulse 64   Temp (!) 97.1 °F (36.2 °C) (Tympanic)   Resp 18   Ht 5' 5\" (1.651 m)   Wt 73.5 kg (162 lb)   SpO2 98%   BMI 26.96 kg/m²   No LMP recorded. Patient has had an implant.       Physical Exam     Physical Exam  Vitals and nursing note reviewed.   Constitutional:       General: She is not in acute distress.     Appearance: Normal appearance. She is normal weight. She is not ill-appearing, toxic-appearing or diaphoretic.   HENT:      Head: Normocephalic and atraumatic.      Right Ear: External ear normal.      Left Ear: External ear normal.      Nose: Nose normal. No congestion or rhinorrhea.      Mouth/Throat:      Mouth: Mucous membranes are moist.      Pharynx: No oropharyngeal exudate or posterior oropharyngeal erythema.   Eyes:      General: No scleral icterus.        Right eye: No discharge.         Left eye: No discharge.      Extraocular Movements: Extraocular movements intact.      Pupils: Pupils are equal, round, and reactive to light. "   Neck:      Vascular: No carotid bruit.   Cardiovascular:      Rate and Rhythm: Normal rate and regular rhythm.      Pulses: Normal pulses.           Carotid pulses are 2+ on the right side and 2+ on the left side.       Radial pulses are 2+ on the right side and 2+ on the left side.      Heart sounds: No murmur heard.     No friction rub. No gallop.   Pulmonary:      Effort: Pulmonary effort is normal. No respiratory distress.      Breath sounds: Normal breath sounds. No stridor. No wheezing, rhonchi or rales.   Chest:      Chest wall: No tenderness.   Abdominal:      General: Abdomen is flat. There is no distension or abdominal bruit. There are no signs of injury.      Palpations: Abdomen is soft. There is no mass.      Tenderness: There is abdominal tenderness in the suprapubic area. There is no right CVA tenderness, left CVA tenderness, guarding or rebound. Negative signs include Meza's sign, Rovsing's sign, McBurney's sign, psoas sign and obturator sign.      Hernia: No hernia is present.      Comments: Mild, suprapubic discomfort noted to palpation   Musculoskeletal:         General: No swelling, tenderness, deformity or signs of injury.      Cervical back: Normal range of motion and neck supple. No rigidity or tenderness.      Right lower leg: No edema.      Left lower leg: No edema.   Lymphadenopathy:      Cervical: No cervical adenopathy.   Skin:     General: Skin is warm and dry.   Neurological:      General: No focal deficit present.      Mental Status: She is alert and oriented to person, place, and time.   Psychiatric:         Mood and Affect: Mood normal.         Behavior: Behavior normal.

## 2024-05-26 LAB
BACTERIA UR CULT: ABNORMAL
BACTERIA UR CULT: ABNORMAL

## 2024-05-28 ENCOUNTER — OFFICE VISIT (OUTPATIENT)
Dept: PHYSICAL THERAPY | Facility: REHABILITATION | Age: 29
End: 2024-05-28
Payer: OTHER MISCELLANEOUS

## 2024-05-28 DIAGNOSIS — S93.492A SPRAIN OF ANTERIOR TALOFIBULAR LIGAMENT OF LEFT ANKLE, INITIAL ENCOUNTER: Primary | ICD-10-CM

## 2024-05-28 PROCEDURE — 97112 NEUROMUSCULAR REEDUCATION: CPT

## 2024-05-28 PROCEDURE — 97140 MANUAL THERAPY 1/> REGIONS: CPT

## 2024-05-28 PROCEDURE — 97110 THERAPEUTIC EXERCISES: CPT

## 2024-05-28 NOTE — PROGRESS NOTES
"Daily Note     Today's date: 2024  Patient name: Mercedes Lynne  : 1995  MRN: [id number]  Referring provider: Jermain Patricio D*  Dx:   Encounter Diagnosis     ICD-10-CM    1. Sprain of anterior talofibular ligament of left ankle, initial encounter  S93.492A           Start Time: 0935  Stop Time: 1030  Total time in clinic (min): 55 minutes    Subjective: Patient states she had some discomfort yesterday after wearing boots with no arch support but otherwise would say her ankle pain is 1-2/10. \"Its more tight than anything.\"  States she feels tightness in left anterior tib.       Objective: See treatment diary below      Assessment: Patient continues to tolerate treatment well. IASTM to left anterior tib added today due to patient's complaints. Pt will benefit from continued skilled PT intervention in order to address remaining limitations and to restore maximal function. Slight decrease in symptoms reported post treatment.         Plan: Continue per plan of care.      Precautions: No past medical history on file.    Manuals     Forefoot mobs        , + subtalar   Glides JG  Glides JG    TC mobilizations       Step down [facility]   Brief STM gastroc IASTM left anterior tib JG    Ankle PROM   JG JG JG JG    JG JG JG                   Neuro Re-Ed       Edu amb        [facility] board A/P, M/L 30x SLS 2x10  SLS 2x10 SLS 2x10 SLS 2x10 B/l x20         Standing marching on foam  30x ea  30x 30x 30x Pro stretch 20\"x2 NV   Pro stretch 30\"x3 Pro stretch 30\"x3    Hip abd, hip ext mat  3x10  3x10 3x10 3x10    Self DF mob with TB x10 Self DF mob with TB x10    Tandem ambulation  5x on foam     No sneakers 4x on foam NV  5x on foam no sneakers no UE  5x on foam no sneakers no UE  5x on foam no sneakers no UE     SLS On foam 30\"x3 On foam 30\"x3 On foam 30\"x3 On foam 30\"x3 On foam 30\"x3 No foam/no sneaker 30\"x3 NV  No foam/no sneaker 30\"x3 No foam/no sneaker " "30\"x3 Green pad/ no sneaker 30\"x3     SLS tramp ball toss   2x10  2x10 2x10 2x10          Squats   2x10 2x10            Biodex LOS LOS, Maze control, WS 3x ea  LOS, Maze control, 2x ea LOS, Maze control, 2x ea  12 LOS, Maze control, 2x ea  12 np  LOS, Maze control 3x ea  LOS, Maze control 3x ea  LOS, Maze control 3x ea      Legg press  44# 3x10 SL 44# 3x10 SL 44# 3x10 SL 44# 3x10 SL   44# 3x15 SL  44# 3x15 SL  44# 3x15 SL  44# 3x15 SL     Ther Ex               Pt education on HEP, POC               Bike for ROM 8' 8' 8' 8' 8' 8' TM 5' 2% incline, 2.0 speef TM 8' 2%, 2.0 mph TM 8' 2%, 2.0 mph Bike 8' Bike 8'    Ankle ABCs Green therapad  1x15  ea              Long sitting calf stretch Pro stretch   30\"x2 30''x3      Stand 10x10\"  Stand 10x10\"  Soleus stretch 20\"x3 Soleus stretch 20\"x3    Ankle 4 way       BAPs x20 inv/ev PF/DF    X10 CCW CW  GTB 3x10        Towel curls       2' Toe yoga, toe abd, ext, flex curls 2'  Toe yoga, toe abd, ext, flex curls 2'  Toe yoga, toe abd, ext, flex curls    HR/TR seated c DB or KB 15#  30x ea HR/TR standing 2x10 ea HR/TR standing 2x10 ea HR/TR standing 2x10 ea HR/TR standing 2x10 ea  Standing TR 3x10  SL HR 3x10 TR 3x10 stand,     SL HR 3x10  TR 3x10 stand,     SL HR 3x10  TR 3x10 stand,     SL HR 3x10  TR 3x10 stand,     SL HR 3x10    Lateral stepping band         GTB NV? GTB 3 laps      Step overs       6'' for DF NV  6\" x10 ea  6\" x10 ea  Fwd step downs  6\" x15 Fwd step downs  6\" x15    Ther Activity                                             Gait Training                                             Modalities                                               Scan     OR     Visit  [email or web address]  Access Code: [email or web address]    Access Code: [email or web address]  URL: [email or web address]  Date: 04/04/2024  Prepared by: Ladi Ramos    Exercises  - Single Leg Stance  - 1 x daily - 7 x weekly - 3 sets - 30 hold  - Seated Heel Toe Raises  - 1 x daily - 7 x weekly - 3 sets - " 10 reps  - Seated Heel Raise  - 1 x daily - 7 x weekly - 3 sets - 10 reps  - Long Sitting Calf Stretch with Strap  - 1 x daily - 7 x weekly - 3 sets - 30 hold  - Long Sitting Plantar Fascia Stretch with Towel  - 1 x daily - 7 x weekly - 5 sets - 15 hold  - Towel Scrunches  - 1 x daily - 7 x weekly - 3 sets - 10 reps

## 2024-05-30 ENCOUNTER — OFFICE VISIT (OUTPATIENT)
Dept: PHYSICAL THERAPY | Facility: REHABILITATION | Age: 29
End: 2024-05-30
Payer: OTHER MISCELLANEOUS

## 2024-05-30 DIAGNOSIS — S93.492A SPRAIN OF ANTERIOR TALOFIBULAR LIGAMENT OF LEFT ANKLE, INITIAL ENCOUNTER: Primary | ICD-10-CM

## 2024-05-30 PROCEDURE — 97140 MANUAL THERAPY 1/> REGIONS: CPT

## 2024-05-30 PROCEDURE — 97112 NEUROMUSCULAR REEDUCATION: CPT

## 2024-05-30 PROCEDURE — 97110 THERAPEUTIC EXERCISES: CPT

## 2024-05-30 NOTE — PROGRESS NOTES
"Daily Note     Today's date: 2024  Patient name: Mercedes Lynne  : 1995  MRN: [id number]  Referring provider: Jermain Patricio D*  Dx:   Encounter Diagnosis     ICD-10-CM    1. Sprain of anterior talofibular ligament of left ankle, initial encounter  S93.492A           Start Time: 0945  Stop Time: 1030  Total time in clinic (min): 45 minutes    Subjective: Pt denies any medical updates, and she reports pain has been mild today.       Objective: See treatment diary below      Assessment: Tolerated treatment well. Patient demonstrated fatigue post treatment, exhibited good technique with therapeutic exercises, and would benefit from continued PT      Plan: Continue per plan of care.      Precautions: No past medical history on file.    Manuals    Forefoot mobs   , + subtalar   Glides JG  Glides J Glides    TC mobilizations  Step down [facility]   Brief STM gastroc IASTM left anterior tib JG IASTM left anterior tib    Ankle PROM JG    JG JG JG              Neuro Re-Ed  Edu amb        Rocker board A/P, M/L B/l x20      SLS x15    Standing marching on foam  Pro stretch 20\"x2 NV   Pro stretch 30\"x3 Pro stretch 30\"x3 Pro stretch 30\"x3   Hip abd, hip ext mat 3x10    Self DF mob with TB x10 Self DF mob with TB x10 With PT x10   Tandem ambulation  No sneakers 4x on foam NV  5x on foam no sneakers no UE  5x on foam no sneakers no UE  5x on foam no sneakers no UE  NV   SLS No foam/no sneaker 30\"x3 NV  No foam/no sneaker 30\"x3 No foam/no sneaker 30\"x3 Green pad/ no sneaker 30\"x3  NV   SLS tramp ball toss           Squats           Biodex LOS np  LOS, Maze control 3x ea  LOS, Maze control 3x ea  LOS, Maze control 3x ea      Leg press   44# 3x15 SL  44# 3x15 SL  44# 3x15 SL  44# 3x15 SL  NV   Ther Ex          Pt education on HEP, POC          Bike for ROM 8' TM 5' 2% incline, 2.0 speef TM 8' 2%, 2.0 mph TM 8' 2%, 2.0 mph Bike 8' Bike 8' Bike 8'   Star slider       5 ea " "  Ankle ABCs          Long sitting calf stretch   Stand 10x10\"  Stand 10x10\"  Soleus stretch 20\"x3 Soleus stretch 20\"x3    Ankle 4 way  BAPs x20 inv/ev PF/DF    X10 CCW CW  GTB 3x10        Towel curls  2' Toe yoga, toe abd, ext, flex curls 2'  Toe yoga, toe abd, ext, flex curls 2'  Toe yoga, toe abd, ext, flex curls    HR/TR seated c DB or KB  Standing TR 3x10  SL HR 3x10 TR 3x10 stand,     SL HR 3x10  TR 3x10 stand,     SL HR 3x10  TR 3x10 stand,     SL HR 3x10  TR 3x10 stand,     SL HR 3x10 TR 3x10 stand,     SL HR 3x10   Lateral stepping band    GTB NV? GTB 3 laps      Step overs  6'' for DF NV  6\" x10 ea  6\" x10 ea  Fwd step downs  6\" x15 Fwd step downs  6\" x15 4''x20 step overs   Ther Activity                              Gait Training                              Modalities                                Scan     OR     Visit  [employer]  Access Code: [email or web address]    Access Code: [email or web address]  URL: [email or web address]  Date: 04/04/2024  Prepared by: Ladi Ramos    Exercises  - Single Leg Stance  - 1 x daily - 7 x weekly - 3 sets - 30 hold  - Seated Heel Toe Raises  - 1 x daily - 7 x weekly - 3 sets - 10 reps  - Seated Heel Raise  - 1 x daily - 7 x weekly - 3 sets - 10 reps  - Long Sitting Calf Stretch with Strap  - 1 x daily - 7 x weekly - 3 sets - 30 hold  - Long Sitting Plantar Fascia Stretch with Towel  - 1 x daily - 7 x weekly - 5 sets - 15 hold  - Towel Scrunches  - 1 x daily - 7 x weekly - 3 sets - 10 reps                               "

## 2024-06-04 ENCOUNTER — OFFICE VISIT (OUTPATIENT)
Dept: PHYSICAL THERAPY | Facility: REHABILITATION | Age: 29
End: 2024-06-04
Payer: OTHER MISCELLANEOUS

## 2024-06-04 DIAGNOSIS — S93.492A SPRAIN OF ANTERIOR TALOFIBULAR LIGAMENT OF LEFT ANKLE, INITIAL ENCOUNTER: Primary | ICD-10-CM

## 2024-06-04 PROCEDURE — 97140 MANUAL THERAPY 1/> REGIONS: CPT

## 2024-06-04 PROCEDURE — 97112 NEUROMUSCULAR REEDUCATION: CPT

## 2024-06-04 PROCEDURE — 97110 THERAPEUTIC EXERCISES: CPT

## 2024-06-04 NOTE — PROGRESS NOTES
"Daily Note     Today's date: 2024  Patient name: Mercedes Lynne  : 1995  MRN: [id number]  Referring provider: Jermain Patricio D*  Dx:   Encounter Diagnosis     ICD-10-CM    1. Sprain of anterior talofibular ligament of left ankle, initial encounter  S93.492A           Start Time: 0945  Stop Time: 1031  Total time in clinic (min): 46 minutes    Subjective: Pt reports she has this slight sensation of \"jiggling\" (like swelling) in her L medial ankle region with walking.       Objective: See treatment diary below      Assessment: Tolerated treatment well. Pt noted that t/o session, her sensation that she arrived with dissipated. Otherwise, did well with treatment without any exacerbations noted. Patient demonstrated fatigue post treatment, exhibited good technique with therapeutic exercises, and would benefit from continued PT      Plan: Continue per plan of care.      Precautions: No past medical history on file.    Manuals    Forefoot mobs   , + subtalar   Glides JG  [facility]   TC mobilizations  Step down [facility]   Brief STM gastroc IASTM left anterior tib JG IASTM left anterior tib  IASTM left anterior tib   Ankle PROM JG    JG JG JG  NA              Neuro Re-Ed  Edu amb         Rocker board A/P, M/L B/l x20      SLS x15  SLS  x15     Pro stretch 20\"x2 NV   Pro stretch 30\"x3 Pro stretch 30\"x3 Pro stretch 30\"x3 Pro stretch 30\"x3    Hip abd, hip ext mat 3x10    Self DF mob with TB x10 Self DF mob with TB x10 With PT x10 With PT holding purple band x10    Tandem ambulation  No sneakers 4x on foam NV  5x on foam no sneakers no UE  5x on foam no sneakers no UE  5x on foam no sneakers no UE  NV 5x on foam no sneakers no UE   SLS No foam/no sneaker 30\"x3 NV  No foam/no sneaker 30\"x3 No foam/no sneaker 30\"x3 Green pad/ no sneaker 30\"x3  NV Green pad/no sneaker 30\"x3    SLS tramp ball toss            Squats            Biodex LOS np  LOS, " "Maze control 3x ea  LOS, Maze control 3x ea  LOS, Maze control 3x ea       Leg press   44# 3x15 SL  44# 3x15 SL  44# 3x15 SL  44# 3x15 SL  NV 44# 3x15    Ther Ex           Pt education on HEP, POC           Bike for ROM 8' TM 5' 2% incline, 2.0 speef TM 8' 2%, 2.0 mph TM 8' 2%, 2.0 mph Bike 8' Bike 8' Bike 8' Bike 8'    Star slider       5 ea 5 ea   Ankle ABCs           Long sitting calf stretch   Stand 10x10\"  Stand 10x10\"  Soleus stretch 20\"x3 Soleus stretch 20\"x3     Ankle 4 way  BAPs x20 inv/ev PF/DF    X10 CCW CW  GTB 3x10         Towel curls  2' Toe yoga, toe abd, ext, flex curls 2'  Toe yoga, toe abd, ext, flex curls 2'  Toe yoga, toe abd, ext, flex curls     HR/TR seated c DB or KB  Standing TR 3x10  SL HR 3x10 TR 3x10 stand,     SL HR 3x10  TR 3x10 stand,     SL HR 3x10  TR 3x10 stand,     SL HR 3x10  TR 3x10 stand,     SL HR 3x10 TR 3x10 stand,     SL HR 3x10 TR 3x20 stand,    SL HR 3x10    Lateral stepping band    GTB NV? GTB 3 laps       Step overs  6'' for DF NV  6\" x10 ea  6\" x10 ea  Fwd step downs  6\" x15 Fwd step downs  6\" x15 4''x20 step overs 4\" x20   Step overs   Ther Activity                                 Gait Training                                 Modalities                                   Scan     OR     Visit  [employer]  Access Code: [email or web address]    Access Code: [email or web address]  URL: [email or web address]  Date: 04/04/2024  Prepared by: Ladi Ramos    Exercises  - Single Leg Stance  - 1 x daily - 7 x weekly - 3 sets - 30 hold  - Seated Heel Toe Raises  - 1 x daily - 7 x weekly - 3 sets - 10 reps  - Seated Heel Raise  - 1 x daily - 7 x weekly - 3 sets - 10 reps  - Long Sitting Calf Stretch with Strap  - 1 x daily - 7 x weekly - 3 sets - 30 hold  - Long Sitting Plantar Fascia Stretch with Towel  - 1 x daily - 7 x weekly - 5 sets - 15 hold  - Towel Scrunches  - 1 x daily - 7 x weekly - 3 sets - 10 reps                                 "

## 2024-06-05 ENCOUNTER — OFFICE VISIT (OUTPATIENT)
Dept: INTERNAL MEDICINE CLINIC | Facility: OTHER | Age: 29
End: 2024-06-05
Payer: COMMERCIAL

## 2024-06-05 VITALS
SYSTOLIC BLOOD PRESSURE: 118 MMHG | HEART RATE: 96 BPM | BODY MASS INDEX: 26.49 KG/M2 | HEIGHT: 65 IN | TEMPERATURE: 98 F | OXYGEN SATURATION: 99 % | DIASTOLIC BLOOD PRESSURE: 80 MMHG | WEIGHT: 159 LBS

## 2024-06-05 DIAGNOSIS — Z13.228 SCREENING FOR METABOLIC DISORDER: Primary | ICD-10-CM

## 2024-06-05 DIAGNOSIS — F43.23 ADJUSTMENT REACTION WITH ANXIETY AND DEPRESSION: ICD-10-CM

## 2024-06-05 DIAGNOSIS — R79.89 ABNORMAL TSH: ICD-10-CM

## 2024-06-05 DIAGNOSIS — E78.5 HYPERLIPIDEMIA, UNSPECIFIED HYPERLIPIDEMIA TYPE: ICD-10-CM

## 2024-06-05 DIAGNOSIS — E03.9 HYPOTHYROIDISM, UNSPECIFIED TYPE: ICD-10-CM

## 2024-06-05 DIAGNOSIS — S93.432D SPRAIN OF TIBIOFIBULAR LIGAMENT OF LEFT ANKLE, SUBSEQUENT ENCOUNTER: ICD-10-CM

## 2024-06-05 DIAGNOSIS — E55.9 VITAMIN D DEFICIENCY: ICD-10-CM

## 2024-06-05 DIAGNOSIS — F50.9 EATING DISORDER, UNSPECIFIED TYPE: ICD-10-CM

## 2024-06-05 PROBLEM — S93.432A SPRAIN OF TIBIOFIBULAR LIGAMENT OF LEFT ANKLE: Status: ACTIVE | Noted: 2024-06-05

## 2024-06-05 PROBLEM — Z30.09 ENCOUNTER FOR COUNSELING REGARDING CONTRACEPTION: Status: RESOLVED | Noted: 2019-03-27 | Resolved: 2024-06-05

## 2024-06-05 PROBLEM — Z30.41 ENCOUNTER FOR SURVEILLANCE OF CONTRACEPTIVE PILLS: Status: RESOLVED | Noted: 2019-03-27 | Resolved: 2024-06-05

## 2024-06-05 PROBLEM — L02.216 ABSCESS, UMBILICAL: Status: RESOLVED | Noted: 2023-04-27 | Resolved: 2024-06-05

## 2024-06-05 PROCEDURE — 99203 OFFICE O/P NEW LOW 30 MIN: CPT | Performed by: NURSE PRACTITIONER

## 2024-06-05 RX ORDER — FLUOXETINE HYDROCHLORIDE 20 MG/1
20 CAPSULE ORAL DAILY
Qty: 60 CAPSULE | Refills: 0 | Status: SHIPPED | OUTPATIENT
Start: 2024-06-05

## 2024-06-05 RX ORDER — HYDROXYZINE HYDROCHLORIDE 25 MG/1
25 TABLET, FILM COATED ORAL EVERY 6 HOURS PRN
Qty: 30 TABLET | Refills: 0 | Status: SHIPPED | OUTPATIENT
Start: 2024-06-05

## 2024-06-05 NOTE — PROGRESS NOTES
Assessment/Plan:    Eating disorder  -Encouraged to eat well-balanced diet, avoid binging and purging  -Referral to nutritionist    Adjustment reaction with anxiety and depression  -Will start Prozac and as needed Atarax  -Advised to not abruptly stop this medication  -Advised that this medication may take 4 to 6 weeks to start working  -Follow-up in 1 month or sooner if needed    Screening for metabolic disorder  -Will get fasting blood work    Sprain of tibiofibular ligament of left ankle  -Work-related injury, continue to follow with PT              Diagnoses and all orders for this visit:    Screening for metabolic disorder  -     Comprehensive metabolic panel; Future  -     CBC and differential  -     Lipid panel    Hyperlipidemia, unspecified hyperlipidemia type  -     Lipid panel    Abnormal TSH  -     TSH, 3rd generation with Free T4 reflex    Hypothyroidism, unspecified type  -     TSH, 3rd generation with Free T4 reflex    Vitamin D deficiency  -     Vitamin D 25 hydroxy    Eating disorder, unspecified type  -     Vitamin B12; Future  -     Iron Panel (Includes Ferritin, Iron Sat%, Iron, and TIBC); Future  -     Ambulatory Referral to Nutrition Services; Future    Adjustment reaction with anxiety and depression  -     Ambulatory referral to Psych Services; Future  -     FLUoxetine (PROzac) 20 mg capsule; Take 1 capsule (20 mg total) by mouth daily  -     hydrOXYzine HCL (ATARAX) 25 mg tablet; Take 1 tablet (25 mg total) by mouth every 6 (six) hours as needed for anxiety    Sprain of tibiofibular ligament of left ankle, subsequent encounter          Subjective:      Patient ID: Mercedes Lynne is a 28 y.o. female.    Patient presents today to establish care.     Depression/anxiety- she feels more anxious lately, which sometimes results in anger, reports good support system, she reports that she over thinks, no recent panic attacks, she reports that she does not sleep well due to anxiety     She reports  she is out of work due to left ankle injury, work related, follows podiatry and PT     Started in 2022 has been going through a lot of stress at that time, she reports that her weight has been stable at this point, she does report that stress has improved   She reports that her appetite is good   She reports that she eats about 1-2 meals a day, history of purging, last happened 5 years ago   She does report history of binge eating as well    Weights 3/25/20-235lbs  5/25/  6/5/    Umbilical hernia repair- Dr. Shetty        The following portions of the patient's history were reviewed and updated as appropriate: allergies, current medications, past family history, past medical history, past social history, past surgical history, and problem list.    Review of Systems   Constitutional:  Positive for unexpected weight change. Negative for activity change, appetite change, chills, diaphoresis and fever.   HENT:  Negative for congestion, ear discharge, ear pain, postnasal drip, rhinorrhea, sinus pressure, sinus pain and sore throat.    Eyes:  Negative for pain, discharge, itching and visual disturbance.   Respiratory:  Negative for cough, chest tightness, shortness of breath and wheezing.    Cardiovascular:  Negative for chest pain, palpitations and leg swelling.   Gastrointestinal:  Negative for abdominal pain, constipation, diarrhea, nausea and vomiting.   Endocrine: Negative for polydipsia, polyphagia and polyuria.   Genitourinary:  Negative for difficulty urinating, dysuria and urgency.   Musculoskeletal:  Positive for arthralgias. Negative for back pain and neck pain.   Skin:  Negative for rash and wound.   Neurological:  Negative for dizziness, weakness, numbness and headaches.   Psychiatric/Behavioral:  Positive for dysphoric mood and sleep disturbance. The patient is nervous/anxious.          Past Medical History:   Diagnosis Date    Anxiety 2010    Started around 13 y/o    Depression Approx 2010     "History of sprained ankle     history of sprained left foot         Current Outpatient Medications:     FLUoxetine (PROzac) 20 mg capsule, Take 1 capsule (20 mg total) by mouth daily, Disp: 60 capsule, Rfl: 0    hydrOXYzine HCL (ATARAX) 25 mg tablet, Take 1 tablet (25 mg total) by mouth every 6 (six) hours as needed for anxiety, Disp: 30 tablet, Rfl: 0    acetaminophen (TYLENOL) 325 mg tablet, Take 650 mg by mouth every 6 (six) hours as needed for moderate pain. Indications: Pain (Patient not taking: Reported on 5/24/2024), Disp: , Rfl:     acetaminophen (TYLENOL) 500 mg tablet, Take 1 tablet (500 mg total) by mouth every 6 (six) hours as needed for mild pain or moderate pain (Patient not taking: Reported on 5/24/2024), Disp: 30 tablet, Rfl: 0    No Known Allergies    Social History   Past Surgical History:   Procedure Laterality Date    HERNIA REPAIR      WOUND DEBRIDEMENT N/A 04/25/2023    Procedure: EXCISIONAL DEBRIDEMENT UMBILICAL HERNIA;  Surgeon: Octavia Shetty MD;  Location: [facility];  Service: General     Family History   Problem Relation Age of Onset    Hypertension Mother     Asthma Mother     Suicide Attempts Father     Asthma Sister     Heart disease Maternal Grandmother     Asthma Maternal Grandmother     Arthritis Maternal Grandmother     Diabetes Maternal Grandfather     No Known Problems Paternal Grandmother     No Known Problems Paternal Grandfather     Diabetes Family     Hypertension Family     Cancer Family     Lung cancer Other     Other Other         Mouth cancer       Objective:  /80 (BP Location: Left arm, Patient Position: Sitting, Cuff Size: Adult)   Pulse 96   Temp 98 °F (36.7 °C)   Ht 5' 5\" (1.651 m)   Wt 72.1 kg (159 lb)   SpO2 99%   BMI 26.46 kg/m²     Recent Results (from the past 1344 hour(s))   POCT urine dip    Collection Time: 05/24/24 12:16 PM   Result Value Ref Range    LEUKOCYTE ESTERASE,UA large     NITRITE,UA negative     SL AMB POCT UROBILINOGEN 0.2     POCT " URINE PROTEIN 100      PH,UA 5.0     BLOOD,UA large     SPECIFIC GRAVITY,UA 1.015     KETONES,UA negative     BILIRUBIN,UA negative     GLUCOSE, UA negative      COLOR,UA yellow     CLARITY,UA cloudy    Urine culture    Collection Time: 05/24/24 12:19 PM    Specimen: Urine, Clean Catch   Result Value Ref Range    Urine Culture >100,000 cfu/ml Proteus mirabilis (A)     Urine Culture 20,000-29,000 cfu/ml        Susceptibility    Proteus mirabilis - MERLE     ZID Performed Yes       Ampicillin ($$) <=8.00 Susceptible ug/ml     Aztreonam ($$$)  <=4 Susceptible ug/ml     Cefazolin ($) <=2.00 Susceptible ug/ml     Ciprofloxacin ($)  <=0.25 Susceptible ug/ml     Gentamicin ($$) <=2 Susceptible ug/ml     Levofloxacin ($) <=0.50 Susceptible ug/ml     Nitrofurantoin >64 Resistant ug/ml     Tetracycline >8 Resistant ug/ml     Tobramycin ($) <=2 Susceptible ug/ml     Trimethoprim + Sulfamethoxazole ($$$) <=0.5/9.5 Susceptible ug/ml            Physical Exam  Constitutional:       General: She is not in acute distress.     Appearance: She is well-developed. She is not diaphoretic.   HENT:      Head: Normocephalic and atraumatic.      Right Ear: External ear normal.      Left Ear: External ear normal.      Nose: Nose normal.      Mouth/Throat:      Mouth: Mucous membranes are moist.      Pharynx: No oropharyngeal exudate or posterior oropharyngeal erythema.   Eyes:      General:         Right eye: No discharge.         Left eye: No discharge.      Conjunctiva/sclera: Conjunctivae normal.      Pupils: Pupils are equal, round, and reactive to light.   Neck:      Thyroid: No thyromegaly.   Cardiovascular:      Rate and Rhythm: Normal rate and regular rhythm.      Heart sounds: Normal heart sounds. No murmur heard.     No friction rub. No gallop.   Pulmonary:      Effort: Pulmonary effort is normal. No respiratory distress.      Breath sounds: Normal breath sounds. No stridor. No wheezing or rales.   Abdominal:      General: Bowel  sounds are normal. There is no distension.      Palpations: Abdomen is soft.      Tenderness: There is no abdominal tenderness.   Musculoskeletal:      Cervical back: Normal range of motion and neck supple.   Lymphadenopathy:      Cervical: No cervical adenopathy.   Skin:     General: Skin is warm and dry.      Findings: No erythema or rash.   Neurological:      Mental Status: She is alert and oriented to person, place, and time.   Psychiatric:         Behavior: Behavior normal.         Thought Content: Thought content normal.         Judgment: Judgment normal.

## 2024-06-05 NOTE — ASSESSMENT & PLAN NOTE
-Will start Prozac and as needed Atarax  -Advised to not abruptly stop this medication  -Advised that this medication may take 4 to 6 weeks to start working  -Follow-up in 1 month or sooner if needed

## 2024-06-06 ENCOUNTER — OFFICE VISIT (OUTPATIENT)
Dept: PHYSICAL THERAPY | Facility: REHABILITATION | Age: 29
End: 2024-06-06
Payer: OTHER MISCELLANEOUS

## 2024-06-06 DIAGNOSIS — Z00.6 ENCOUNTER FOR EXAMINATION FOR NORMAL COMPARISON OR CONTROL IN CLINICAL RESEARCH PROGRAM: ICD-10-CM

## 2024-06-06 DIAGNOSIS — S93.492A SPRAIN OF ANTERIOR TALOFIBULAR LIGAMENT OF LEFT ANKLE, INITIAL ENCOUNTER: Primary | ICD-10-CM

## 2024-06-06 PROCEDURE — 97140 MANUAL THERAPY 1/> REGIONS: CPT

## 2024-06-06 PROCEDURE — 97110 THERAPEUTIC EXERCISES: CPT

## 2024-06-06 PROCEDURE — 97112 NEUROMUSCULAR REEDUCATION: CPT

## 2024-06-06 NOTE — PROGRESS NOTES
Daily Note     Today's date: 2024  Patient name: Mercedes Lynne  : 1995  MRN: [id number]  Referring provider: Jermain Patricio D*  Dx:   Encounter Diagnosis     ICD-10-CM    1. Sprain of anterior talofibular ligament of left ankle, initial encounter  S93.492A             Start Time: 0940  Stop Time: 1030  Total time in clinic (min): 50 minutes      Subjective: Patient reports very mild medial ankle pain, but that it continues to be improving with each PT session she attends.      Objective: See treatment diary below      Assessment: Tolerated treatment well. Patient did not experience any onset of ankle/foot symptoms throughout today's session. Patient displayed proper form and had a good recall/understanding of all exercises in her program. Progressed patient by increasing reps during step overs which was more fatiguing and difficult, but did not cause any worsening of symptoms throughout exercise. Mild episodes of instability noted during tandem walking on foam beam, but patient was able to self correct her body mechanics/foot placement to avoid any episodes of LOB from occurring. Patient continues to respond favorably to IASTM treatment for L ant tib muscle and noted relief and improvements in initially noted ankle tightness post treatment. Muscle fatigue present at the conclusion of session. Patient demonstrated fatigue post treatment, exhibited good technique with therapeutic exercises, and would benefit from continued PT      Plan: Continue per plan of care.      Precautions: No past medical history on file.    Manuals  6 6   Forefoot mobs   , + subtalar   Glides [facility]  [facility]    TC mobilizations  Step down [facility]   Brief STM gastroc IASTM left anterior tib JG IASTM left anterior tib  IASTM left anterior tib IASTM left anterior tib   Ankle PROM JG    [facility]               Neuro Re-Ed  Edu amb          Rocker board  "A/P, M/L B/l x20      SLS x15  SLS  x15 SLS x15     Pro stretch 20\"x2 NV   Pro stretch 30\"x3 Pro stretch 30\"x3 Pro stretch 30\"x3 Pro stretch 30\"x3  Pro stretch 30\"x3   Hip abd, hip ext mat 3x10    Self DF mob with TB x10 Self DF mob with TB x10 With PT x10 With PT holding purple band x10  With PTA holding purple band x10   Tandem ambulation  No sneakers 4x on foam NV  5x on foam no sneakers no UE  5x on foam no sneakers no UE  5x on foam no sneakers no UE  NV 5x on foam no sneakers no UE 5x on foam no sneakers no UE   SLS No foam/no sneaker 30\"x3 NV  No foam/no sneaker 30\"x3 No foam/no sneaker 30\"x3 Green pad/ no sneaker 30\"x3  NV Green pad/no sneaker 30\"x3  Green pad/no sneaker 30\"x3   SLS tramp ball toss             Squats             Biodex LOS np  LOS, Maze control 3x ea  LOS, Maze control 3x ea  LOS, Maze control 3x ea        Leg press   44# 3x15 SL  44# 3x15 SL  44# 3x15 SL  44# 3x15 SL  NV 44# 3x15  44# 3x15   Ther Ex            Pt education on HEP, POC            Bike for ROM 8' TM 5' 2% incline, 2.0 speef TM 8' 2%, 2.0 mph TM 8' 2%, 2.0 mph Bike 8' Bike 8' Bike 8' Bike 8'  Bike 8'   Star slider       5 ea 5 ea 5 ea   Ankle ABCs            Long sitting calf stretch   Stand 10x10\"  Stand 10x10\"  Soleus stretch 20\"x3 Soleus stretch 20\"x3      Ankle 4 way  BAPs x20 inv/ev PF/DF    X10 CCW CW  GTB 3x10          Towel curls  2' Toe yoga, toe abd, ext, flex curls 2'  Toe yoga, toe abd, ext, flex curls 2'  Toe yoga, toe abd, ext, flex curls      HR/TR seated c DB or KB  Standing TR 3x10  SL HR 3x10 TR 3x10 stand,     SL HR 3x10  TR 3x10 stand,     SL HR 3x10  TR 3x10 stand,     SL HR 3x10  TR 3x10 stand,     SL HR 3x10 TR 3x10 stand,     SL HR 3x10 TR 3x20 stand,    SL HR 3x10  TR 3x20 stand,    SL HR 3x10   Lateral stepping band    GTB NV? GTB 3 laps        Step overs  6'' for DF NV  6\" x10 ea  6\" x10 ea  Fwd step downs  6\" x15 Fwd step downs  6\" x15 4''x20 step overs 4\" x20   Step overs 4\"x25 step overs   Ther " Activity                                    Gait Training                                    Modalities                                      Scan     OR     Visit  [employer]  Access Code: [email or web address]    Access Code: [email or web address]  URL: [email or web address]  Date: 04/04/2024  Prepared by: Ladi Ramos    Exercises  - Single Leg Stance  - 1 x daily - 7 x weekly - 3 sets - 30 hold  - Seated Heel Toe Raises  - 1 x daily - 7 x weekly - 3 sets - 10 reps  - Seated Heel Raise  - 1 x daily - 7 x weekly - 3 sets - 10 reps  - Long Sitting Calf Stretch with Strap  - 1 x daily - 7 x weekly - 3 sets - 30 hold  - Long Sitting Plantar Fascia Stretch with Towel  - 1 x daily - 7 x weekly - 5 sets - 15 hold  - Towel Scrunches  - 1 x daily - 7 x weekly - 3 sets - 10 reps

## 2024-06-11 ENCOUNTER — OFFICE VISIT (OUTPATIENT)
Dept: PHYSICAL THERAPY | Facility: REHABILITATION | Age: 29
End: 2024-06-11
Payer: OTHER MISCELLANEOUS

## 2024-06-11 DIAGNOSIS — S93.492A SPRAIN OF ANTERIOR TALOFIBULAR LIGAMENT OF LEFT ANKLE, INITIAL ENCOUNTER: Primary | ICD-10-CM

## 2024-06-11 PROCEDURE — 97112 NEUROMUSCULAR REEDUCATION: CPT | Performed by: PHYSICAL THERAPIST

## 2024-06-11 PROCEDURE — 97140 MANUAL THERAPY 1/> REGIONS: CPT | Performed by: PHYSICAL THERAPIST

## 2024-06-11 NOTE — PROGRESS NOTES
"Daily Note     Today's date: 2024  Patient name: Mercedes Lynne  : 1995  MRN: [id number]  Referring provider: Jermain Patricio D*  Dx:   Encounter Diagnosis     ICD-10-CM    1. Sprain of anterior talofibular ligament of left ankle, initial encounter  S93.492A           Start Time: 0945  Stop Time: 1030  Total time in clinic (min): 45 minutes    Subjective: Pt reports continued tightness of L anterior tibialis however notes improvement in symptoms overall.       Objective: See treatment diary below      Assessment: Pt tolerated treatment well this visit with emphasis on improving L anterior tibialis strength. Pt demonstrates significant weakness and decreased endurance of L ant. Tib resulting in continued gait deviations and compensations with prolonged ambulation. Increased fatigue noted and demonstrated with manual interventions as documented below. Pt denied increase pain or discomfort throughout progression of exercises however did note increase in sensation of posterior tibialis that decreased with termination of interventions. Pt would continue to benefit from skilled PT to improve strength of L LE and optimize overall function.       Plan: Continue per plan of care.      Precautions: No past medical history on file.    Manuals    Forefoot mobs   MH, + subtalar   Glides JG  Glides J Glides  NA + subtalar    TC mobilizations  Step down [facility]   Brief STM gastroc IASTM left anterior tib JG IASTM left anterior tib  IASTM left anterior tib IASTM left anterior tib   Ankle PROM     JG JG JG  NA KR   Ankle PNF AYDEN  DF+eversion    PF+inversion           Resisted ankle DF Ecc  AYDEN           Neuro Re-Ed  Edu amb          Rocker board A/P, M/L       SLS x15  SLS  x15 SLS x15      NV   Pro stretch 30\"x3 Pro stretch 30\"x3 Pro stretch 30\"x3 Pro stretch 30\"x3  Pro stretch 30\"x3   Hip abd, hip ext mat SL airplane with Purple band (under L GT)  2x10    Self DF " "mob with TB x10 Self DF mob with TB x10 With PT x10 With PT holding purple band x10  With PTA holding purple band x10   Tandem ambulation   NV  5x on foam no sneakers no UE  5x on foam no sneakers no UE  5x on foam no sneakers no UE  NV 5x on foam no sneakers no UE 5x on foam no sneakers no UE   SLS 3-way kicks SLS  3x10ea NV  No foam/no sneaker 30\"x3 No foam/no sneaker 30\"x3 Green pad/ no sneaker 30\"x3  NV Green pad/no sneaker 30\"x3  Green pad/no sneaker 30\"x3   SLS tramp ball toss             Squats             Biodex LOS   LOS, Maze control 3x ea  LOS, Maze control 3x ea  LOS, Maze control 3x ea        Leg press   44# 3x15 SL  44# 3x15 SL  44# 3x15 SL  44# 3x15 SL  NV 44# 3x15  44# 3x15   Ther Ex            Pt education on HEP, POC            Bike for ROM Bike 8' TM 5' 2% incline, 2.0 speef TM 8' 2%, 2.0 mph TM 8' 2%, 2.0 mph Bike 8' Bike 8' Bike 8' Bike 8'  Bike 8'   Star slider       5 ea 5 ea 5 ea   Ankle ABCs            Long sitting calf stretch   Stand 10x10\"  Stand 10x10\"  Soleus stretch 20\"x3 Soleus stretch 20\"x3      Ankle 4 way    GTB 3x10          Towel curls   Toe yoga, toe abd, ext, flex curls 2'  Toe yoga, toe abd, ext, flex curls 2'  Toe yoga, toe abd, ext, flex curls      HR/TR seated c DB or KB Ecc SL HR 1R 2x10    Ecc TR 1R 1x10 Standing TR 3x10  SL HR 3x10 TR 3x10 stand,     SL HR 3x10  TR 3x10 stand,     SL HR 3x10  TR 3x10 stand,     SL HR 3x10  TR 3x10 stand,     SL HR 3x10 TR 3x10 stand,     SL HR 3x10 TR 3x20 stand,    SL HR 3x10  TR 3x20 stand,    SL HR 3x10   Lateral stepping band    GTB NV? GTB 3 laps        Step overs  6'' for DF NV  6\" x10 ea  6\" x10 ea  Fwd step downs  6\" x15 Fwd step downs  6\" x15 4''x20 step overs 4\" x20   Step overs 4\"x25 step overs   Ther Activity                                    Gait Training                                    Modalities                                      Scan     OR     Visit  [email or web address]  Access Code: [email or web address]    Access Code: " [email or web address]  URL: [email or web address]  Date: 04/04/2024  Prepared by: Ladi Ramos    Exercises  - Single Leg Stance  - 1 x daily - 7 x weekly - 3 sets - 30 hold  - Seated Heel Toe Raises  - 1 x daily - 7 x weekly - 3 sets - 10 reps  - Seated Heel Raise  - 1 x daily - 7 x weekly - 3 sets - 10 reps  - Long Sitting Calf Stretch with Strap  - 1 x daily - 7 x weekly - 3 sets - 30 hold  - Long Sitting Plantar Fascia Stretch with Towel  - 1 x daily - 7 x weekly - 5 sets - 15 hold  - Towel Scrunches  - 1 x daily - 7 x weekly - 3 sets - 10 reps

## 2024-06-13 ENCOUNTER — OFFICE VISIT (OUTPATIENT)
Dept: PHYSICAL THERAPY | Facility: REHABILITATION | Age: 29
End: 2024-06-13
Payer: OTHER MISCELLANEOUS

## 2024-06-13 DIAGNOSIS — S93.492A SPRAIN OF ANTERIOR TALOFIBULAR LIGAMENT OF LEFT ANKLE, INITIAL ENCOUNTER: Primary | ICD-10-CM

## 2024-06-13 PROCEDURE — 97140 MANUAL THERAPY 1/> REGIONS: CPT | Performed by: PHYSICAL THERAPIST

## 2024-06-13 PROCEDURE — 97112 NEUROMUSCULAR REEDUCATION: CPT | Performed by: PHYSICAL THERAPIST

## 2024-06-13 PROCEDURE — 97110 THERAPEUTIC EXERCISES: CPT | Performed by: PHYSICAL THERAPIST

## 2024-06-13 NOTE — PROGRESS NOTES
"Daily Note     Today's date: 2024  Patient name: Mercedes Lynne  : 1995  MRN: [id number]  Referring provider: Jermain Patricio D*  Dx:   Encounter Diagnosis     ICD-10-CM    1. Sprain of anterior talofibular ligament of left ankle, initial encounter  S93.492A           Start Time: 0945  Stop Time: 1030  Total time in clinic (min): 45 minutes    Subjective: Pt reports increased muscular soreness following last session but denies pain.       Objective: See treatment diary below      Assessment: Pt tolerated treatment well this visit and ability to tolerate progression of exercises as documented below. Pt continues to demonstrate weakness and decreased endurance of L anterior tibialis resulting in increased compensation of peroneal musculature to achieve increased DF requiring VVT cues to improve technique. Pt noted increased fatigue post-session. Pt would continue to benefit from skilled PT in order to restore deficits and attain set goals.       Plan: Continue per plan of care.      Precautions: No past medical history on file.    Manuals    Forefoot mobs     Glides JG  Glides JG Glides FH NA + subtalar    TC mobilizations     Brief STM gastroc IASTM left anterior tib JG IASTM left anterior tib  IASTM left anterior tib IASTM left anterior tib   Ankle PROM    JG JG JG FH NA KR   Ankle PNF AYDEN  DF+eversion    PF+inversion AYDEN  DF+eversion    PF+inversion          Resisted ankle DF Ecc  AYDEN Ecc AYDEN          Neuro Re-Ed            Rocker board A/P, M/L       SLS x15  SLS  x15 SLS x15         Pro stretch 30\"x3 Pro stretch 30\"x3 Pro stretch 30\"x3 Pro stretch 30\"x3  Pro stretch 30\"x3   Hip abd, hip ext mat SL airplane with Purple band (under L GT)  2x10 SL airplane with Purple band (under L GT)  2x10   Self DF mob with TB x10 Self DF mob with TB x10 With PT x10 With PT holding purple band x10  With PTA holding purple band x10   Tandem ambulation     5x on foam no " "sneakers no UE  5x on foam no sneakers no UE  5x on foam no sneakers no UE  NV 5x on foam no sneakers no UE 5x on foam no sneakers no UE   SLS 3-way kicks SLS  3x10ea 3-way kicks SLS  3x10ea  No foam/no sneaker 30\"x3 No foam/no sneaker 30\"x3 Green pad/ no sneaker 30\"x3  NV Green pad/no sneaker 30\"x3  Green pad/no sneaker 30\"x3   SLS tramp ball toss             Squats             Biodex LOS    LOS, Maze control 3x ea  LOS, Maze control 3x ea        Leg press  44# 3x15  44# 3x15 SL  44# 3x15 SL  44# 3x15 SL  NV 44# 3x15  44# 3x15   Ther Ex            Pt education on HEP, POC            Bike for ROM Bike 8' Bike 8'  TM 8' 2%, 2.0 mph Bike 8' Bike 8' Bike 8' Bike 8'  Bike 8'   Star slider       5 ea 5 ea 5 ea   Ankle ABCs            Long sitting calf stretch    Stand 10x10\"  Soleus stretch 20\"x3 Soleus stretch 20\"x3      Ankle 4 way             Towel curls     Toe yoga, toe abd, ext, flex curls 2'  Toe yoga, toe abd, ext, flex curls      HR/TR seated c DB or KB Ecc SL HR 1R 2x10    Ecc TR 1R 1x10 Ecc SL HR 1R 2x10    Ecc TR 1R 1x10  TR 3x10 stand,     SL HR 3x10  TR 3x10 stand,     SL HR 3x10  TR 3x10 stand,     SL HR 3x10 TR 3x10 stand,     SL HR 3x10 TR 3x20 stand,    SL HR 3x10  TR 3x20 stand,    SL HR 3x10   Lateral stepping band   GTB 3 laps  GTB 3 laps        Step overs    6\" x10 ea  Fwd step downs  6\" x15 Fwd step downs  6\" x15 4''x20 step overs 4\" x20   Step overs 4\"x25 step overs   Ther Activity            Static lunge  W/ HR x10                      Gait Training                                    Modalities                                      Scan     OR     Visit  [employer]  Access Code: [email or web address]    Access Code: [email or web address]  URL: [email or web address]  Date: 04/04/2024  Prepared by: Ladi Ramos    Exercises  - Single Leg Stance  - 1 x daily - 7 x weekly - 3 sets - 30 hold  - Seated Heel Toe Raises  - 1 x daily - 7 x weekly - 3 sets - 10 reps  - Seated Heel Raise  - 1 x daily - " 7 x weekly - 3 sets - 10 reps  - Long Sitting Calf Stretch with Strap  - 1 x daily - 7 x weekly - 3 sets - 30 hold  - Long Sitting Plantar Fascia Stretch with Towel  - 1 x daily - 7 x weekly - 5 sets - 15 hold  - Towel Scrunches  - 1 x daily - 7 x weekly - 3 sets - 10 reps

## 2024-06-17 ENCOUNTER — OFFICE VISIT (OUTPATIENT)
Dept: PODIATRY | Facility: CLINIC | Age: 29
End: 2024-06-17
Payer: OTHER MISCELLANEOUS

## 2024-06-17 VITALS — SYSTOLIC BLOOD PRESSURE: 98 MMHG | DIASTOLIC BLOOD PRESSURE: 64 MMHG | HEART RATE: 60 BPM

## 2024-06-17 DIAGNOSIS — S93.492A SPRAIN OF ANTERIOR TALOFIBULAR LIGAMENT OF LEFT ANKLE, INITIAL ENCOUNTER: Primary | ICD-10-CM

## 2024-06-17 PROCEDURE — 99213 OFFICE O/P EST LOW 20 MIN: CPT | Performed by: PODIATRIST

## 2024-06-17 NOTE — LETTER
June 17, 2024     Patient: Mercedes Lynne  YOB: 1995  Date of Visit: 6/17/2024      To Whom it May Concern:    Mercedes Lynne is under my professional care. Mercedes was seen in my office on 6/17/2024. Mercedes may return to work on 6/18/2024 without limitations .    If you have any questions or concerns, please don't hesitate to call.         Sincerely,          Jermain Patricio DPM        CC: No Recipients

## 2024-06-17 NOTE — PROGRESS NOTES
Assessment/Plan:      Diagnoses and all orders for this visit:    Sprain of anterior talofibular ligament of left ankle, initial encounter      PAtient is feeling much better. She may return to work without limitations tomorrow. Reappoint as needed.     Subjective:     Patient ID: Mercedes Lynne is a 28 y.o. female.    Patient is f/u left ankle inversion injury. She has been out of work and in PT. She is seeing some progress, still sore but definitely progress.         Review of Systems    As stated in HPI, otherwise normal    Medical History Reviewed by provider this encounter:  Tobacco  Allergies  Meds  Problems  Med Hx  Surg Hx  Fam Hx        Objective:     Physical Exam  Vitals reviewed.   Constitutional:       Appearance: She is not ill-appearing or diaphoretic.   Cardiovascular:      Rate and Rhythm: Normal rate.      Pulses: Normal pulses.   Pulmonary:      Effort: Pulmonary effort is normal. No respiratory distress.   Musculoskeletal:         General: No tenderness (no ankle pain or instability).   Skin:     Capillary Refill: Capillary refill takes less than 2 seconds.      Findings: No erythema or rash.   Neurological:      Mental Status: She is alert and oriented to person, place, and time.      Sensory: No sensory deficit.      Gait: Gait normal.   Psychiatric:         Mood and Affect: Mood normal.

## 2024-06-18 ENCOUNTER — OFFICE VISIT (OUTPATIENT)
Dept: PHYSICAL THERAPY | Facility: REHABILITATION | Age: 29
End: 2024-06-18
Payer: OTHER MISCELLANEOUS

## 2024-06-18 ENCOUNTER — APPOINTMENT (OUTPATIENT)
Dept: LAB | Facility: IMAGING CENTER | Age: 29
End: 2024-06-18
Payer: COMMERCIAL

## 2024-06-18 DIAGNOSIS — Z13.228 SCREENING FOR METABOLIC DISORDER: ICD-10-CM

## 2024-06-18 DIAGNOSIS — S93.492A SPRAIN OF ANTERIOR TALOFIBULAR LIGAMENT OF LEFT ANKLE, INITIAL ENCOUNTER: Primary | ICD-10-CM

## 2024-06-18 DIAGNOSIS — Z00.6 ENCOUNTER FOR EXAMINATION FOR NORMAL COMPARISON OR CONTROL IN CLINICAL RESEARCH PROGRAM: ICD-10-CM

## 2024-06-18 DIAGNOSIS — F50.9 EATING DISORDER, UNSPECIFIED TYPE: ICD-10-CM

## 2024-06-18 LAB
25(OH)D3 SERPL-MCNC: 15.5 NG/ML (ref 30–100)
ALBUMIN SERPL BCP-MCNC: 4.4 G/DL (ref 3.5–5)
ALP SERPL-CCNC: 56 U/L (ref 34–104)
ALT SERPL W P-5'-P-CCNC: 9 U/L (ref 7–52)
ANION GAP SERPL CALCULATED.3IONS-SCNC: 10 MMOL/L (ref 4–13)
AST SERPL W P-5'-P-CCNC: 15 U/L (ref 13–39)
BASOPHILS # BLD AUTO: 0.05 THOUSANDS/ÂΜL (ref 0–0.1)
BASOPHILS NFR BLD AUTO: 1 % (ref 0–1)
BILIRUB SERPL-MCNC: 0.43 MG/DL (ref 0.2–1)
BUN SERPL-MCNC: 14 MG/DL (ref 5–25)
CALCIUM SERPL-MCNC: 9 MG/DL (ref 8.4–10.2)
CHLORIDE SERPL-SCNC: 103 MMOL/L (ref 96–108)
CHOLEST SERPL-MCNC: 144 MG/DL
CO2 SERPL-SCNC: 26 MMOL/L (ref 21–32)
CREAT SERPL-MCNC: 0.88 MG/DL (ref 0.6–1.3)
EOSINOPHIL # BLD AUTO: 0.19 THOUSAND/ÂΜL (ref 0–0.61)
EOSINOPHIL NFR BLD AUTO: 3 % (ref 0–6)
ERYTHROCYTE [DISTWIDTH] IN BLOOD BY AUTOMATED COUNT: 12.5 % (ref 11.6–15.1)
FERRITIN SERPL-MCNC: 94 NG/ML (ref 11–307)
GFR SERPL CREATININE-BSD FRML MDRD: 90 ML/MIN/1.73SQ M
GLUCOSE P FAST SERPL-MCNC: 82 MG/DL (ref 65–99)
HCT VFR BLD AUTO: 41.4 % (ref 34.8–46.1)
HDLC SERPL-MCNC: 45 MG/DL
HGB BLD-MCNC: 13.4 G/DL (ref 11.5–15.4)
IMM GRANULOCYTES # BLD AUTO: 0.01 THOUSAND/UL (ref 0–0.2)
IMM GRANULOCYTES NFR BLD AUTO: 0 % (ref 0–2)
IRON SATN MFR SERPL: 32 % (ref 15–50)
IRON SERPL-MCNC: 84 UG/DL (ref 50–212)
LDLC SERPL CALC-MCNC: 87 MG/DL (ref 0–100)
LYMPHOCYTES # BLD AUTO: 1.89 THOUSANDS/ÂΜL (ref 0.6–4.47)
LYMPHOCYTES NFR BLD AUTO: 27 % (ref 14–44)
MCH RBC QN AUTO: 29.1 PG (ref 26.8–34.3)
MCHC RBC AUTO-ENTMCNC: 32.4 G/DL (ref 31.4–37.4)
MCV RBC AUTO: 90 FL (ref 82–98)
MONOCYTES # BLD AUTO: 0.34 THOUSAND/ÂΜL (ref 0.17–1.22)
MONOCYTES NFR BLD AUTO: 5 % (ref 4–12)
NEUTROPHILS # BLD AUTO: 4.43 THOUSANDS/ÂΜL (ref 1.85–7.62)
NEUTS SEG NFR BLD AUTO: 64 % (ref 43–75)
NONHDLC SERPL-MCNC: 99 MG/DL
NRBC BLD AUTO-RTO: 0 /100 WBCS
PLATELET # BLD AUTO: 191 THOUSANDS/UL (ref 149–390)
PMV BLD AUTO: 12 FL (ref 8.9–12.7)
POTASSIUM SERPL-SCNC: 4.2 MMOL/L (ref 3.5–5.3)
PROT SERPL-MCNC: 6.9 G/DL (ref 6.4–8.4)
RBC # BLD AUTO: 4.6 MILLION/UL (ref 3.81–5.12)
SODIUM SERPL-SCNC: 139 MMOL/L (ref 135–147)
TIBC SERPL-MCNC: 264 UG/DL (ref 250–450)
TRIGL SERPL-MCNC: 60 MG/DL
TSH SERPL DL<=0.05 MIU/L-ACNC: 2.53 UIU/ML (ref 0.45–4.5)
UIBC SERPL-MCNC: 180 UG/DL (ref 155–355)
VIT B12 SERPL-MCNC: 318 PG/ML (ref 180–914)
WBC # BLD AUTO: 6.91 THOUSAND/UL (ref 4.31–10.16)

## 2024-06-18 PROCEDURE — 82607 VITAMIN B-12: CPT

## 2024-06-18 PROCEDURE — 82728 ASSAY OF FERRITIN: CPT

## 2024-06-18 PROCEDURE — 97112 NEUROMUSCULAR REEDUCATION: CPT | Performed by: PHYSICAL THERAPIST

## 2024-06-18 PROCEDURE — 80053 COMPREHEN METABOLIC PANEL: CPT

## 2024-06-18 PROCEDURE — 83550 IRON BINDING TEST: CPT

## 2024-06-18 PROCEDURE — 97110 THERAPEUTIC EXERCISES: CPT | Performed by: PHYSICAL THERAPIST

## 2024-06-18 PROCEDURE — 83540 ASSAY OF IRON: CPT

## 2024-06-18 NOTE — PROGRESS NOTES
Daily Note     Today's date: 2024  Patient name: Mercedes Lynne  : 1995  MRN: [id number]  Referring provider: Jermain Patricio D*  Dx:   Encounter Diagnosis     ICD-10-CM    1. Sprain of anterior talofibular ligament of left ankle, initial encounter  S93.492A           Start Time: 0950  Stop Time: 1030  Total time in clinic (min): 40 minutes    Subjective: Pt reports that she was able to ambulate for 40 minutes this morning without pain however does report that she feels that her foot continues to flop during midstance. She had a follow-up with Dr. Patricio and was discharged from his care and was encouraged to return to work tonight. Pt reports that she is working tonight and her shifts consist of 5-6 hours of standing and walking which she is concerned about returning without any restrictions and re-injury.       Objective: See treatment diary below      Assessment: Pt tolerated treatment well this visit. Pt demonstrates overall good ankle strength and mobility however lacks endurance of L anterior tibialis leading to poor eccentric control of DF during ambulation. Pt at this time also demonstrates increased pronation in midstance and poor ability to re-supinate during terminal stance however denies pain with activity or current therapeutic exercise program as documented below. Increased muscular fatigue reported post-session. Pt will RTW this date and educated on continuing current HEP to maintain strength and ROM to optimize LE function and reduce risk of re-injury.       Plan: Continue per plan of care.      Precautions: No past medical history on file.    Manuals    Forefoot mobs      Glides JG Glides FH NA + subtalar    TC mobilizations      IASTM left anterior tib JG IASTM left anterior tib  IASTM left anterior tib IASTM left anterior tib   Ankle PROM      JG FH NA KR   Ankle PNF AYDEN  DF+eversion    PF+inversion AYDEN  DF+eversion    PF+inversion  "AYDEN  DF+eversion    PF+inversion         Resisted ankle DF Ecc  AYDEN Ecc AYDEN Ecc AYDEN         Neuro Re-Ed            Rocker board A/P, M/L       SLS x15  SLS  x15 SLS x15          Pro stretch 30\"x3 Pro stretch 30\"x3 Pro stretch 30\"x3  Pro stretch 30\"x3   Hip abd, hip ext mat SL airplane with Purple band (under L GT)  2x10 SL airplane with Purple band (under L GT)  2x10 SL airplane with Purple band (under L GT)  2x10   Self DF mob with TB x10 With PT x10 With PT holding purple band x10  With PTA holding purple band x10   Tandem ambulation       5x on foam no sneakers no UE  NV 5x on foam no sneakers no UE 5x on foam no sneakers no UE   SLS 3-way kicks SLS  3x10ea 3-way kicks SLS  3x10ea 3-way kicks SLS  3x10ea   Green pad/ no sneaker 30\"x3  NV Green pad/no sneaker 30\"x3  Green pad/no sneaker 30\"x3   SLS tramp ball toss             Squats             Biodex LOS            Leg press  44# 3x15    44# 3x15 SL  NV 44# 3x15  44# 3x15   Ther Ex            Pt education on HEP, POC            Bike for ROM Bike 8' Bike 8' TM 8'   Bike 8' Bike 8' Bike 8'  Bike 8'   Star slider       5 ea 5 ea 5 ea   Ankle ABCs            Long sitting calf stretch      Soleus stretch 20\"x3      Ankle 4 way             Towel curls       Toe yoga, toe abd, ext, flex curls      HR/TR seated c DB or KB Ecc SL HR 1R 2x10    Ecc TR 1R 1x10 Ecc SL HR 1R 2x10    Ecc TR 1R 1x10 Ecc SL HR 1R 2x10    Ecc TR 1R 3x10 TR 3x10 stand,     SL HR 3x10  TR 3x10 stand,     SL HR 3x10  TR 3x10 stand,     SL HR 3x10 TR 3x10 stand,     SL HR 3x10 TR 3x20 stand,    SL HR 3x10  TR 3x20 stand,    SL HR 3x10   Lateral stepping band   GTB 3 laps GTB 3 laps GTB 3 laps        Step overs    6\" x10 ea  Fwd step downs  6\" x15 Fwd step downs  6\" x15 4''x20 step overs 4\" x20   Step overs 4\"x25 step overs   Ther Activity            Static lunge  W/ HR x10 W/ HR x10                     Gait Training                                    Modalities                                      Scan   "   OR     Visit  [email or web address]  Access Code: [email or web address]    Access Code: [email or web address]  URL: [email or web address]  Date: 04/04/2024  Prepared by: Ladi Ramos    Exercises  - Single Leg Stance  - 1 x daily - 7 x weekly - 3 sets - 30 hold  - Seated Heel Toe Raises  - 1 x daily - 7 x weekly - 3 sets - 10 reps  - Seated Heel Raise  - 1 x daily - 7 x weekly - 3 sets - 10 reps  - Long Sitting Calf Stretch with Strap  - 1 x daily - 7 x weekly - 3 sets - 30 hold  - Long Sitting Plantar Fascia Stretch with Towel  - 1 x daily - 7 x weekly - 5 sets - 15 hold  - Towel Scrunches  - 1 x daily - 7 x weekly - 3 sets - 10 reps

## 2024-06-20 ENCOUNTER — APPOINTMENT (OUTPATIENT)
Dept: PHYSICAL THERAPY | Facility: REHABILITATION | Age: 29
End: 2024-06-20
Payer: OTHER MISCELLANEOUS

## 2024-06-20 NOTE — PROGRESS NOTES
"Subjective      Mercedes Lynne is a 28 y.o. female who presents for annual well woman exam.     GYN:  Reports frequent yeast infections after taking antibiotics, but no symptoms currently  Menses are intermittent and extremely light with IUD  Contraception: She had a Mirena IUD inserted on 2019..  Patient is currenly sexually active with her male partner.  Gardasil: completed    OB:   female    :  Denies current dysuria, urinary frequency or urgency.  Feels like she gets frequent UTI's, although no symptoms currently, and then yeast infections afterwards  Denies hematuria, flank pain, incontinence.  Denies constipation, diarrhea    Breast:  Denies breast mass, skin changes, dimpling, reddening, nipple retraction.  Denies breast discharge.  Patient thinks her maternal great aunt may have had breast cancer but isn't sure     General:  Diet: Reviewed dietary calcium recommendations  Exercise: Reviewed recommendation of 150 minutes of moderate intensity exercise per week  ETOH use: rarely  Tobacco use: former vaping  Recreational drug use: marijuana    Screening:  Cervical cancer: no prior pap smears  STD screening: GCCT collected today, HIV, syphilis and hepatitis B/C ordered.    Review of Systems  Pertinent items are noted in HPI.      Objective      /82 (BP Location: Right arm, Patient Position: Sitting, Cuff Size: Standard)   Pulse 72   Ht 5' 5\" (1.651 m)   Wt 72.7 kg (160 lb 3.2 oz)   LMP  (LMP Unknown)   SpO2 98%   BMI 26.66 kg/m²     Physical Exam  Exam conducted with a chaperone present.   Constitutional:       Appearance: Normal appearance.   HENT:      Head: Normocephalic and atraumatic.   Cardiovascular:      Rate and Rhythm: Normal rate.   Pulmonary:      Effort: Pulmonary effort is normal. No respiratory distress.   Chest:   Breasts:     Right: Normal. No inverted nipple, mass or tenderness.      Left: Normal. No inverted nipple, mass or tenderness.   Abdominal:      Palpations: " Abdomen is soft.      Tenderness: There is no abdominal tenderness.   Genitourinary:     General: Normal vulva.      Vagina: No vaginal discharge.      Comments: Cervix appears normal with IUD strings protruding 3 cm beyond os; normal appearing vaginal mucosa; uterus small, mobile, normal contour; no adnexal masses palpated  Neurological:      Mental Status: She is alert.             Assessment     29 yo presents today for her annual exam     Plan    - Referral placed to Urology for frequent UTIs  - GCCT collected today, HIV, syphilis and hepatitis B/C ordered  - pap smear obtained  - Patient would like to keep IUD for the time being, discussed that is valid for 8 years

## 2024-06-21 ENCOUNTER — TELEPHONE (OUTPATIENT)
Dept: PSYCHIATRY | Facility: CLINIC | Age: 29
End: 2024-06-21

## 2024-06-21 ENCOUNTER — OFFICE VISIT (OUTPATIENT)
Dept: OBGYN CLINIC | Facility: CLINIC | Age: 29
End: 2024-06-21
Payer: COMMERCIAL

## 2024-06-21 VITALS
SYSTOLIC BLOOD PRESSURE: 122 MMHG | DIASTOLIC BLOOD PRESSURE: 82 MMHG | HEIGHT: 65 IN | OXYGEN SATURATION: 98 % | WEIGHT: 160.2 LBS | HEART RATE: 72 BPM | BODY MASS INDEX: 26.69 KG/M2

## 2024-06-21 DIAGNOSIS — Z01.419 WOMEN'S ANNUAL ROUTINE GYNECOLOGICAL EXAMINATION: Primary | ICD-10-CM

## 2024-06-21 DIAGNOSIS — Z11.3 ROUTINE SCREENING FOR STI (SEXUALLY TRANSMITTED INFECTION): ICD-10-CM

## 2024-06-21 DIAGNOSIS — N39.0 FREQUENT UTI: ICD-10-CM

## 2024-06-21 PROCEDURE — 87591 N.GONORRHOEAE DNA AMP PROB: CPT | Performed by: OBSTETRICS & GYNECOLOGY

## 2024-06-21 PROCEDURE — 87491 CHLMYD TRACH DNA AMP PROBE: CPT | Performed by: OBSTETRICS & GYNECOLOGY

## 2024-06-21 PROCEDURE — 99385 PREV VISIT NEW AGE 18-39: CPT | Performed by: OBSTETRICS & GYNECOLOGY

## 2024-06-21 PROCEDURE — G0145 SCR C/V CYTO,THINLAYER,RESCR: HCPCS | Performed by: OBSTETRICS & GYNECOLOGY

## 2024-06-24 LAB
C TRACH DNA SPEC QL NAA+PROBE: NEGATIVE
N GONORRHOEA DNA SPEC QL NAA+PROBE: NEGATIVE

## 2024-06-25 ENCOUNTER — APPOINTMENT (OUTPATIENT)
Dept: PHYSICAL THERAPY | Facility: REHABILITATION | Age: 29
End: 2024-06-25
Payer: OTHER MISCELLANEOUS

## 2024-06-27 ENCOUNTER — APPOINTMENT (OUTPATIENT)
Dept: PHYSICAL THERAPY | Facility: REHABILITATION | Age: 29
End: 2024-06-27
Payer: OTHER MISCELLANEOUS

## 2024-06-27 LAB
LAB AP GYN PRIMARY INTERPRETATION: NORMAL
Lab: NORMAL

## 2024-06-28 ENCOUNTER — TELEPHONE (OUTPATIENT)
Age: 29
End: 2024-06-28

## 2024-06-28 NOTE — TELEPHONE ENCOUNTER
Caller: Susan/[employer]    Doctor/Office: Dr. Patricio/David    #: [phone]      What needs to be faxed: OV note and Letter from 6/17/24    ATTN to: Susan    Fax#: [phone]      Documents were successfully e-faxed

## 2024-07-02 LAB
APOB+LDLR+PCSK9 GENE MUT ANL BLD/T: NOT DETECTED
BRCA1+BRCA2 DEL+DUP + FULL MUT ANL BLD/T: NOT DETECTED
MLH1+MSH2+MSH6+PMS2 GN DEL+DUP+FUL M: NOT DETECTED

## 2024-07-10 ENCOUNTER — OFFICE VISIT (OUTPATIENT)
Dept: INTERNAL MEDICINE CLINIC | Facility: OTHER | Age: 29
End: 2024-07-10
Payer: COMMERCIAL

## 2024-07-10 ENCOUNTER — APPOINTMENT (OUTPATIENT)
Dept: LAB | Facility: IMAGING CENTER | Age: 29
End: 2024-07-10
Payer: COMMERCIAL

## 2024-07-10 VITALS
DIASTOLIC BLOOD PRESSURE: 78 MMHG | TEMPERATURE: 98 F | WEIGHT: 168 LBS | SYSTOLIC BLOOD PRESSURE: 116 MMHG | BODY MASS INDEX: 27.99 KG/M2 | HEART RATE: 71 BPM | OXYGEN SATURATION: 99 % | HEIGHT: 65 IN

## 2024-07-10 DIAGNOSIS — Z11.3 ROUTINE SCREENING FOR STI (SEXUALLY TRANSMITTED INFECTION): ICD-10-CM

## 2024-07-10 DIAGNOSIS — F50.9 EATING DISORDER, UNSPECIFIED TYPE: ICD-10-CM

## 2024-07-10 DIAGNOSIS — F43.23 ADJUSTMENT REACTION WITH ANXIETY AND DEPRESSION: ICD-10-CM

## 2024-07-10 DIAGNOSIS — R61 HYPERHIDROSIS: ICD-10-CM

## 2024-07-10 DIAGNOSIS — E55.9 VITAMIN D DEFICIENCY: Primary | ICD-10-CM

## 2024-07-10 PROCEDURE — 36415 COLL VENOUS BLD VENIPUNCTURE: CPT

## 2024-07-10 PROCEDURE — 87389 HIV-1 AG W/HIV-1&-2 AB AG IA: CPT

## 2024-07-10 PROCEDURE — 99214 OFFICE O/P EST MOD 30 MIN: CPT | Performed by: NURSE PRACTITIONER

## 2024-07-10 PROCEDURE — 87340 HEPATITIS B SURFACE AG IA: CPT

## 2024-07-10 PROCEDURE — 86803 HEPATITIS C AB TEST: CPT

## 2024-07-10 PROCEDURE — 86780 TREPONEMA PALLIDUM: CPT

## 2024-07-10 RX ORDER — HYDROXYZINE HYDROCHLORIDE 25 MG/1
25 TABLET, FILM COATED ORAL EVERY 6 HOURS PRN
Qty: 30 TABLET | Refills: 0 | Status: SHIPPED | OUTPATIENT
Start: 2024-07-10

## 2024-07-10 RX ORDER — ERGOCALCIFEROL 1.25 MG/1
50000 CAPSULE ORAL WEEKLY
Qty: 12 CAPSULE | Refills: 0 | Status: SHIPPED | OUTPATIENT
Start: 2024-07-10

## 2024-07-10 RX ORDER — FLUOXETINE HYDROCHLORIDE 20 MG/1
20 CAPSULE ORAL DAILY
Qty: 90 CAPSULE | Refills: 0 | Status: SHIPPED | OUTPATIENT
Start: 2024-07-10

## 2024-07-10 NOTE — ASSESSMENT & PLAN NOTE
-Well controlled on Prozac and as needed Atarax  -Advised to not abruptly stop this medication  -Advised that this medication may take 4 to 6 weeks to start working  -Follow-up in 3 month or sooner if needed

## 2024-07-10 NOTE — PROGRESS NOTES
Assessment/Plan:    Eating disorder  -Encouraged to eat well-balanced diet, avoid binging and purging  -Referral to nutritionist    Adjustment reaction with anxiety and depression  -Well controlled on Prozac and as needed Atarax  -Advised to not abruptly stop this medication  -Advised that this medication may take 4 to 6 weeks to start working  -Follow-up in 3 month or sooner if needed    Vitamin D deficiency  Will start patient on ergocalciferol 30567 units to take once weekly for the next 12 weeks, patient is to get follow-up blood work which will include a vitamin-D level after she finishes 12 week course.  After patient finishes vitamin-D supplementation with 99465 units, patient is to take 2000 International Units of vitamin-D daily.      Hyperhidrosis  -TSH within normal range   -will start Drysol               Diagnoses and all orders for this visit:    Vitamin D deficiency  -     ergocalciferol (ERGOCALCIFEROL) 1.25 MG (71288 UT) capsule; Take 1 capsule (50,000 Units total) by mouth once a week    Adjustment reaction with anxiety and depression  -     FLUoxetine (PROzac) 20 mg capsule; Take 1 capsule (20 mg total) by mouth daily  -     hydrOXYzine HCL (ATARAX) 25 mg tablet; Take 1 tablet (25 mg total) by mouth every 6 (six) hours as needed for anxiety    Hyperhidrosis  -     aluminum chloride (DRYSOL) 20 % external solution; Apply topically daily at bedtime    Eating disorder, unspecified type          Subjective:      Patient ID: Mercedes Lynne is a 29 y.o. female.    Patient presents today to follow-up on depression and anxiety and review blood work.    Depression/anxiety-   Patient was started on Prozac as well as as needed Atarax, she feels that the medications have been effective without side effect however she does report that she has been feeling anxious at work  Note from last office visit:  she feels more anxious lately, which sometimes results in anger, reports good support system, she reports  that she over thinks, no recent panic attacks, she reports that she does not sleep well due to anxiety     She reports she is out of work due to left ankle injury, work related, follows podiatry and PT     Started in 2022 has been going through a lot of stress at that time, she reports that her weight has been stable at this point, she does report that stress has improved   She reports that her appetite is good   She reports that she eats about 1-2 meals a day, history of purging, last happened 5 years ago   She does report history of binge eating as well    Weights 3/25/20-235lbs  5/25/  6/5/  Was given referral to nutritionist however she missed her appointment she does need to call and reschedule    Umbilical hernia repair- Dr. Shetty    She does report excessive sweating, TSH within normal range    Vitamin D deficiency-currently not taking supplementation        The following portions of the patient's history were reviewed and updated as appropriate: allergies, current medications, past family history, past medical history, past social history, past surgical history, and problem list.    Review of Systems   Constitutional:  Negative for activity change, appetite change, chills, diaphoresis and fever.   HENT:  Negative for congestion, ear discharge, ear pain, postnasal drip, rhinorrhea, sinus pressure, sinus pain and sore throat.    Eyes:  Negative for pain, discharge, itching and visual disturbance.   Respiratory:  Negative for cough, chest tightness, shortness of breath and wheezing.    Cardiovascular:  Negative for chest pain, palpitations and leg swelling.   Gastrointestinal:  Negative for abdominal pain, constipation, diarrhea, nausea and vomiting.   Endocrine: Negative for polydipsia, polyphagia and polyuria.   Genitourinary:  Negative for difficulty urinating, dysuria and urgency.   Musculoskeletal:  Negative for arthralgias, back pain and neck pain.   Skin:  Negative for rash and wound.  "  Neurological:  Negative for dizziness, weakness, numbness and headaches.   Psychiatric/Behavioral:  The patient is nervous/anxious.          Past Medical History:   Diagnosis Date    Anxiety 2010    Started around 13 y/o    Chlamydia 04/2022    Ended a 3 year relationship & my ex notified me he was positive    Depression Approx 2010    History of sprained ankle     history of sprained left foot         Current Outpatient Medications:     aluminum chloride (DRYSOL) 20 % external solution, Apply topically daily at bedtime, Disp: 35 mL, Rfl: 0    ergocalciferol (ERGOCALCIFEROL) 1.25 MG (96031 UT) capsule, Take 1 capsule (50,000 Units total) by mouth once a week, Disp: 12 capsule, Rfl: 0    FLUoxetine (PROzac) 20 mg capsule, Take 1 capsule (20 mg total) by mouth daily, Disp: 90 capsule, Rfl: 0    hydrOXYzine HCL (ATARAX) 25 mg tablet, Take 1 tablet (25 mg total) by mouth every 6 (six) hours as needed for anxiety, Disp: 30 tablet, Rfl: 0    No Known Allergies    Social History   Past Surgical History:   Procedure Laterality Date    HERNIA REPAIR      umbilical, unsure if mesh    WOUND DEBRIDEMENT N/A 04/25/2023    Procedure: EXCISIONAL DEBRIDEMENT UMBILICAL HERNIA;  Surgeon: Octavia Shetty MD;  Location: [facility];  Service: General     Family History   Problem Relation Age of Onset    Hypertension Mother     Asthma Mother     Suicide Attempts Father     Asthma Sister     Heart disease Maternal Grandmother     Asthma Maternal Grandmother     Arthritis Maternal Grandmother     Hypertension Maternal Grandfather     Diabetes Maternal Grandfather     No Known Problems Paternal Grandmother     No Known Problems Paternal Grandfather     Lung cancer Other     Other Other         Mouth cancer       Objective:  /78 (BP Location: Left arm, Patient Position: Sitting, Cuff Size: Adult)   Pulse 71   Temp 98 °F (36.7 °C) (Temporal)   Ht 5' 5\" (1.651 m)   Wt 76.2 kg (168 lb)   LMP  (LMP Unknown)   SpO2 99%   BMI 27.96 " kg/m²     Recent Results (from the past 1344 hour(s))   POCT urine dip    Collection Time: 05/24/24 12:16 PM   Result Value Ref Range    LEUKOCYTE ESTERASE,UA large     NITRITE,UA negative     SL AMB POCT UROBILINOGEN 0.2     POCT URINE PROTEIN 100      PH,UA 5.0     BLOOD,UA large     SPECIFIC GRAVITY,UA 1.015     KETONES,UA negative     BILIRUBIN,UA negative     GLUCOSE, UA negative      COLOR,UA yellow     CLARITY,UA cloudy    Urine culture    Collection Time: 05/24/24 12:19 PM    Specimen: Urine, Clean Catch   Result Value Ref Range    Urine Culture >100,000 cfu/ml Proteus mirabilis (A)     Urine Culture 20,000-29,000 cfu/ml        Susceptibility    Proteus mirabilis - MERLE     ZID Performed Yes       Ampicillin ($$) <=8.00 Susceptible ug/ml     Aztreonam ($$$)  <=4 Susceptible ug/ml     Cefazolin ($) <=2.00 Susceptible ug/ml     Ciprofloxacin ($)  <=0.25 Susceptible ug/ml     Gentamicin ($$) <=2 Susceptible ug/ml     Levofloxacin ($) <=0.50 Susceptible ug/ml     Nitrofurantoin >64 Resistant ug/ml     Tetracycline >8 Resistant ug/ml     Tobramycin ($) <=2 Susceptible ug/ml     Trimethoprim + Sulfamethoxazole ($$$) <=0.5/9.5 Susceptible ug/ml   CBC and differential    Collection Time: 06/18/24 11:05 AM   Result Value Ref Range    WBC 6.91 4.31 - 10.16 Thousand/uL    RBC 4.60 3.81 - 5.12 Million/uL    Hemoglobin 13.4 11.5 - 15.4 g/dL    Hematocrit 41.4 34.8 - 46.1 %    MCV 90 82 - 98 fL    MCH 29.1 26.8 - 34.3 pg    MCHC 32.4 31.4 - 37.4 g/dL    RDW 12.5 11.6 - 15.1 %    MPV 12.0 8.9 - 12.7 fL    Platelets 191 149 - 390 Thousands/uL    nRBC 0 /100 WBCs    Segmented % 64 43 - 75 %    Immature Grans % 0 0 - 2 %    Lymphocytes % 27 14 - 44 %    Monocytes % 5 4 - 12 %    Eosinophils Relative 3 0 - 6 %    Basophils Relative 1 0 - 1 %    Absolute Neutrophils 4.43 1.85 - 7.62 Thousands/µL    Absolute Immature Grans 0.01 0.00 - 0.20 Thousand/uL    Absolute Lymphocytes 1.89 0.60 - 4.47 Thousands/µL    Absolute Monocytes  0.34 0.17 - 1.22 Thousand/µL    Eosinophils Absolute 0.19 0.00 - 0.61 Thousand/µL    Basophils Absolute 0.05 0.00 - 0.10 Thousands/µL   Lipid panel    Collection Time: 06/18/24 11:05 AM   Result Value Ref Range    Cholesterol 144 See Comment mg/dL    Triglycerides 60 See Comment mg/dL    HDL, Direct 45 (L) >=50 mg/dL    LDL Calculated 87 0 - 100 mg/dL    Non-HDL-Chol (CHOL-HDL) 99 mg/dl   TSH, 3rd generation with Free T4 reflex    Collection Time: 06/18/24 11:05 AM   Result Value Ref Range    TSH 3RD GENERATON 2.529 0.450 - 4.500 uIU/mL   Vitamin D 25 hydroxy    Collection Time: 06/18/24 11:05 AM   Result Value Ref Range    Vit D, 25-Hydroxy 15.5 (L) 30.0 - 100.0 ng/mL   [facility] Molecular Screen    Collection Time: 06/18/24 11:05 AM    Specimen: Blood   Result Value Ref Range    BARRAZA SYNDROME DNA ANALYSIS Not Detected     HEREDITARY BREAST & OVARIAN CANCER DNA ANALYSIS Not Detected     FAMILIAL HYPERCHOLESTEROLEMIA DNA ANALYSIS Not Detected    Comprehensive metabolic panel    Collection Time: 06/18/24 11:05 AM   Result Value Ref Range    Sodium 139 135 - 147 mmol/L    Potassium 4.2 3.5 - 5.3 mmol/L    Chloride 103 96 - 108 mmol/L    CO2 26 21 - 32 mmol/L    ANION GAP 10 4 - 13 mmol/L    BUN 14 5 - 25 mg/dL    Creatinine 0.88 0.60 - 1.30 mg/dL    Glucose, Fasting 82 65 - 99 mg/dL    Calcium 9.0 8.4 - 10.2 mg/dL    AST 15 13 - 39 U/L    ALT 9 7 - 52 U/L    Alkaline Phosphatase 56 34 - 104 U/L    Total Protein 6.9 6.4 - 8.4 g/dL    Albumin 4.4 3.5 - 5.0 g/dL    Total Bilirubin 0.43 0.20 - 1.00 mg/dL    eGFR 90 ml/min/1.73sq m   Vitamin B12    Collection Time: 06/18/24 11:05 AM   Result Value Ref Range    Vitamin B-12 318 180 - 914 pg/mL   TIBC Panel (incl. Iron, TIBC, % Iron Saturation)    Collection Time: 06/18/24 11:05 AM   Result Value Ref Range    Iron Saturation 32 15 - 50 %    TIBC 264 250 - 450 ug/dL    Iron 84 50 - 212 ug/dL    UIBC 180 155 - 355 ug/dL   Ferritin    Collection Time: 06/18/24 11:05 AM   Result  Value Ref Range    Ferritin 94 11 - 307 ng/mL   Chlamydia/GC amplified DNA by PCR    Collection Time: 06/21/24  1:53 PM    Specimen: Endocervical; Genital   Result Value Ref Range    N gonorrhoeae, DNA Probe Negative Negative    Chlamydia trachomatis, DNA Probe Negative Negative   Liquid-based pap, screening    Collection Time: 06/21/24  1:53 PM   Result Value Ref Range    Case Report       Gynecologic Cytology Report                       Case: [id number]                                  Authorizing Provider:  Brianna Rascon MD       Collected:           06/21/2024 [facility]              Ordering Location:     [facility]     Received:            06/21/2024 [facility]                                     OB/[facility] [place]                                                             First Screen:          Sisi Wujolenes                                                                Specimen:    LIQUID-BASED PAP, SCREENING, Cervix                                                        Primary Interpretation Negative for intraepithelial lesion or malignancy     Specimen Adequacy       Satisfactory for evaluation. Endocervical/transformation zone component present.    Note       Screening performed at [facility], [address] [address].        Additional Information       [employer] FDA approved ,  and ThinPrep Imaging Duo System are utilized with strict adherence to the 's instruction manual to prepare gynecologic and non-gynecologic cytology specimens for the production of ThinPrep slides as well as for gynecologic ThinPrep imaging. These processes have been validated by our laboratory and/or by the .  The Pap test is not a diagnostic procedure and should not be used as the sole means to detect cervical cancer. It is only a screening procedure to aid in the detection of cervical cancer and its precursors. Both false-negative and false-positive results have been  experienced. Your patient's test result should be interpreted in this context together with the history and clinical findings.              Physical Exam  Constitutional:       General: She is not in acute distress.     Appearance: She is well-developed. She is not diaphoretic.   HENT:      Head: Normocephalic and atraumatic.      Right Ear: External ear normal.      Left Ear: External ear normal.      Nose: Nose normal.      Mouth/Throat:      Mouth: Mucous membranes are moist.      Pharynx: No oropharyngeal exudate or posterior oropharyngeal erythema.   Eyes:      General:         Right eye: No discharge.         Left eye: No discharge.      Conjunctiva/sclera: Conjunctivae normal.      Pupils: Pupils are equal, round, and reactive to light.   Neck:      Thyroid: No thyromegaly.   Cardiovascular:      Rate and Rhythm: Normal rate and regular rhythm.      Heart sounds: Normal heart sounds. No murmur heard.     No friction rub. No gallop.   Pulmonary:      Effort: Pulmonary effort is normal. No respiratory distress.      Breath sounds: Normal breath sounds. No stridor. No wheezing or rales.   Abdominal:      General: Bowel sounds are normal. There is no distension.      Palpations: Abdomen is soft.      Tenderness: There is no abdominal tenderness.   Musculoskeletal:      Cervical back: Normal range of motion and neck supple.   Lymphadenopathy:      Cervical: No cervical adenopathy.   Skin:     General: Skin is warm and dry.      Findings: No erythema or rash.   Neurological:      Mental Status: She is alert and oriented to person, place, and time.   Psychiatric:         Behavior: Behavior normal.         Thought Content: Thought content normal.         Judgment: Judgment normal.

## 2024-07-10 NOTE — ASSESSMENT & PLAN NOTE
Will start patient on ergocalciferol 23473 units to take once weekly for the next 12 weeks, patient is to get follow-up blood work which will include a vitamin-D level after she finishes 12 week course.  After patient finishes vitamin-D supplementation with 99534 units, patient is to take 2000 International Units of vitamin-D daily.

## 2024-07-11 LAB
HBV SURFACE AG SER QL: NORMAL
HCV AB SER QL: NORMAL
HIV 1+2 AB+HIV1 P24 AG SERPL QL IA: NORMAL
HIV 2 AB SERPL QL IA: NORMAL
HIV1 AB SERPL QL IA: NORMAL
HIV1 P24 AG SERPL QL IA: NORMAL
TREPONEMA PALLIDUM IGG+IGM AB [PRESENCE] IN SERUM OR PLASMA BY IMMUNOASSAY: NORMAL

## 2024-09-07 ENCOUNTER — PATIENT MESSAGE (OUTPATIENT)
Dept: INTERNAL MEDICINE CLINIC | Facility: OTHER | Age: 29
End: 2024-09-07

## 2024-09-07 DIAGNOSIS — F43.23 ADJUSTMENT REACTION WITH ANXIETY AND DEPRESSION: ICD-10-CM

## 2024-09-08 RX ORDER — HYDROXYZINE HYDROCHLORIDE 25 MG/1
25 TABLET, FILM COATED ORAL EVERY 6 HOURS PRN
Qty: 30 TABLET | Refills: 5 | Status: SHIPPED | OUTPATIENT
Start: 2024-09-08

## 2024-09-09 NOTE — PATIENT COMMUNICATION
Paperwork in front at [facility] office.     Needs forms fee and patient signature.     Please call patient to let her know that there paperwork is ready & what the fee is.     Thank you!

## 2024-09-20 ENCOUNTER — TELEPHONE (OUTPATIENT)
Age: 29
End: 2024-09-20

## 2024-09-20 NOTE — TELEPHONE ENCOUNTER
Caller: January/Cristy/Work Comp    Doctor: Dr. Patricio    Reason for call: asking if she was discharged in June or had any future appts/yes she was discharged and no future appts. She needed nothing faxed.    Call back#: [phone]

## 2024-10-16 ENCOUNTER — OFFICE VISIT (OUTPATIENT)
Dept: INTERNAL MEDICINE CLINIC | Facility: OTHER | Age: 29
End: 2024-10-16
Payer: COMMERCIAL

## 2024-10-16 VITALS
HEART RATE: 65 BPM | DIASTOLIC BLOOD PRESSURE: 62 MMHG | OXYGEN SATURATION: 98 % | SYSTOLIC BLOOD PRESSURE: 120 MMHG | WEIGHT: 170 LBS | BODY MASS INDEX: 28.29 KG/M2 | TEMPERATURE: 97.8 F

## 2024-10-16 DIAGNOSIS — Z23 IMMUNIZATION DUE: Primary | ICD-10-CM

## 2024-10-16 DIAGNOSIS — F43.23 ADJUSTMENT REACTION WITH ANXIETY AND DEPRESSION: ICD-10-CM

## 2024-10-16 DIAGNOSIS — M79.10 MUSCLE TENSION PAIN: ICD-10-CM

## 2024-10-16 DIAGNOSIS — F50.9 EATING DISORDER, UNSPECIFIED TYPE: ICD-10-CM

## 2024-10-16 PROCEDURE — 90656 IIV3 VACC NO PRSV 0.5 ML IM: CPT

## 2024-10-16 PROCEDURE — 99214 OFFICE O/P EST MOD 30 MIN: CPT | Performed by: NURSE PRACTITIONER

## 2024-10-16 PROCEDURE — 90471 IMMUNIZATION ADMIN: CPT

## 2024-10-16 RX ORDER — FLUOXETINE 10 MG/1
30 CAPSULE ORAL DAILY
Qty: 270 CAPSULE | Refills: 1 | Status: SHIPPED | OUTPATIENT
Start: 2024-10-16 | End: 2025-01-14

## 2024-10-16 RX ORDER — IBUPROFEN 600 MG/1
200 TABLET, FILM COATED ORAL ONCE
COMMUNITY
Start: 2024-08-05

## 2024-10-16 NOTE — ASSESSMENT & PLAN NOTE
-Encouraged to eat well-balanced diet, avoid binging and purging  -Referral to nutritionist

## 2024-10-16 NOTE — ASSESSMENT & PLAN NOTE
-Recommend gentle stretching, heat or ice for discomfort  -Referral to PT    Orders:    Ambulatory Referral to Physical Therapy; Future

## 2024-10-16 NOTE — ASSESSMENT & PLAN NOTE
-Increase dose of Prozac to 30mg and as needed Atarax  -Advised to not abruptly stop this medication  -Advised that this medication may take 4 to 6 weeks to start working  -Follow-up in 6month or sooner if needed  Orders:    FLUoxetine (PROzac) 10 mg capsule; Take 3 capsules (30 mg total) by mouth daily

## 2024-10-16 NOTE — PROGRESS NOTES
Ambulatory Visit  Name: Mercedes Lynne      : 1995      MRN: [id number]  Encounter Provider: EVIN Rob  Encounter Date: 10/16/2024   Encounter department: [facility]    Assessment & Plan  Immunization due    Orders:    influenza vaccine preservative-free 0.5 mL IM (Fluzone, Afluria, Fluarix, Flulaval)    Adjustment reaction with anxiety and depression  -Increase dose of Prozac to 30mg and as needed Atarax  -Advised to not abruptly stop this medication  -Advised that this medication may take 4 to 6 weeks to start working  -Follow-up in 6month or sooner if needed  Orders:    FLUoxetine (PROzac) 10 mg capsule; Take 3 capsules (30 mg total) by mouth daily    Muscle tension pain  -Recommend gentle stretching, heat or ice for discomfort  -Referral to PT    Orders:    Ambulatory Referral to Physical Therapy; Future    Eating disorder, unspecified type  -Encouraged to eat well-balanced diet, avoid binging and purging  -Referral to nutritionist            History of Present Illness     Patient presents today to follow-up on depression and anxiety and review blood work.        Depression/anxiety-   She reports that she is doing well on Prozac but does report some continued racing thoughts  Note from last office visit:  Patient was started on Prozac as well as as needed Atarax, she feels that the medications have been effective without side effect however she does report that she has been feeling anxious at work  Note from last office visit:  she feels more anxious lately, which sometimes results in anger, reports good support system, she reports that she over thinks, no recent panic attacks, she reports that she does not sleep well due to anxiety     She reports she is out of work due to left ankle injury, work related, follows podiatry and PT     Started in  has been going through a lot of stress at that time, she reports that her weight has been stable  at this point, she does report that stress has improved   She reports that her appetite is good   She reports that she eats about 1-2 meals a day, history of purging, last happened 5 years ago   She does report history of binge eating as well    Weights 3/25/20-235lbs  5/25/  6/5/  Was given referral to nutritionist however she missed her appointment she does need to call and reschedule    Umbilical hernia repair- Dr. Shetty    She does report excessive sweating, TSH within normal range    Vitamin D deficiency-currently not taking supplementation    She reports that her shoulder feels tight on the left side         History obtained from : patient  Review of Systems   Constitutional:  Negative for activity change, appetite change, chills, diaphoresis and fever.   HENT:  Negative for congestion, ear discharge, ear pain, postnasal drip, rhinorrhea, sinus pressure, sinus pain and sore throat.    Eyes:  Negative for pain, discharge, itching and visual disturbance.   Respiratory:  Negative for cough, chest tightness, shortness of breath and wheezing.    Cardiovascular:  Negative for chest pain, palpitations and leg swelling.   Gastrointestinal:  Negative for abdominal pain, constipation, diarrhea, nausea and vomiting.   Endocrine: Negative for polydipsia, polyphagia and polyuria.   Genitourinary:  Negative for difficulty urinating, dysuria and urgency.   Musculoskeletal:  Negative for arthralgias, back pain and neck pain.   Skin:  Negative for rash and wound.   Neurological:  Negative for dizziness, weakness, numbness and headaches.   Psychiatric/Behavioral:  Negative for dysphoric mood. The patient is nervous/anxious.        Objective     /62 (BP Location: Right arm, Patient Position: Sitting, Cuff Size: Standard)   Pulse 65   Temp 97.8 °F (36.6 °C) (Temporal)   Wt 77.1 kg (170 lb)   SpO2 98%   BMI 28.29 kg/m²     Physical Exam  Constitutional:       General: She is not in acute distress.      Appearance: She is well-developed. She is not diaphoretic.   HENT:      Head: Normocephalic and atraumatic.      Right Ear: External ear normal.      Left Ear: External ear normal.      Nose: Nose normal.      Mouth/Throat:      Mouth: Mucous membranes are moist.      Pharynx: No oropharyngeal exudate or posterior oropharyngeal erythema.   Eyes:      General:         Right eye: No discharge.         Left eye: No discharge.      Conjunctiva/sclera: Conjunctivae normal.      Pupils: Pupils are equal, round, and reactive to light.   Neck:      Thyroid: No thyromegaly.   Cardiovascular:      Rate and Rhythm: Normal rate and regular rhythm.      Heart sounds: Normal heart sounds. No murmur heard.     No friction rub. No gallop.   Pulmonary:      Effort: Pulmonary effort is normal. No respiratory distress.      Breath sounds: Normal breath sounds. No stridor. No wheezing or rales.   Abdominal:      General: Bowel sounds are normal. There is no distension.      Palpations: Abdomen is soft.      Tenderness: There is no abdominal tenderness.   Musculoskeletal:        Arms:       Cervical back: Normal range of motion and neck supple.   Lymphadenopathy:      Cervical: No cervical adenopathy.   Skin:     General: Skin is warm and dry.      Findings: No erythema or rash.   Neurological:      Mental Status: She is alert and oriented to person, place, and time.   Psychiatric:         Behavior: Behavior normal.         Thought Content: Thought content normal.         Judgment: Judgment normal.

## 2024-10-18 ENCOUNTER — TELEPHONE (OUTPATIENT)
Age: 29
End: 2024-10-18

## 2024-11-04 ENCOUNTER — OFFICE VISIT (OUTPATIENT)
Age: 29
End: 2024-11-04
Payer: COMMERCIAL

## 2024-11-04 VITALS
DIASTOLIC BLOOD PRESSURE: 76 MMHG | WEIGHT: 177.6 LBS | BODY MASS INDEX: 28.54 KG/M2 | SYSTOLIC BLOOD PRESSURE: 104 MMHG | TEMPERATURE: 97.9 F | HEIGHT: 66 IN | HEART RATE: 93 BPM | OXYGEN SATURATION: 98 %

## 2024-11-04 DIAGNOSIS — Z00.00 ANNUAL PHYSICAL EXAM: ICD-10-CM

## 2024-11-04 DIAGNOSIS — Z11.1 ENCOUNTER FOR PPD TEST: Primary | ICD-10-CM

## 2024-11-04 PROCEDURE — 99395 PREV VISIT EST AGE 18-39: CPT | Performed by: NURSE PRACTITIONER

## 2024-11-04 PROCEDURE — 86580 TB INTRADERMAL TEST: CPT

## 2024-11-04 NOTE — PROGRESS NOTES
[facility] - [facility] PRIMARY CARE [place]  Well Adult Female Physical Visit  Patient ID: Mercedes Lynne    : 1995  Age/Gender: 29 y.o. female     DATE: 2024      Assessment/Plan:    Annual physical exam  -up to date with fasting blood work   -Continue with well-balanced diet, encouraged daily exercise  -She is up-to-date with cervical cancer screening  -She is up-to-date with vaccinations  -Encourage monthly self breast examination  -Had first TB placed today, forms in folder in [place]  -Follow-up in 1 year for annual physical or sooner if needed       Diagnoses and all orders for this visit:    Encounter for PPD test  -     TB Skin Test    Annual physical exam          Subjective:     Mercedes Lynne is a 29 y.o. female who presents to the office on 2024 for a health maintenance physical.    HPI  The following portions of the patient's history were reviewed and updated as appropriate: allergies, current medications, past family history, past medical history, past social history, past surgical history, and problem list.    Pt reports overall health: good   Last Physical: unknown   Last GYN Exam:  2024    Healthy Diet:  well balanced  Routine Exercise:  denies  Weight Concerns:  denies     Problems with vision:  denies, wears glasses  Last Eye Exam:      Problems with Hearing:  denies    Routine Dental Exams:  every 6 months    Smoking History:  denies  ETOH Use:  rare  Illegal Drug Use:  denies  Caffeine Use:  not every day    Reproductive Health:    Last PAP:    History of abnormal PAP:  denies  LMP:  2018 IUD  Sexually Active:  currently  Contraceptive:  IUD   Problems with menstrual cycle:  N/A  Vaginal Discharge:  denies    Grandmother had breast CA  Self Breast Exams:  denies     Depression Screening:  PHQ-2/9 Depression Screening    Little interest or pleasure in doing things: 1 - several days  Feeling down, depressed, or hopeless: 0  - not at all  Trouble falling or staying asleep, or sleeping too much: 1 - several days  Feeling tired or having little energy: 1 - several days  Poor appetite or overeatin - several days  Feeling bad about yourself - or that you are a failure or have let yourself or your family down: 0 - not at all  Trouble concentrating on things, such as reading the newspaper or watching television: 0 - not at all  Moving or speaking so slowly that other people could have noticed. Or the opposite - being so fidgety or restless that you have been moving around a lot more than usual: 0 - not at all  Thoughts that you would be better off dead, or of hurting yourself in some way: 0 - not at all  PHQ-9 Score: 4  PHQ-9 Interpretation: No or Minimal depression             Family History of Colon CA:  denies    Last Labs:      Review of Systems   Constitutional:  Negative for activity change, appetite change, chills, diaphoresis and fever.   HENT:  Negative for congestion, ear discharge, ear pain, postnasal drip, rhinorrhea, sinus pressure, sinus pain and sore throat.    Eyes:  Negative for pain, discharge, itching and visual disturbance.   Respiratory:  Negative for cough, chest tightness, shortness of breath and wheezing.    Cardiovascular:  Negative for chest pain, palpitations and leg swelling.   Gastrointestinal:  Negative for abdominal pain, constipation, diarrhea, nausea and vomiting.   Endocrine: Negative for polydipsia, polyphagia and polyuria.   Genitourinary:  Negative for difficulty urinating, dysuria, hematuria and urgency.   Musculoskeletal:  Negative for arthralgias, back pain and neck pain.   Skin:  Negative for rash and wound.   Neurological:  Negative for dizziness, weakness, numbness and headaches.         Patient Active Problem List   Diagnosis    Screening for metabolic disorder    Eating disorder    Adjustment reaction with anxiety and depression    Sprain of tibiofibular ligament of left ankle    Vitamin D  deficiency    Hyperhidrosis    Muscle tension pain    Annual physical exam       Past Medical History:   Diagnosis Date    Anxiety 2010    Started around 15 y/o    Chlamydia 04/2022    Ended a 3 year relationship & my ex notified me he was positive    Depression Approx 2010    History of sprained ankle     history of sprained left foot       Past Surgical History:   Procedure Laterality Date    HERNIA REPAIR      umbilical, unsure if mesh    WOUND DEBRIDEMENT N/A 04/25/2023    Procedure: EXCISIONAL DEBRIDEMENT UMBILICAL HERNIA;  Surgeon: Octavia Shetty MD;  Location: [facility] OR;  Service: General         Current Outpatient Medications:     FLUoxetine (PROzac) 10 mg capsule, Take 3 capsules (30 mg total) by mouth daily, Disp: 270 capsule, Rfl: 1    hydrOXYzine HCL (ATARAX) 25 mg tablet, Take 1 tablet (25 mg total) by mouth every 6 (six) hours as needed for anxiety, Disp: 30 tablet, Rfl: 5    ibuprofen (MOTRIN) 600 mg tablet, Take 200 mg by mouth once, Disp: , Rfl:     aluminum chloride (DRYSOL) 20 % external solution, Apply topically daily at bedtime, Disp: 35 mL, Rfl: 0    ergocalciferol (ERGOCALCIFEROL) 1.25 MG (23943 UT) capsule, Take 1 capsule (50,000 Units total) by mouth once a week (Patient not taking: Reported on 11/4/2024), Disp: 12 capsule, Rfl: 0    No Known Allergies    Social History     Socioeconomic History    Marital status: Single     Spouse name: None    Number of children: None    Years of education: None    Highest education level: None   Occupational History    None   Tobacco Use    Smoking status: Never     Passive exposure: Never    Smokeless tobacco: Never   Vaping Use    Vaping status: Former    Substances: Nicotine, Flavoring   Substance and Sexual Activity    Alcohol use: Yes     Comment: rarely    Drug use: Yes     Types: Marijuana     Comment: Assists with the anxiety    Sexual activity: Yes     Partners: Male     Birth control/protection: I.U.D.   Other Topics Concern    None   Social  History Narrative    No Denominational pref      Social Determinants of Health     Financial Resource Strain: Not on file   Food Insecurity: Not on file   Transportation Needs: Not on file   Physical Activity: Not on file   Stress: Not on file   Social Connections: Not on file   Intimate Partner Violence: Not on file   Housing Stability: Not on file       Family History   Problem Relation Age of Onset    Hypertension Mother     Asthma Mother     Suicide Attempts Father     Hypertension Father     Asthma Sister     Heart disease Maternal Grandmother     Asthma Maternal Grandmother     Arthritis Maternal Grandmother     Hypertension Maternal Grandfather     Diabetes Maternal Grandfather     No Known Problems Paternal Grandmother     No Known Problems Paternal Grandfather     Lung cancer Other     Other Other         Mouth cancer       Immunization History   Administered Date(s) Administered    COVID-19 PFIZER VACCINE 0.3 ML IM 04/30/2021, 05/21/2021    COVID-19 Pfizer vac (Sameer-sucrose, gray cap) 12 yr+ IM 02/17/2022    HPV Quadrivalent 08/13/2009, 10/14/2009, 02/17/2010    Influenza Quadrivalent, 6-35 Months IM 12/05/2017    Influenza, injectable, quadrivalent, preservative free 0.5 mL 12/20/2018    Influenza, seasonal, injectable, preservative free 10/25/2016, 10/16/2024    Meningococcal MCV4, Unspecified 08/13/2009    Tdap 08/13/2009, 08/11/2021    Tuberculin Skin Test-PPD Intradermal 11/04/2024    Varicella 09/03/2010        Health Maintenance   Topic Date Due    Annual Physical  Never done    PT PLAN OF CARE  06/13/2024    COVID-19 Vaccine (4 - 2023-24 season) 09/01/2024    Depression Screening  11/04/2025    Cervical Cancer Screening  06/21/2027    DTaP,Tdap,and Td Vaccines (3 - Td or Tdap) 08/11/2031    Zoster Vaccine (1 of 2) 06/30/2047    RSV Vaccine Age 60+ Years (1 - 1-dose 60+ series) 06/30/2057    HIV Screening  Completed    Hepatitis C Screening  Completed    Influenza Vaccine  Completed    HPV Vaccine   "Completed    RSV Vaccine age 0-20 Months  Aged Out    Pneumococcal Vaccine: Pediatrics (0 to 5 Years) and At-Risk Patients (6 to 64 Years)  Aged Out    HIB Vaccine  Aged Out    IPV Vaccine  Aged Out    Hepatitis A Vaccine  Aged Out    Meningococcal ACWY Vaccine  Aged Out         Objective:  Vitals:    11/04/24 0813   BP: 104/76   BP Location: Left arm   Patient Position: Sitting   Cuff Size: Standard   Pulse: 93   Temp: 97.9 °F (36.6 °C)   TempSrc: Temporal   SpO2: 98%   Weight: 80.6 kg (177 lb 9.6 oz)   Height: 5' 5.6\" (1.666 m)     Wt Readings from Last 3 Encounters:   11/04/24 80.6 kg (177 lb 9.6 oz)   10/16/24 77.1 kg (170 lb)   07/10/24 76.2 kg (168 lb)     Body mass index is 29.02 kg/m².  No results found.       Physical Exam  Constitutional:       General: She is not in acute distress.     Appearance: She is well-developed. She is not diaphoretic.   HENT:      Head: Normocephalic and atraumatic.      Right Ear: External ear normal.      Left Ear: External ear normal.      Nose: Nose normal.      Mouth/Throat:      Mouth: Mucous membranes are moist.      Pharynx: No oropharyngeal exudate or posterior oropharyngeal erythema.   Eyes:      General:         Right eye: No discharge.         Left eye: No discharge.      Conjunctiva/sclera: Conjunctivae normal.      Pupils: Pupils are equal, round, and reactive to light.   Neck:      Thyroid: No thyromegaly.   Cardiovascular:      Rate and Rhythm: Normal rate and regular rhythm.      Heart sounds: Normal heart sounds. No murmur heard.     No friction rub. No gallop.   Pulmonary:      Effort: Pulmonary effort is normal. No respiratory distress.      Breath sounds: Normal breath sounds. No stridor. No wheezing or rales.   Abdominal:      General: Bowel sounds are normal. There is no distension.      Palpations: Abdomen is soft.      Tenderness: There is no abdominal tenderness.   Musculoskeletal:      Cervical back: Normal range of motion and neck supple. "   Lymphadenopathy:      Cervical: No cervical adenopathy.   Skin:     General: Skin is warm and dry.      Findings: No erythema or rash.   Neurological:      Mental Status: She is alert and oriented to person, place, and time.   Psychiatric:         Behavior: Behavior normal.         Thought Content: Thought content normal.         Judgment: Judgment normal.             Future Appointments   Date Time Provider Department Center   11/6/2024  9:00 AM [facility] NURSE ROGER [facility]   1/9/2025 11:00 AM Jaqueline Bergman PA-C [facility]-[place]   4/16/2025  1:20 PM EVIN Rob [facility]-[place]   6/27/2025  2:30 PM Brianna Rascon MD  OBGYN  Practice-[place]       EVIN Rob  [facility] PRIMARY CARE [place]    Patient Care Team:  EVIN Rob as PCP - General (Family Medicine)  Denise Mari MD as PCP - PCP-[facility] ([facility])  EVIN Rob as PCP - PCP-[employer] ([facility])  Andreas Schumacher PA-C

## 2024-11-04 NOTE — ASSESSMENT & PLAN NOTE
-up to date with fasting blood work   -Continue with well-balanced diet, encouraged daily exercise  -She is up-to-date with cervical cancer screening  -She is up-to-date with vaccinations  -Encourage monthly self breast examination  -Had first TB placed today, forms in folder in [place]  -Follow-up in 1 year for annual physical or sooner if needed

## 2024-11-06 ENCOUNTER — CLINICAL SUPPORT (OUTPATIENT)
Age: 29
End: 2024-11-06

## 2024-11-06 DIAGNOSIS — Z11.1 PPD SCREENING TEST: Primary | ICD-10-CM

## 2024-11-06 LAB
INDURATION: 0 MM
TB SKIN TEST: NEGATIVE

## 2024-11-06 PROCEDURE — NURSE

## 2024-11-12 ENCOUNTER — EVALUATION (OUTPATIENT)
Dept: PHYSICAL THERAPY | Facility: REHABILITATION | Age: 29
End: 2024-11-12
Payer: COMMERCIAL

## 2024-11-12 DIAGNOSIS — M79.10 MUSCLE TENSION PAIN: Primary | ICD-10-CM

## 2024-11-12 DIAGNOSIS — G89.29 CHRONIC PAIN OF LEFT ANKLE: ICD-10-CM

## 2024-11-12 DIAGNOSIS — M25.572 CHRONIC PAIN OF LEFT ANKLE: ICD-10-CM

## 2024-11-12 PROCEDURE — 97110 THERAPEUTIC EXERCISES: CPT | Performed by: PHYSICAL THERAPIST

## 2024-11-12 PROCEDURE — 97161 PT EVAL LOW COMPLEX 20 MIN: CPT | Performed by: PHYSICAL THERAPIST

## 2024-11-12 NOTE — PROGRESS NOTES
PT Evaluation     Today's date: 2024  Patient name: Mercedes Lynne  : 1995  MRN: [id number]  Referring provider: Tia Meza CR*  Dx:   Encounter Diagnosis     ICD-10-CM    1. Muscle tension pain  M79.10 Ambulatory Referral to Physical Therapy                     Assessment  Impairments: abnormal coordination, abnormal or restricted ROM, activity intolerance, impaired physical strength and pain with function    Assessment details: Pt is a pleasant 29 y.o. female presenting to outpatient physical therapy with Muscle tension pain  (primary encounter diagnosis), Chronic pain of left ankle. Pt presents with pain, decreased CS range of motion, decreased UE strength, and decreased tolerance to activity. Displays movement impairment diagnosis of left cervical accessory musculature hypomobility and postural dysfunction. Issued HEP and educated on postural corrections. In discussing postural corrections, patient realizes she sits at her desk laterally flexed at her trunk to the left, leaning on her left arm. Pt is a good candidate for outpatient physical therapy and would benefit from skilled physical therapy to address limitations and to achieve goals. Thank you for this referral.   Understanding of Dx/Px/POC: good     Prognosis: good    Goals  Short-Term Goals (4 weeks)   1. Patient will decrease worst rating of pain by 25% to improve quality of life.  2. Patient will increase strength by 1/2 MMT to improve quality of life with improved efficiency of daily activities.  3. Patient will improve ROM by 25% indicating improved mobility of affected area.    Long-Term Goals (8 weeks)   1. Patient will decrease pain by 50% at worst in comparison to IE indicating significant reduction in pain and improved quality of life.  2. Patient will demonstrate strength WFL compared to IE levels indicating ability to independently manage pain symptoms to accomplish daily activities.   3. Patient will be independent  with HEP with good form accomplished.      Plan  Patient would benefit from: PT eval and skilled PT  Planned modality interventions: cryotherapy and thermotherapy: hydrocollator packs    Planned therapy interventions: IADL retraining, body mechanics training, flexibility, functional ROM exercises, home exercise program, neuromuscular re-education, manual therapy, postural training, strengthening, stretching, therapeutic activities, therapeutic exercise, joint mobilization and IASTM    Frequency: 2x week  Duration in weeks: 6  Treatment plan discussed with: patient      Subjective Evaluation    History of Present Illness  Mechanism of injury: 24  Pt comes to therapy reporting 2-3 month history of left sided upper scapular/lateral neck pain of unknown etiology, denying trauma or injury. States she has consulted her PCP for this condition.     AGGS: sitting in forward posture with arms resting on desk, prolonged positioning. Denies notable pain or limitations with movements of her head, neck, shoulder regions.   LOC: superior aspect of left scapula, lateral cervical region; described as tight and sore. Denies radiating symptoms proximally to cranial region or distally down LUE. Denies paresthesias.   EASES: massage/massage gun to area  Denies use of medications or modalities.  GOALS: relieve discomfort to be able to tolerate prolonged sessions of crocheting and studying/school work.  Patient Goals  Patient goals for therapy: decreased pain    Pain  Current pain ratin  At worst pain ratin        Objective     Active Range of Motion   Cervical/Thoracic Spine       Cervical    Flexion:  WFL  Extension:  WFL  Left lateral flexion:  WFL  Right lateral flexion:  with pain Restriction level minimal  Left rotation:  with pain Restriction level: minimal  Right rotation:  WFL    Thoracic    Flexion:  WFL  Extension:  Restriction level: minimal  Left lateral flexion:  WFL  Right lateral flexion:  WFL  Left rotation:   WFL  Right rotation:  WFL    Strength/Myotome Testing     Left Shoulder     Planes of Motion   Flexion: 4+   Extension: 5   Abduction: 4   External rotation at 0°: 4   Internal rotation at 0°: 5     Right Shoulder     Planes of Motion   Flexion: 4+   Extension: 5   Abduction: 4+   External rotation at 0°: 4   Internal rotation at 0°: 5     Tests     Additional Tests Details  11/12/24  TTP in area of left UT, LS, scalenes    General Comments:    Upper quarter screen   Shoulder: unremarkable             Precautions:   Patient Active Problem List   Diagnosis    Screening for metabolic disorder    Eating disorder    Adjustment reaction with anxiety and depression    Sprain of tibiofibular ligament of left ankle    Vitamin D deficiency    Hyperhidrosis    Muscle tension pain    Annual physical exam      No Known Allergies    Daily Treatment Diary    Date 11/12/24             FOTO IE            Re-Eval IE            Auth visit # 1            Manuals    UT TPR/STM - left                                                    Neuro Re-Ed     Prone I, T                                                                                           Ther Ex    UT stretch demo            Scap depress demo            TS chair ext demo                         1/2 roll TS series             TB horiz abd                                       Ther Activity    UBE                          Gait Training                              Modalities                                  Access Code: [email or web address]  URL: [email or web address]  Date: 11/12/2024  Prepared by: Juan Luis Kang    Exercises  - Seated Upper Trapezius Stretch (Mirrored)  - 2 x daily - 30 reps - 3 hold  - Standing Scapular Depression  - 2 x daily - 2 sets - 10 reps - 5 hold  - Seated Thoracic Lumbar Extension  - 2 x daily - 15 reps - 5 hold  - Thoracic Extension Mobilization on Foam Roll  - 2 x daily - 15 reps - 5 hold  - Quadruped Full Range Thoracic Rotation with Reach  - 2  x daily - 15 reps - 5 hold

## 2024-11-19 ENCOUNTER — OFFICE VISIT (OUTPATIENT)
Dept: PHYSICAL THERAPY | Facility: REHABILITATION | Age: 29
End: 2024-11-19
Payer: COMMERCIAL

## 2024-11-19 DIAGNOSIS — M79.10 MUSCLE TENSION PAIN: Primary | ICD-10-CM

## 2024-11-19 DIAGNOSIS — M54.6 THORACIC SPINE PAIN: ICD-10-CM

## 2024-11-19 PROCEDURE — 97110 THERAPEUTIC EXERCISES: CPT | Performed by: PHYSICAL THERAPIST

## 2024-11-19 PROCEDURE — 97140 MANUAL THERAPY 1/> REGIONS: CPT | Performed by: PHYSICAL THERAPIST

## 2024-11-19 NOTE — PROGRESS NOTES
"Daily Note     Today's date: 2024  Patient name: Mercedes Lynne  : 1995  MRN: [id number]  Referring provider: Tia Meza CR*  Dx:   Encounter Diagnosis     ICD-10-CM    1. Muscle tension pain  M79.10       2. Thoracic spine pain  M54.6                      Subjective: Pt reports she feels the exercises have been helpful at reducing tension in her neck. States she continues to have notable difficulty with rotation to the left.       Objective: See treatment diary below      Assessment: Tolerated treatment well. Reported discomfort on ipsilateral sided neck pain with left rotation. Symptoms eased post-manuals. Advised patient in self-TPR techniques and SNAGS. Patient exhibited good technique with therapeutic exercises and would benefit from continued PT      Plan: Progress treatment as tolerated.       Precautions:   Patient Active Problem List   Diagnosis    Screening for metabolic disorder    Eating disorder    Adjustment reaction with anxiety and depression    Sprain of tibiofibular ligament of left ankle    Vitamin D deficiency    Hyperhidrosis    Muscle tension pain    Annual physical exam      No Known Allergies    Daily Treatment Diary    Date 24           FOTO IE            Re-Eval IE            Auth visit # 1            Manuals    UT TPR/STM - left  BUCK                                                  Neuro Re-Ed     Prone I, T  5\"x10           Supine chin tucks  5\"x10                                                                            Ther Ex    UT stretch demo            Scap depress demo            TS chair ext demo                         H/L 1/2 roll TS ext  5\"x10           1/2 roll TS series             TB horiz abd                                       Ther Activity    UBE  L1 3' ea                        Gait Training                              Modalities                                  Access Code: [email or web address]  URL: " [email or web address]  Date: 11/12/2024  Prepared by: Juan Luis Kang    Exercises  - Seated Upper Trapezius Stretch (Mirrored)  - 2 x daily - 30 reps - 3 hold  - Standing Scapular Depression  - 2 x daily - 2 sets - 10 reps - 5 hold  - Seated Thoracic Lumbar Extension  - 2 x daily - 15 reps - 5 hold  - Thoracic Extension Mobilization on Foam Roll  - 2 x daily - 15 reps - 5 hold  - Quadruped Full Range Thoracic Rotation with Reach  - 2 x daily - 15 reps - 5 hold

## 2024-11-22 ENCOUNTER — CLINICAL SUPPORT (OUTPATIENT)
Dept: INTERNAL MEDICINE CLINIC | Facility: OTHER | Age: 29
End: 2024-11-22
Payer: COMMERCIAL

## 2024-11-22 DIAGNOSIS — Z11.1 ENCOUNTER FOR PPD TEST: Primary | ICD-10-CM

## 2024-11-22 PROCEDURE — 86580 TB INTRADERMAL TEST: CPT

## 2024-11-29 ENCOUNTER — OFFICE VISIT (OUTPATIENT)
Dept: PHYSICAL THERAPY | Facility: REHABILITATION | Age: 29
End: 2024-11-29
Payer: COMMERCIAL

## 2024-11-29 DIAGNOSIS — G89.29 CHRONIC PAIN OF LEFT ANKLE: ICD-10-CM

## 2024-11-29 DIAGNOSIS — M79.10 MUSCLE TENSION PAIN: Primary | ICD-10-CM

## 2024-11-29 DIAGNOSIS — M25.572 CHRONIC PAIN OF LEFT ANKLE: ICD-10-CM

## 2024-11-29 DIAGNOSIS — M54.6 THORACIC SPINE PAIN: ICD-10-CM

## 2024-11-29 PROCEDURE — 97110 THERAPEUTIC EXERCISES: CPT | Performed by: PHYSICAL THERAPIST

## 2024-11-29 PROCEDURE — 97140 MANUAL THERAPY 1/> REGIONS: CPT | Performed by: PHYSICAL THERAPIST

## 2024-11-29 NOTE — PROGRESS NOTES
"Daily Note     Today's date: 2024  Patient name: Mercedes Lynne  : 1995  MRN: [id number]  Referring provider: Tia Meza CR*  Dx:   Encounter Diagnosis     ICD-10-CM    1. Muscle tension pain  M79.10       2. Thoracic spine pain  M54.6       3. Chronic pain of left ankle  M25.572     G89.29                      Subjective: Pt comes to therapy denying soreness or pain this morning, however, has had some tightness in the left upper trap lately. States she feels she is close to achieving her goals for PT at this time.       Objective: See treatment diary below      Assessment: Tolerated treatment well. Patient exhibited good technique with therapeutic exercises      Plan: discussed management of symptoms via HEP. Patient demonstrated understanding. Will place therapy on hold at present time and will follow up in future if needed, but has achieved goals at present time.      Precautions:   Patient Active Problem List   Diagnosis    Screening for metabolic disorder    Eating disorder    Adjustment reaction with anxiety and depression    Sprain of tibiofibular ligament of left ankle    Vitamin D deficiency    Hyperhidrosis    Muscle tension pain    Annual physical exam      No Known Allergies    Daily Treatment Diary    Date 24          FOTO IE  perf          Re-Eval IE            Auth visit # 1            Manuals    UT TPR/STM - left  BUCK BUCK                                                 Neuro Re-Ed     Prone I, T  5\"x10 5\"x10 ea          Supine chin tucks  5\"x10 5\"x15                                                                           Ther Ex    UT stretch demo            Scap depress demo            TS chair ext demo                         H/L 1/2 roll TS ext  5\"x10 5\"x10          1/2 roll TS series             TB horiz abd                                       Ther Activity    UBE  L1 3' ea L1 x3'                       Gait Training                            "   Modalities                                  Access Code: [email or web address]  URL: [email or web address]  Date: 11/12/2024  Prepared by: Juan Luis Kang    Exercises  - Seated Upper Trapezius Stretch (Mirrored)  - 2 x daily - 30 reps - 3 hold  - Standing Scapular Depression  - 2 x daily - 2 sets - 10 reps - 5 hold  - Seated Thoracic Lumbar Extension  - 2 x daily - 15 reps - 5 hold  - Thoracic Extension Mobilization on Foam Roll  - 2 x daily - 15 reps - 5 hold  - Quadruped Full Range Thoracic Rotation with Reach  - 2 x daily - 15 reps - 5 hold

## 2024-12-20 ENCOUNTER — OFFICE VISIT (OUTPATIENT)
Age: 29
End: 2024-12-20
Payer: COMMERCIAL

## 2024-12-20 VITALS
WEIGHT: 186 LBS | BODY MASS INDEX: 29.89 KG/M2 | HEIGHT: 66 IN | SYSTOLIC BLOOD PRESSURE: 104 MMHG | TEMPERATURE: 99.2 F | HEART RATE: 74 BPM | OXYGEN SATURATION: 98 % | DIASTOLIC BLOOD PRESSURE: 68 MMHG

## 2024-12-20 DIAGNOSIS — B35.4 TINEA CORPORIS: Primary | ICD-10-CM

## 2024-12-20 PROCEDURE — 99213 OFFICE O/P EST LOW 20 MIN: CPT | Performed by: INTERNAL MEDICINE

## 2024-12-20 RX ORDER — KETOCONAZOLE 20 MG/G
CREAM TOPICAL 2 TIMES DAILY
Qty: 15 G | Refills: 1 | Status: SHIPPED | OUTPATIENT
Start: 2024-12-20

## 2024-12-20 NOTE — ASSESSMENT & PLAN NOTE
Given rx for ketoconazole cream to be applied twice daily which can be combined with OTC hydrocortisone.  Also encouraged the use of a dandruff shampoo for mild scalp seborrhea    Orders:    ketoconazole (NIZORAL) 2 % cream; Apply topically 2 (two) times a day

## 2024-12-20 NOTE — PROGRESS NOTES
"Name: Mercedes Lynne      : 1995      MRN: [id number]  Encounter Provider: Hema Ornelas MD  Encounter Date: 2024   Encounter department: [facility]  :  Assessment & Plan  Tinea corporis  Given rx for ketoconazole cream to be applied twice daily which can be combined with OTC hydrocortisone.  Also encouraged the use of a dandruff shampoo for mild scalp seborrhea    Orders:    ketoconazole (NIZORAL) 2 % cream; Apply topically 2 (two) times a day           History of Present Illness     The patient presents to the office with 2 small pink patches on her right and left calf that are mildly pruritic, somewhat scaly and slightly raised.  She is also concerned of a possible lesion on her scalp.      Review of Systems   Constitutional: Negative.  Negative for activity change, appetite change, chills, diaphoresis, fatigue, fever and unexpected weight change.   HENT: Negative.     Eyes: Negative.    Respiratory: Negative.     Cardiovascular: Negative.    Gastrointestinal: Negative.    Endocrine: Negative.    Genitourinary: Negative.    Musculoskeletal: Negative.    Skin:  Positive for rash (Pruritic pink slightly scaly patches on both calfs).   Neurological: Negative.    Hematological: Negative.    Psychiatric/Behavioral:  The patient is not nervous/anxious.        Objective   /68 (BP Location: Left arm, Patient Position: Sitting, Cuff Size: Standard)   Pulse 74   Temp 99.2 °F (37.3 °C) (Temporal)   Ht 5' 5.6\" (1.666 m)   Wt 84.4 kg (186 lb)   SpO2 98%   BMI 30.39 kg/m²      Physical Exam  Vitals reviewed.   Constitutional:       General: She is not in acute distress.     Appearance: Normal appearance. She is not ill-appearing, toxic-appearing or diaphoretic.   HENT:      Head: Normocephalic and atraumatic.      Right Ear: External ear normal.      Left Ear: External ear normal.      Nose: Nose normal.      Mouth/Throat:      Mouth: Mucous membranes " are moist.   Eyes:      General: No scleral icterus.     Conjunctiva/sclera: Conjunctivae normal.      Pupils: Pupils are equal, round, and reactive to light.   Neck:      Vascular: No JVD.      Trachea: No tracheal deviation.   Cardiovascular:      Rate and Rhythm: Normal rate.   Pulmonary:      Effort: Pulmonary effort is normal. No respiratory distress.   Abdominal:      General: Abdomen is flat. There is no distension.   Musculoskeletal:      Right lower leg: No edema.      Left lower leg: No edema.   Skin:     General: Skin is warm.      Coloration: Skin is not jaundiced.      Findings: Rash (2 small oval pink, slightly scaly and mildly pruritic patches on both calves) present. No bruising or erythema.      Comments: Mild scaling of the scalp but no definitive skin lesions noted   Neurological:      General: No focal deficit present.      Mental Status: She is alert and oriented to person, place, and time. Mental status is at baseline.   Psychiatric:         Mood and Affect: Mood normal.         Behavior: Behavior normal.

## 2025-01-09 ENCOUNTER — OFFICE VISIT (OUTPATIENT)
Dept: UROLOGY | Facility: CLINIC | Age: 30
End: 2025-01-09
Payer: COMMERCIAL

## 2025-01-09 VITALS
HEART RATE: 71 BPM | SYSTOLIC BLOOD PRESSURE: 108 MMHG | WEIGHT: 190 LBS | DIASTOLIC BLOOD PRESSURE: 70 MMHG | BODY MASS INDEX: 30.53 KG/M2 | HEIGHT: 66 IN | OXYGEN SATURATION: 97 %

## 2025-01-09 DIAGNOSIS — N39.0 FREQUENT UTI: Primary | ICD-10-CM

## 2025-01-09 DIAGNOSIS — R39.15 URINARY URGENCY: ICD-10-CM

## 2025-01-09 PROCEDURE — 99213 OFFICE O/P EST LOW 20 MIN: CPT | Performed by: PHYSICIAN ASSISTANT

## 2025-03-06 ENCOUNTER — TELEPHONE (OUTPATIENT)
Age: 30
End: 2025-03-06

## 2025-03-06 NOTE — TELEPHONE ENCOUNTER
Attempted to reach pt regarding an appointment for 5/8/2025 with Jaqueline Bergman on the reschedule list, phone number is not in service. Reschedule notice is needed advising of the new appointment details for 5/9/2025 at 1:20pm.

## 2025-03-07 ENCOUNTER — VBI (OUTPATIENT)
Dept: ADMINISTRATIVE | Facility: OTHER | Age: 30
End: 2025-03-07

## 2025-03-07 NOTE — TELEPHONE ENCOUNTER
03/07/25 5:09 PM     Chart reviewed for Pap Smear (HPV) aka Cervical Cancer Screening was/were submitted to the patient's insurance.     Christy Vaughn   [facility]   Name band;

## 2025-03-27 ENCOUNTER — EVALUATION (OUTPATIENT)
Age: 30
End: 2025-03-27
Payer: COMMERCIAL

## 2025-03-27 DIAGNOSIS — R39.15 URINARY URGENCY: ICD-10-CM

## 2025-03-27 PROCEDURE — 97110 THERAPEUTIC EXERCISES: CPT | Performed by: PHYSICAL THERAPIST

## 2025-03-27 PROCEDURE — 97162 PT EVAL MOD COMPLEX 30 MIN: CPT | Performed by: PHYSICAL THERAPIST

## 2025-03-27 NOTE — PROGRESS NOTES
PT Evaluation     Today's date: 3/27/2025  Patient name: Mercedes Lynne  : 1995  MRN: [id number]  Referring provider: Jaqueline Bergman PA-C  Dx:   Encounter Diagnosis     ICD-10-CM    1. Urinary urgency  R39.15 Ambulatory Referral to Physical Therapy                     Assessment  Impairments: abnormal coordination, abnormal muscle firing, abnormal muscle tone, impaired physical strength and lacks appropriate home exercise program    Assessment details: Mercedes Lynne is a 29 y.o. female who presents to physical therapy with Urinary urgency. Internal assessment will be performed at [facility] due to time constraint during IE. Functional impairments include poor ability to delay urinary urge, leakage with sit to stand, and pain in L lower abdominals. Physical findings include deficits in L hip strength, pain in L hip, and discomfort over L lower abdominals. Mercedes Lynne would benefit from formal physical therapy to address impairments as detailed, decrease pain, and restore maximal level of function for all home, self-care, health maintenance, and mobility tasks. Thank you for this referral.    Understanding of Dx/Px/POC: good     Prognosis: good    Goals  Short term goals (to be met in 4 weeks):  1. Pt will improve hip strength by at least 1/2 grade.  2. Pt will decrease urgency to make it to the bathroom in time.    Long term goals (to be met by discharge):  1. Pt will be compliant with HEP.  2. Pt will demonstrate at least 2 urge delay techniques.  3. Pt will demonstrate 5/5 hip strength.  4. Pt will report resolution of leakage at least 90% of the time.      Plan  Patient would benefit from: PT eval and skilled physical therapy  Planned modality interventions: biofeedback    Planned therapy interventions: abdominal trunk stabilization, activity modification, joint mobilization, manual therapy, massage, Amaral taping, neuromuscular re-education, patient education, postural training,  strengthening, stretching, therapeutic activities, therapeutic exercise, behavior modification, body mechanics training, breathing training, home exercise program, IASTM and kinesiology taping    Frequency: Every other week  Duration in weeks: 12  Treatment plan discussed with: patient        PT Pelvic Floor Subjective:   History of Present Illness:   Mercedes states that she was bedridden after her hernia surgery. She started having urgency but was able to get to the bathroom in time. She now has more extreme urgency. She also had a cyst on urachal stalk, was removed. Sometimes she will leak out a little urine, sometimes can't get her pants down in time. She notes some achiness/soreness in L lower abdominals. She feels some relief after being able to urinate. She has not had any recent UTIs in the last year.  Social Support:     Lives in:  Apartment    Lives with:  Parents    Relationship status: never     Work status: unemployed  Diet and Exercise:    Diet:balanced nutrition    Exercise type: yoga and walking    Hiking  This issue has interfered with some of her activities  OB/ gyn History    Gestational History:     Prior Pregnancy: No      Menstrual History:      Birth control: contraception    Birth control method: IUD  Bladder Function:     Voiding Difficulties positive for: urgency      Voiding Difficulties negative for: frequent urination, hesitancy, straining and incomplete emptying       Voiding Difficulties comments:     Voiding frequency: every 3-4 hours    Urinary leakage: urine leakage    Urinary leakage aggravated by: walking to the bathroom and seeing a toilet    Urinary leakage not aggravated by: post-void dribble    Nocturia (episodes per night): 2 and 3 (urgency worse at night)    Fluid Intake Type:  Water, tea and coffee  Incontinence Management:     Pads/Diaper Use:  Day    Pads/Diapers Additional Comments: liners    Patient has attempted at least 4 weeks of independent pelvic floor  strengthening exercises without a resolution of symptoms  Bowel Function:     Bowel frequency: daily  Sexual Function:     Sexually Active:  Sexually active    Pain during intercourse: No    Pain:     At worst pain ratin    Location:  L lower abdominals    Quality: discomfort, soreness.    Exacerbated by: holding urine for too long.    Duration of symptoms:  Brief    Relieving factors:  Emptying bladder    Progression:  Worsening  Diagnostic Tests:     None    Treatments:     Previous treatment:  Physical therapy  Upcoming doctor's appointment: yes  Date of next appointment: 2025  Patient Goals:     Patient goals for therapy:  Improved quality of life and improved bladder or bowel function    Other patient goals:  Not feel like I'm going to pee myself, go back to acknowledging the urge and being able to get to the bathroom without worrying about it      Objective     Static Posture     Comments  Tender over L lower abdominals  Pronation R > L  Sprained L ankle last February    Active Range of Motion     Lumbar   Flexion:  WFL  Extension:  WFL  Left lateral flexion:  WFL  Right lateral flexion:  WFL  Left rotation:  WFL  Right rotation:  WFL    Strength/Myotome Testing     Left Hip   Planes of Motion   Flexion: 5 (tightness in lateral hip)  Abduction: 4  External rotation: 5  Internal rotation: 5 (pinchy in lateral hip)    Right Hip   Planes of Motion   Flexion: 5  Abduction: 4+  External rotation: 5  Internal rotation: 5    Tests     Left Hip   Positive FADIR (pinching in lateral hip).   Negative PATRICIA and Gillet's.   SLR: Negative.     Right Hip   Negative PATRICIA, FADIR and Gillet's.   SLR: Negative.       Abdominal Assessment:      Position: supine exam  Abdominal Assessment: Tender over R UQ (felt in L LQ) and over L LQ  Hernia incisions well healed    Diastatis   Diastasis recti present? no  Connective tissue integrity at linea alba: firm  no tenderness at linea alba  able to engage transverse abdominis   "                Precautions:   Patient Active Problem List   Diagnosis    Screening for metabolic disorder    Eating disorder    Adjustment reaction with anxiety and depression    Sprain of tibiofibular ligament of left ankle    Vitamin D deficiency    Hyperhidrosis    Muscle tension pain    Annual physical exam    Tinea corporis           Manuals 3/27            Internal assess nv                                                   Neuro Re-Ed                                                                                                        Ther Ex             TA + PF iso 3\"x5            TA + hip abduction L, 10x                                                                                          Ther Activity             PF + STS  reviewed            Urge delay 5x HR reviewed            Urge delay FBS reviewed            Gait Training                                       Modalities                                            "

## 2025-05-14 ENCOUNTER — OFFICE VISIT (OUTPATIENT)
Dept: INTERNAL MEDICINE CLINIC | Facility: OTHER | Age: 30
End: 2025-05-14
Payer: COMMERCIAL

## 2025-05-14 VITALS
BODY MASS INDEX: 35.49 KG/M2 | OXYGEN SATURATION: 99 % | SYSTOLIC BLOOD PRESSURE: 126 MMHG | HEART RATE: 80 BPM | TEMPERATURE: 98 F | DIASTOLIC BLOOD PRESSURE: 84 MMHG | WEIGHT: 213 LBS | HEIGHT: 65 IN

## 2025-05-14 DIAGNOSIS — Q89.9 UMBILICAL ABNORMALITY: ICD-10-CM

## 2025-05-14 DIAGNOSIS — F50.9 EATING DISORDER, UNSPECIFIED TYPE: ICD-10-CM

## 2025-05-14 DIAGNOSIS — E55.9 VITAMIN D DEFICIENCY: ICD-10-CM

## 2025-05-14 DIAGNOSIS — F43.23 ADJUSTMENT REACTION WITH ANXIETY AND DEPRESSION: ICD-10-CM

## 2025-05-14 DIAGNOSIS — E04.9 ENLARGED THYROID: Primary | ICD-10-CM

## 2025-05-14 PROCEDURE — 87076 CULTURE ANAEROBE IDENT EACH: CPT | Performed by: NURSE PRACTITIONER

## 2025-05-14 PROCEDURE — 87185 SC STD ENZYME DETCJ PER NZM: CPT | Performed by: NURSE PRACTITIONER

## 2025-05-14 PROCEDURE — 87205 SMEAR GRAM STAIN: CPT | Performed by: NURSE PRACTITIONER

## 2025-05-14 PROCEDURE — 99214 OFFICE O/P EST MOD 30 MIN: CPT | Performed by: NURSE PRACTITIONER

## 2025-05-14 PROCEDURE — 87070 CULTURE OTHR SPECIMN AEROBIC: CPT | Performed by: NURSE PRACTITIONER

## 2025-05-14 PROCEDURE — 87077 CULTURE AEROBIC IDENTIFY: CPT | Performed by: NURSE PRACTITIONER

## 2025-05-14 RX ORDER — HYDROXYZINE HYDROCHLORIDE 25 MG/1
25 TABLET, FILM COATED ORAL EVERY 6 HOURS PRN
Qty: 30 TABLET | Refills: 1 | Status: SHIPPED | OUTPATIENT
Start: 2025-05-14

## 2025-05-14 RX ORDER — ERGOCALCIFEROL 1.25 MG/1
50000 CAPSULE ORAL WEEKLY
Qty: 12 CAPSULE | Refills: 0 | Status: SHIPPED | OUTPATIENT
Start: 2025-05-14

## 2025-05-14 RX ORDER — FLUOXETINE 10 MG/1
30 CAPSULE ORAL DAILY
Qty: 270 CAPSULE | Refills: 1 | Status: SHIPPED | OUTPATIENT
Start: 2025-05-14 | End: 2025-08-12

## 2025-05-14 NOTE — ASSESSMENT & PLAN NOTE
-Encouraged to eat well-balanced diet, avoid binging and purging  -Referral to nutritionist

## 2025-05-14 NOTE — ASSESSMENT & PLAN NOTE
- Continue current dose of Prozac and as needed Atarax  -Follow-up in 6 months or sooner if needed  Orders:    FLUoxetine (PROzac) 10 mg capsule; Take 3 capsules (30 mg total) by mouth daily    hydrOXYzine HCL (ATARAX) 25 mg tablet; Take 1 tablet (25 mg total) by mouth every 6 (six) hours as needed for anxiety

## 2025-05-14 NOTE — ASSESSMENT & PLAN NOTE
- Restart vitamin D supplementation  Orders:    ergocalciferol (ERGOCALCIFEROL) 1.25 MG (78285 UT) capsule; Take 1 capsule (50,000 Units total) by mouth once a week

## 2025-05-14 NOTE — PROGRESS NOTES
Name: Mercedes Lynne      : 1995      MRN: [id number]  Encounter Provider: EVIN Rob  Encounter Date: 2025   Encounter department: [facility]  :  Assessment & Plan  Adjustment reaction with anxiety and depression  - Continue current dose of Prozac and as needed Atarax  -Follow-up in 6 months or sooner if needed  Orders:    FLUoxetine (PROzac) 10 mg capsule; Take 3 capsules (30 mg total) by mouth daily    hydrOXYzine HCL (ATARAX) 25 mg tablet; Take 1 tablet (25 mg total) by mouth every 6 (six) hours as needed for anxiety    Vitamin D deficiency  - Restart vitamin D supplementation  Orders:    ergocalciferol (ERGOCALCIFEROL) 1.25 MG (19832 UT) capsule; Take 1 capsule (50,000 Units total) by mouth once a week    Enlarged thyroid  - Lymphadenopathy noted around thyroid, will get ultrasound  Orders:    US head neck soft tissue; Future    Umbilical abnormality  - Will send wound culture  -Advised to keep clean and dry    Orders:    Wound culture and Gram stain; Future    Eating disorder, unspecified type  -Encouraged to eat well-balanced diet, avoid binging and purging  -Referral to nutritionist                  History of Present Illness   Mercedes seen in office today for 6 month follow up following increase in prozac dose.   Adjustment reaction with anxiety and depression  -Increase dose of Prozac to 30mg and as needed Atarax  -Advised to not abruptly stop this medication    She feels the medication has been helping and mood have been stable.     Says she is currently in therapy due to bladder issues, she feels it is helping and has a follow up end of the month with urology.     She reports the muscle tension is no longer an issue and has resolved.     She denies SOB, chest pain or palpatations.    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     States she does get a sensation that something is  "stuck in the back of her throat. States it occurs randomly. She denies sensation at this time.     She reports irritation to her belly button, some redness and drainage, no pain, no fevers a chills       Review of Systems   Constitutional:  Negative for activity change, appetite change, chills, diaphoresis and fever.   HENT:  Negative for congestion, ear discharge, ear pain, postnasal drip, rhinorrhea, sinus pressure, sinus pain and sore throat.    Eyes:  Negative for pain, discharge, itching and visual disturbance.   Respiratory:  Negative for cough, chest tightness, shortness of breath and wheezing.    Cardiovascular:  Negative for chest pain, palpitations and leg swelling.   Gastrointestinal:  Negative for abdominal pain, constipation, diarrhea, nausea and vomiting.   Endocrine: Negative for polydipsia, polyphagia and polyuria.   Genitourinary:  Negative for difficulty urinating, dysuria and urgency.   Musculoskeletal:  Negative for arthralgias, back pain and neck pain.   Skin:  Negative for rash and wound.   Neurological:  Negative for dizziness, weakness, numbness and headaches.   Psychiatric/Behavioral:  Negative for dysphoric mood. The patient is not nervous/anxious.        Objective   /84 (BP Location: Left arm, Patient Position: Sitting, Cuff Size: Adult)   Pulse 80   Temp 98 °F (36.7 °C)   Ht 5' 5\" (1.651 m)   Wt 96.6 kg (213 lb)   SpO2 99%   BMI 35.45 kg/m²      Physical Exam  Constitutional:       General: She is not in acute distress.     Appearance: She is well-developed. She is not diaphoretic.   HENT:      Head: Normocephalic and atraumatic.        Right Ear: External ear normal.      Left Ear: External ear normal.      Nose: Nose normal.      Mouth/Throat:      Mouth: Mucous membranes are moist.      Pharynx: No oropharyngeal exudate or posterior oropharyngeal erythema.     Eyes:      General:         Right eye: No discharge.         Left eye: No discharge.      Conjunctiva/sclera: " Conjunctivae normal.      Pupils: Pupils are equal, round, and reactive to light.     Neck:      Thyroid: No thyromegaly.     Cardiovascular:      Rate and Rhythm: Normal rate and regular rhythm.      Heart sounds: Normal heart sounds. No murmur heard.     No friction rub. No gallop.   Pulmonary:      Effort: Pulmonary effort is normal. No respiratory distress.      Breath sounds: Normal breath sounds. No stridor. No wheezing or rales.   Abdominal:      General: Bowel sounds are normal. There is no distension.      Palpations: Abdomen is soft.      Tenderness: There is no abdominal tenderness.     Musculoskeletal:      Cervical back: Normal range of motion and neck supple.   Lymphadenopathy:      Cervical: No cervical adenopathy.     Skin:     General: Skin is warm and dry.      Findings: No erythema or rash.     Neurological:      Mental Status: She is alert and oriented to person, place, and time.     Psychiatric:         Behavior: Behavior normal.         Thought Content: Thought content normal.         Judgment: Judgment normal.

## 2025-05-14 NOTE — ASSESSMENT & PLAN NOTE
- Lymphadenopathy noted around thyroid, will get ultrasound  Orders:    US head neck soft tissue; Future

## 2025-05-14 NOTE — ASSESSMENT & PLAN NOTE
- Will send wound culture  -Advised to keep clean and dry    Orders:    Wound culture and Gram stain; Future

## 2025-05-17 LAB
BACTERIA WND AEROBE CULT: ABNORMAL
BACTERIA WND AEROBE CULT: ABNORMAL
GRAM STN SPEC: ABNORMAL

## 2025-05-19 ENCOUNTER — RESULTS FOLLOW-UP (OUTPATIENT)
Age: 30
End: 2025-05-19

## 2025-05-19 DIAGNOSIS — Q89.9 UMBILICAL ABNORMALITY: Primary | ICD-10-CM

## 2025-05-19 LAB
BACTERIA WND AEROBE CULT: ABNORMAL
GRAM STN SPEC: ABNORMAL

## 2025-05-19 NOTE — TELEPHONE ENCOUNTER
----- Message from EVIN Galvez sent at 5/19/2025 12:42 PM EDT -----  Please call and notify patient that her wound culture was positive and we will be treating with Augmentin.  Also I would like her to cleanse her bellybutton with antiseptic cleansing solution which   she can get at the pharmacy.  ----- Message -----  From: Lab, Background User  Sent: 5/15/2025   1:48 PM EDT  To: EVIN Rob

## 2025-06-03 ENCOUNTER — OFFICE VISIT (OUTPATIENT)
Age: 30
End: 2025-06-03
Payer: COMMERCIAL

## 2025-06-03 VITALS
HEIGHT: 65 IN | SYSTOLIC BLOOD PRESSURE: 120 MMHG | DIASTOLIC BLOOD PRESSURE: 70 MMHG | HEART RATE: 93 BPM | OXYGEN SATURATION: 98 % | BODY MASS INDEX: 35.79 KG/M2 | TEMPERATURE: 98.3 F | WEIGHT: 214.8 LBS

## 2025-06-03 DIAGNOSIS — M54.42 ACUTE LEFT-SIDED LOW BACK PAIN WITH LEFT-SIDED SCIATICA: Primary | ICD-10-CM

## 2025-06-03 PROCEDURE — 99213 OFFICE O/P EST LOW 20 MIN: CPT | Performed by: NURSE PRACTITIONER

## 2025-06-03 RX ORDER — MELOXICAM 15 MG/1
15 TABLET ORAL DAILY
Qty: 30 TABLET | Refills: 0 | Status: SHIPPED | OUTPATIENT
Start: 2025-06-03

## 2025-06-03 NOTE — ASSESSMENT & PLAN NOTE
- Recommend gentle stretching  -Start Mobic advised to take with food  -Encouraged ice or heat for discomfort  -Referral to PT  Orders:    Ambulatory Referral to Physical Therapy; Future    meloxicam (Mobic) 15 mg tablet; Take 1 tablet (15 mg total) by mouth daily

## 2025-06-03 NOTE — PROGRESS NOTES
Name: Mercedes Lynne      : 1995      MRN: [id number]  Encounter Provider: EVIN Rob  Encounter Date: 6/3/2025   Encounter department: [facility]  :  Assessment & Plan  Acute left-sided low back pain with left-sided sciatica  - Recommend gentle stretching  -Start Mobic advised to take with food  -Encouraged ice or heat for discomfort  -Referral to PT  Orders:    Ambulatory Referral to Physical Therapy; Future    meloxicam (Mobic) 15 mg tablet; Take 1 tablet (15 mg total) by mouth daily           History of Present Illness   Patient presents with left hip, knee, ankle pain. Says the pain does go across the back at times.     Reports it began when she sprained her ankle in .     Xray in 2/3/2024 of left ankle   IMPRESSION:     No acute displaced fracture    Reports the pain in the hip starts in the left buttocks and goes down the leg. Reports numbness at times in the hip and tingling.     Denies recent falls or injuries.     Reports she has not taken any medications for it.   Says it is uncomfortable if she walks for long periods of time. Denies difficulty with bearing weight. Reports she has not had symptoms like this is the past other than with her ankle and the past sprain.           Review of Systems   Constitutional:  Negative for activity change, appetite change, chills, diaphoresis and fever.   HENT:  Negative for congestion, ear discharge, ear pain, postnasal drip, rhinorrhea, sinus pressure, sinus pain and sore throat.    Eyes:  Negative for pain, discharge, itching and visual disturbance.   Respiratory:  Negative for cough, chest tightness, shortness of breath and wheezing.    Cardiovascular:  Negative for chest pain, palpitations and leg swelling.   Gastrointestinal:  Negative for abdominal pain, constipation, diarrhea, nausea and vomiting.   Endocrine: Negative for polydipsia, polyphagia and polyuria.   Genitourinary:  Negative for  "difficulty urinating, dysuria and urgency.   Musculoskeletal:  Positive for back pain. Negative for arthralgias and neck pain.   Skin:  Negative for rash and wound.   Neurological:  Negative for dizziness, weakness, numbness and headaches.       Objective   /70 (BP Location: Left arm, Patient Position: Sitting, Cuff Size: Standard)   Pulse 93   Temp 98.3 °F (36.8 °C) (Temporal)   Ht 5' 5\" (1.651 m)   Wt 97.4 kg (214 lb 12.8 oz)   SpO2 98%   BMI 35.74 kg/m²      Physical Exam  Constitutional:       General: She is not in acute distress.     Appearance: She is well-developed. She is not diaphoretic.   HENT:      Head: Normocephalic and atraumatic.      Right Ear: External ear normal.      Left Ear: External ear normal.      Nose: Nose normal.      Mouth/Throat:      Mouth: Mucous membranes are moist.      Pharynx: No oropharyngeal exudate or posterior oropharyngeal erythema.     Eyes:      General:         Right eye: No discharge.         Left eye: No discharge.      Conjunctiva/sclera: Conjunctivae normal.      Pupils: Pupils are equal, round, and reactive to light.     Neck:      Thyroid: No thyromegaly.     Cardiovascular:      Rate and Rhythm: Normal rate and regular rhythm.      Heart sounds: Normal heart sounds. No murmur heard.     No friction rub. No gallop.   Pulmonary:      Effort: Pulmonary effort is normal. No respiratory distress.      Breath sounds: Normal breath sounds. No stridor. No wheezing or rales.   Abdominal:      General: Bowel sounds are normal. There is no distension.      Palpations: Abdomen is soft.      Tenderness: There is no abdominal tenderness.     Musculoskeletal:      Cervical back: Normal range of motion and neck supple.      Comments: Positive straight leg test normal   Lymphadenopathy:      Cervical: No cervical adenopathy.     Skin:     General: Skin is warm and dry.      Findings: No erythema or rash.     Neurological:      Mental Status: She is alert and oriented to " person, place, and time.     Psychiatric:         Behavior: Behavior normal.         Thought Content: Thought content normal.         Judgment: Judgment normal.

## 2025-06-10 ENCOUNTER — HOSPITAL ENCOUNTER (OUTPATIENT)
Dept: RADIOLOGY | Facility: IMAGING CENTER | Age: 30
Discharge: HOME | End: 2025-06-10
Attending: NURSE PRACTITIONER
Payer: COMMERCIAL

## 2025-06-10 DIAGNOSIS — E04.9 ENLARGED THYROID: ICD-10-CM

## 2025-06-10 PROCEDURE — 76536 US EXAM OF HEAD AND NECK: CPT

## 2025-06-18 ENCOUNTER — EVALUATION (OUTPATIENT)
Dept: PHYSICAL THERAPY | Facility: REHABILITATION | Age: 30
End: 2025-06-18
Attending: NURSE PRACTITIONER
Payer: COMMERCIAL

## 2025-06-18 ENCOUNTER — RESULTS FOLLOW-UP (OUTPATIENT)
Dept: INTERNAL MEDICINE CLINIC | Facility: OTHER | Age: 30
End: 2025-06-18

## 2025-06-18 DIAGNOSIS — M54.42 ACUTE LEFT-SIDED LOW BACK PAIN WITH LEFT-SIDED SCIATICA: ICD-10-CM

## 2025-06-18 PROCEDURE — 97535 SELF CARE MNGMENT TRAINING: CPT | Performed by: PHYSICAL THERAPIST

## 2025-06-18 PROCEDURE — 97161 PT EVAL LOW COMPLEX 20 MIN: CPT | Performed by: PHYSICAL THERAPIST

## 2025-06-18 PROCEDURE — 97110 THERAPEUTIC EXERCISES: CPT | Performed by: PHYSICAL THERAPIST

## 2025-06-18 NOTE — PROGRESS NOTES
PT Evaluation     Today's date: 2025  Patient name: Mercedes Lynne  : 1995  MRN: [id number]  Referring provider: Tia Meza CR*  Dx:   Encounter Diagnosis     ICD-10-CM    1. Acute left-sided low back pain with left-sided sciatica  M54.42 Ambulatory Referral to Physical Therapy          Start Time: 1345  Stop Time: 1430  Total time in clinic (min): 45 minutes    Assessment  Impairments: abnormal or restricted ROM, activity intolerance, lacks appropriate home exercise program, pain with function and activity limitations  Symptom irritability: low    Assessment details: Mercedes Lynne presents to outpatient physical with complaints of chronic low back pain. Pt presents with painful lumbar ROM and limited lumbar extension ROM, L>R proximal hip weakness, increased neural tension of R >L sciatic nerve, (-) SIJ testing, negative all neurological and red flag screening leading to activity intolerance.     Based on patient's age and motivation, patient is a positive candidate to respond favorably to physical therapy with a good prognosis. Patient was educated on examination and techniques, plan of care, goals of therapy, and HEP. Patient was provided an opportunity to ask any questions. Provided Pt initial HEP. Patient demonstrated and verbalized understanding. Verbally reported to hold exercises if increased pain or discomfort. Pt will be seen 1-2x/week for 6-12 weeks. Patient will be re-assessed regularly in order to document progress and limit any regression. Thank you for this referral.    Understanding of Dx/Px/POC: good     Prognosis: good    Goals  ST. Pt will improve lumbar ext ROM by 25% in 4 weeks  2. Pt will improve L proximal hip MMT by 1/2 grade in 4 weeks  3. Pt will be able to ambulate 30 min with pain at worst of 3/10 in 4 weeks  4. Pt will be independent with initial HEP by discharge      LT. Pt will be independent with all ADLs/IADLs with pain at worst of 1-2/10  2.  Pt will be able to complete all recreational and leisure activities with pain at worst of 1-2/10 by discharge    Plan  Patient would benefit from: PT eval and skilled physical therapy  Planned modality interventions: cryotherapy and thermotherapy: hydrocollator packs    Planned therapy interventions: joint mobilization, IASTM, manual therapy, nerve gliding, neuromuscular re-education, patient/caregiver education, postural training, self care, stretching, strengthening, therapeutic activities, therapeutic exercise, home exercise program, graded exercise, graded activity, functional ROM exercises, flexibility, abdominal trunk stabilization and activity modification    Frequency: 2x week  Duration in weeks: 6  Treatment plan discussed with: patient        Subjective Evaluation    History of Present Illness  Mechanism of injury: Mercedes Lynne is a 29 y.o. female that presents to outpatient physical therapy stating that when she was 14 she fell and started to feel the LBP however, since then, she has noticed that is has worsened over time. Pt states that the pain and discomfort is always on the L side starting at the low back and lateral hip.     Pt states that her low back is generally a 5/10 at rest but can worsen with activities such as climbing stairs, walking, and prolonged sitting. Pain can reach an 8/10. States that resting as well as child's pose can improve pain minimally but it is constantly around that 5/10. Numbness and tingling is said to normally be in the anterior L thigh but it can radiate to the ankle. When sleeping on the L side she says that she wakes up with the same numbness. Pt describes the pain in the lateral/posterior hip as a pinching feeling. States that hip feels like it is unstable when walking. Pt's goal for physical therapy is to decrease pain and to feel more stable with walking.     Pt denies any severe night pain, B/B changes, chest pain, SOB, or saddle region paresthesia.  Pt  however did report episodes of excessive night sweats but she stated this may be due to anxiety diagnosis.               Recurrent probem    Patient Goals  Patient goals for therapy: decreased pain, increased motion, independence with ADLs/IADLs, increased strength and return to sport/leisure activities    Pain  Current pain ratin  At worst pain ratin  Location: lumbar spine/L LE  Quality: radiating  Relieving factors: relaxation  Aggravating factors: sitting, stair climbing and walking  Progression: worsening      Diagnostic Tests  No diagnostic tests performed        Objective     Concurrent Complaints  Positive for history of trauma (fall when she was 15yo). Negative for night pain, disturbed sleep, bladder dysfunction, bowel dysfunction, saddle (S4) numbness, cardiac problem, history of cancer and infection    Neurological Testing     Sensation     Lumbar   Left   Hyposensation: light touch    Right   Intact: light touch    Comments   Left light touch: L5    Reflexes   Left   Patellar (L4): normal (2+)  Achilles (S1): normal (2+)    Right   Patellar (L4): normal (2+)  Achilles (S1): normal (2+)    Active Range of Motion     Lumbar   Flexion:  WFL  Extension:  with pain Restriction level: minimal  Left lateral flexion: Active left lumbar lateral flexion: FF.    WFL  Right lateral flexion:  WFL    Joint Play   Joints within functional limits: L2, L3, L4 and L5   Mechanical Assessment    Cervical      Thoracic      Lumbar    Standing flexion: repeated movements   Pain location:no change  Standing extension: repeated movements  Pain location: no change  Pain intensity: worse  Pain level: increased    Strength/Myotome Testing     Left Hip   Planes of Motion   Flexion: 4+  Extension: 4  Abduction: 4-  Adduction: 4+  External rotation: 5  Internal rotation: 5    Right Hip   Planes of Motion   Flexion: 5  Extension: 4  Adduction: 5  External rotation: 5  Internal rotation: 5    Left Knee   Flexion: 5  Extension:  "5    Right Knee   Flexion: 5  Extension: 5    Left Ankle/Foot   Dorsiflexion: 5  Plantar flexion: 5    Right Ankle/Foot   Dorsiflexion: 5  Plantar flexion: 5    Tests     Lumbar   Negative SIJ compression and sacroiliac distraction.     Left   Negative crossed SLR, passive SLR, sural bias, tibial bias and slump test.     Right   Negative crossed SLR, passive SLR, sural bias, tibial bias and slump test.     Left Pelvic Girdle/Sacrum   Negative: active SLR test.     Right Pelvic Girdle/Sacrum   Negative: active SLR test.     Left Hip   Positive PATRICIA and FADIR.     Right Hip   Negative PATRICIA and FADIR.     Left Knee   Negative peroneal nerve tension.     Right Knee   Negative peroneal nerve tension.                Precautions:  Problem List[1]    Patient's Goals:    Date 6/18            Visits 1 2 3 4 5 6 7 8 9 10   Visits approved              FOTO             Eval/Re-eval                          Education             HEP/POC AYDEN            Manuals                                                    Ther Ex             PPT 5\"x10            TA+BKFO 5\"x10            TA+SLR 5\"x10            Piriformis stretch 30\"X3                         JAYCEE with strap x10                                                                Neuro Re-ed             Ther Activity                                       Gait Training                                       Modalities                                                     [1]   Patient Active Problem List  Diagnosis    Screening for metabolic disorder    Eating disorder    Adjustment reaction with anxiety and depression    Sprain of tibiofibular ligament of left ankle    Vitamin D deficiency    Hyperhidrosis    Muscle tension pain    Annual physical exam    Tinea corporis    Umbilical abnormality    Enlarged thyroid    Acute left-sided low back pain with left-sided sciatica     "

## 2025-07-16 ENCOUNTER — OFFICE VISIT (OUTPATIENT)
Dept: URGENT CARE | Facility: MEDICAL CENTER | Age: 30
End: 2025-07-16
Payer: COMMERCIAL

## 2025-07-16 VITALS
DIASTOLIC BLOOD PRESSURE: 69 MMHG | HEART RATE: 71 BPM | RESPIRATION RATE: 18 BRPM | OXYGEN SATURATION: 98 % | TEMPERATURE: 98.8 F | SYSTOLIC BLOOD PRESSURE: 117 MMHG

## 2025-07-16 DIAGNOSIS — L03.316 CELLULITIS OF UMBILICUS: Primary | ICD-10-CM

## 2025-07-16 PROCEDURE — 99213 OFFICE O/P EST LOW 20 MIN: CPT | Performed by: PHYSICIAN ASSISTANT

## 2025-07-16 RX ORDER — CLOTRIMAZOLE 1 %
CREAM (GRAM) TOPICAL 2 TIMES DAILY
Qty: 42 G | Refills: 0 | Status: SHIPPED | OUTPATIENT
Start: 2025-07-16

## 2025-07-16 RX ORDER — CEPHALEXIN 500 MG/1
500 CAPSULE ORAL EVERY 8 HOURS SCHEDULED
Qty: 30 CAPSULE | Refills: 0 | Status: SHIPPED | OUTPATIENT
Start: 2025-07-16 | End: 2025-07-26

## 2025-07-16 NOTE — PROGRESS NOTES
[facility]        NAME: Mercedes Lynne is a 30 y.o. female  : 1995    MRN: [id number]  DATE: 2025  TIME: 2:27 PM    /69   Pulse 71   Temp 98.8 °F (37.1 °C)   Resp 18   SpO2 98%     Assessment and Plan   Cellulitis of umbilicus [L03.316]  1. Cellulitis of umbilicus  clotrimazole (LOTRIMIN) 1 % cream    cephalexin (KEFLEX) 500 mg capsule            Patient Instructions       Follow up with PCP in 3-5 days.  Proceed to  ER if symptoms worsen.    Chief Complaint     Chief Complaint   Patient presents with    Skin Problem     Patient c/o redness with swelling ion her belly button x 2-3 days          History of Present Illness       Pt with umbilical redness and slight d/c for 2-3 days         Review of Systems   Review of Systems   Constitutional: Negative.    Eyes: Negative.    Respiratory: Negative.     Cardiovascular: Negative.    Gastrointestinal: Negative.    Endocrine: Negative.    Genitourinary: Negative.    Musculoskeletal: Negative.    Skin:  Positive for rash and wound.   Neurological: Negative.    Hematological: Negative.    Psychiatric/Behavioral: Negative.     All other systems reviewed and are negative.        Current Medications     Current Medications[1]    Current Allergies     Allergies as of 2025    (No Known Allergies)            The following portions of the patient's history were reviewed and updated as appropriate: allergies, current medications, past family history, past medical history, past social history, past surgical history and problem list.     Past Medical History[2]    Past Surgical History[3]    Family History[4]      Medications have been verified.        Objective   /69   Pulse 71   Temp 98.8 °F (37.1 °C)   Resp 18   SpO2 98%        Physical Exam     Physical Exam  Vitals and nursing note reviewed.   Constitutional:       Appearance: Normal appearance. She is normal weight.   HENT:      Head: Normocephalic and atraumatic.       Right Ear: Tympanic membrane, ear canal and external ear normal.      Left Ear: Tympanic membrane, ear canal and external ear normal.      Nose: Nose normal.      Mouth/Throat:      Mouth: Mucous membranes are moist.      Pharynx: Oropharynx is clear.     Eyes:      Extraocular Movements: Extraocular movements intact.      Pupils: Pupils are equal, round, and reactive to light.       Cardiovascular:      Rate and Rhythm: Normal rate and regular rhythm.      Pulses: Normal pulses.      Heart sounds: Normal heart sounds.   Pulmonary:      Effort: Pulmonary effort is normal.      Breath sounds: Normal breath sounds.   Abdominal:      Palpations: Abdomen is soft.     Musculoskeletal:         General: Normal range of motion.      Cervical back: Normal range of motion and neck supple.     Skin:     General: Skin is warm.      Capillary Refill: Capillary refill takes less than 2 seconds.      Comments: Umbilical slight erythema and tenderness no swelling       Neurological:      Mental Status: She is alert and oriented to person, place, and time.     Psychiatric:         Mood and Affect: Mood normal.                          [1]   Current Outpatient Medications:     cephalexin (KEFLEX) 500 mg capsule, Take 1 capsule (500 mg total) by mouth every 8 (eight) hours for 10 days, Disp: 30 capsule, Rfl: 0    clotrimazole (LOTRIMIN) 1 % cream, Apply topically 2 (two) times a day, Disp: 42 g, Rfl: 0    aluminum chloride (DRYSOL) 20 % external solution, Apply topically daily at bedtime (Patient not taking: Reported on 5/14/2025), Disp: 35 mL, Rfl: 0    ergocalciferol (ERGOCALCIFEROL) 1.25 MG (30560 UT) capsule, Take 1 capsule (50,000 Units total) by mouth once a week, Disp: 12 capsule, Rfl: 0    FLUoxetine (PROzac) 10 mg capsule, Take 3 capsules (30 mg total) by mouth daily, Disp: 270 capsule, Rfl: 1    hydrOXYzine HCL (ATARAX) 25 mg tablet, Take 1 tablet (25 mg total) by mouth every 6 (six) hours as needed for anxiety, Disp: 30  tablet, Rfl: 1    ibuprofen (MOTRIN) 600 mg tablet, Take 200 mg by mouth as needed for mild pain, Disp: , Rfl:     ketoconazole (NIZORAL) 2 % cream, Apply topically 2 (two) times a day, Disp: 15 g, Rfl: 1    meloxicam (Mobic) 15 mg tablet, Take 1 tablet (15 mg total) by mouth daily, Disp: 30 tablet, Rfl: 0  [2]   Past Medical History:  Diagnosis Date    Anxiety 2010    Started around 13 y/o    Chlamydia 04/2022    Ended a 3 year relationship & my ex notified me he was positive    Depression Approx 2010    History of sprained ankle     history of sprained left foot   [3]   Past Surgical History:  Procedure Laterality Date    HERNIA REPAIR  04/25/23    umbilical, unsure if mesh    WOUND DEBRIDEMENT N/A 04/25/2023    Procedure: EXCISIONAL DEBRIDEMENT UMBILICAL HERNIA;  Surgeon: Octavia Shetty MD;  Location: [facility];  Service: General   [4]   Family History  Problem Relation Name Age of Onset    Hypertension Mother Linda     Asthma Mother Linda     Suicide Attempts Father Chaim     Hypertension Father Chaim     Asthma Sister Charmaine     Heart disease Maternal Grandmother Medina     Asthma Maternal Grandmother Medina     Arthritis Maternal Grandmother Medina     Hypertension Maternal Grandfather Darnell     Diabetes Maternal Grandfather Darnell     No Known Problems Paternal Grandmother      No Known Problems Paternal Grandfather      Uterine cancer Maternal Aunt  55    Lung cancer Other Maternal Great aunt

## (undated) DEVICE — CHLORAPREP HI-LITE 26ML ORANGE

## (undated) DEVICE — DRAPE EQUIPMENT RF WAND

## (undated) DEVICE — INTENDED FOR TISSUE SEPARATION, AND OTHER PROCEDURES THAT REQUIRE A SHARP SURGICAL BLADE TO PUNCTURE OR CUT.: Brand: BARD-PARKER SAFETY BLADES SIZE 15, STERILE

## (undated) DEVICE — TUBING SUCTION 5MM X 12 FT

## (undated) DEVICE — DRESSING MEPILEX AG BORDER POST-OP 4 X 6 IN

## (undated) DEVICE — 2963 MEDIPORE SOFT CLOTH TAPE 3 IN X 10 YD 12 RLS/CS: Brand: 3M™ MEDIPORE™

## (undated) DEVICE — GLOVE SRG BIOGEL 6.5

## (undated) DEVICE — SCD SEQUENTIAL COMPRESSION COMFORT SLEEVE MEDIUM KNEE LENGTH: Brand: KENDALL SCD

## (undated) DEVICE — SUT MONOCRYL 4-0 PS-2 27 IN Y426H

## (undated) DEVICE — SUT VICRYL 3-0 SH 27 IN J416H

## (undated) DEVICE — 3M™ STERI-STRIP™ REINFORCED ADHESIVE SKIN CLOSURES, R1547, 1/2 IN X 4 IN (12 MM X 100 MM), 6 STRIPS/ENVELOPE: Brand: 3M™ STERI-STRIP™

## (undated) DEVICE — BETHLEHEM UNIVERSAL MINOR GEN: Brand: CARDINAL HEALTH

## (undated) DEVICE — NEEDLE HYPO 22G X 1-1/2 IN

## (undated) DEVICE — GAUZE SPONGES,16 PLY: Brand: CURITY

## (undated) DEVICE — SUT VICRYL 2-0 SH 27 IN UNDYED J417H

## (undated) DEVICE — ABDOMINAL PAD: Brand: DERMACEA

## (undated) DEVICE — PLUMEPEN PRO 10FT

## (undated) DEVICE — SUT PROLENE 0 CT-2 30 IN 8412H

## (undated) DEVICE — GLOVE INDICATOR PI UNDERGLOVE SZ 6.5 BLUE

## (undated) DEVICE — MEDI-VAC YANKAUER SUCTION HANDLE W/BULBOUS AND CONTROL VENT: Brand: CARDINAL HEALTH